# Patient Record
Sex: FEMALE | Race: WHITE | NOT HISPANIC OR LATINO | Employment: OTHER | URBAN - NONMETROPOLITAN AREA
[De-identification: names, ages, dates, MRNs, and addresses within clinical notes are randomized per-mention and may not be internally consistent; named-entity substitution may affect disease eponyms.]

---

## 2017-01-27 ENCOUNTER — TRANSCRIBE ORDERS (OUTPATIENT)
Dept: INTERNAL MEDICINE | Facility: CLINIC | Age: 57
End: 2017-01-27

## 2017-01-27 DIAGNOSIS — Z12.31 ENCOUNTER FOR SCREENING MAMMOGRAM FOR BREAST CANCER: Primary | ICD-10-CM

## 2017-02-01 ENCOUNTER — HOSPITAL ENCOUNTER (OUTPATIENT)
Dept: MAMMOGRAPHY | Facility: HOSPITAL | Age: 57
Discharge: HOME | End: 2017-02-01
Attending: FAMILY MEDICINE | Admitting: FAMILY MEDICINE

## 2017-02-01 DIAGNOSIS — Z12.31 ENCOUNTER FOR SCREENING MAMMOGRAM FOR BREAST CANCER: ICD-10-CM

## 2017-02-01 PROCEDURE — 77063 BREAST TOMOSYNTHESIS BI: CPT

## 2017-02-01 PROCEDURE — G0202 SCR MAMMO BI INCL CAD: HCPCS

## 2017-02-02 RX ORDER — HYDROCODONE BITARTRATE AND ACETAMINOPHEN 5; 325 MG/1; MG/1
1 TABLET ORAL 3 TIMES DAILY PRN
Qty: 90 TABLET | Refills: 0 | Status: SHIPPED | OUTPATIENT
Start: 2017-02-03 | End: 2017-03-01 | Stop reason: SDUPTHER

## 2017-02-03 ENCOUNTER — TRANSCRIBE ORDERS (OUTPATIENT)
Dept: ADMINISTRATIVE | Facility: HOSPITAL | Age: 57
End: 2017-02-03

## 2017-02-03 DIAGNOSIS — N63.10 BREAST MASS, RIGHT: Primary | ICD-10-CM

## 2017-02-06 ENCOUNTER — HOSPITAL ENCOUNTER (OUTPATIENT)
Dept: ULTRASOUND IMAGING | Facility: HOSPITAL | Age: 57
Discharge: HOME | End: 2017-02-06
Attending: FAMILY MEDICINE | Admitting: FAMILY MEDICINE

## 2017-02-06 DIAGNOSIS — N63.10 BREAST MASS, RIGHT: ICD-10-CM

## 2017-02-06 PROCEDURE — 76642 ULTRASOUND BREAST LIMITED: CPT

## 2017-03-02 RX ORDER — HYDROCODONE BITARTRATE AND ACETAMINOPHEN 5; 325 MG/1; MG/1
1 TABLET ORAL 3 TIMES DAILY PRN
Qty: 90 TABLET | Refills: 0 | Status: SHIPPED | OUTPATIENT
Start: 2017-03-05 | End: 2017-04-06 | Stop reason: SDUPTHER

## 2017-04-03 ENCOUNTER — LAB (OUTPATIENT)
Dept: LAB | Facility: HOSPITAL | Age: 57
End: 2017-04-03
Attending: FAMILY MEDICINE

## 2017-04-03 ENCOUNTER — TRANSCRIBE ORDERS (OUTPATIENT)
Dept: GENERAL RADIOLOGY | Facility: HOSPITAL | Age: 57
End: 2017-04-03

## 2017-04-03 DIAGNOSIS — E03.9 HYPOTHYROIDISM, UNSPECIFIED TYPE: Primary | ICD-10-CM

## 2017-04-03 DIAGNOSIS — E03.9 HYPOTHYROIDISM, UNSPECIFIED TYPE: ICD-10-CM

## 2017-04-03 LAB
ALBUMIN SERPL-MCNC: 4.1 G/DL (ref 3.5–5)
ALBUMIN/GLOB SERPL: 1.1 G/DL (ref 1.1–2.5)
ALP SERPL-CCNC: 71 U/L (ref 24–120)
ALT SERPL W P-5'-P-CCNC: 24 U/L (ref 0–54)
ANION GAP SERPL CALCULATED.3IONS-SCNC: 10 MMOL/L (ref 4–13)
AST SERPL-CCNC: 29 U/L (ref 7–45)
AUTO MIXED CELLS #: 0.3 10*3/UL (ref 0.1–2.6)
AUTO MIXED CELLS %: 5.9 % (ref 0.1–24)
BILIRUB SERPL-MCNC: 0.6 MG/DL (ref 0.1–1)
BUN BLD-MCNC: 20 MG/DL (ref 5–21)
BUN/CREAT SERPL: 23.3
CALCIUM SPEC-SCNC: 9.6 MG/DL (ref 8.4–10.4)
CHLORIDE SERPL-SCNC: 102 MMOL/L (ref 98–110)
CHOLEST SERPL-MCNC: 231 MG/DL (ref 130–200)
CO2 SERPL-SCNC: 28 MMOL/L (ref 24–31)
CREAT BLD-MCNC: 0.86 MG/DL (ref 0.5–1.4)
ERYTHROCYTE [DISTWIDTH] IN BLOOD BY AUTOMATED COUNT: 13.1 % (ref 12–15)
GFR SERPL CREATININE-BSD FRML MDRD: 69 ML/MIN/1.73
GLOBULIN UR ELPH-MCNC: 3.6 GM/DL
GLUCOSE BLD-MCNC: 86 MG/DL (ref 70–100)
HCT VFR BLD AUTO: 39.1 % (ref 37–47)
HDLC SERPL-MCNC: 77 MG/DL
HGB BLD-MCNC: 13.3 G/DL (ref 12–16)
LDLC SERPL CALC-MCNC: 113 MG/DL (ref 0–99)
LDLC/HDLC SERPL: 1.47 {RATIO}
LYMPHOCYTES # BLD AUTO: 0.8 10*3/MM3 (ref 0.8–7)
LYMPHOCYTES NFR BLD AUTO: 18.3 % (ref 15–45)
MCH RBC QN AUTO: 32.7 PG (ref 28–32)
MCHC RBC AUTO-ENTMCNC: 34 G/DL (ref 33–36)
MCV RBC AUTO: 96.1 FL (ref 82–98)
NEUTROPHILS # BLD AUTO: 3.5 10*3/MM3 (ref 1.5–8.3)
NEUTROPHILS NFR BLD AUTO: 75.8 % (ref 39–78)
PLATELET # BLD AUTO: 299 10*3/MM3 (ref 130–400)
PMV BLD AUTO: 8.7 FL (ref 6–12)
POTASSIUM BLD-SCNC: 4.8 MMOL/L (ref 3.5–5.3)
PROT SERPL-MCNC: 7.7 G/DL (ref 6.3–8.7)
RBC # BLD AUTO: 4.07 10*6/MM3 (ref 4.2–5.4)
SODIUM BLD-SCNC: 140 MMOL/L (ref 135–145)
TRIGL SERPL-MCNC: 204 MG/DL (ref 0–149)
TSH SERPL DL<=0.05 MIU/L-ACNC: 2.23 MIU/ML (ref 0.47–4.68)
VLDLC SERPL-MCNC: 40.8 MG/DL
WBC NRBC COR # BLD: 4.6 10*3/MM3 (ref 4.8–10.8)

## 2017-04-03 PROCEDURE — 85025 COMPLETE CBC W/AUTO DIFF WBC: CPT

## 2017-04-03 PROCEDURE — 36415 COLL VENOUS BLD VENIPUNCTURE: CPT

## 2017-04-03 PROCEDURE — 80061 LIPID PANEL: CPT

## 2017-04-03 PROCEDURE — 80053 COMPREHEN METABOLIC PANEL: CPT

## 2017-04-03 PROCEDURE — 84443 ASSAY THYROID STIM HORMONE: CPT | Performed by: FAMILY MEDICINE

## 2017-04-06 ENCOUNTER — HOSPITAL ENCOUNTER (OUTPATIENT)
Dept: PAIN MANAGEMENT | Age: 57
Discharge: HOME | End: 2017-04-06
Payer: COMMERCIAL

## 2017-04-06 VITALS
BODY MASS INDEX: 28.88 KG/M2 | RESPIRATION RATE: 20 BRPM | WEIGHT: 163 LBS | HEART RATE: 82 BPM | DIASTOLIC BLOOD PRESSURE: 72 MMHG | SYSTOLIC BLOOD PRESSURE: 159 MMHG | OXYGEN SATURATION: 95 % | TEMPERATURE: 97.8 F | HEIGHT: 63 IN

## 2017-04-06 DIAGNOSIS — G35 MULTIPLE SCLEROSIS (HCC): ICD-10-CM

## 2017-04-06 PROCEDURE — 99213 OFFICE O/P EST LOW 20 MIN: CPT

## 2017-04-06 RX ORDER — HYDROCODONE BITARTRATE AND ACETAMINOPHEN 5; 325 MG/1; MG/1
1 TABLET ORAL 3 TIMES DAILY PRN
Qty: 90 TABLET | Refills: 0 | Status: SHIPPED | OUTPATIENT
Start: 2017-04-06 | End: 2017-05-05 | Stop reason: SDUPTHER

## 2017-04-06 ASSESSMENT — PAIN DESCRIPTION - FREQUENCY: FREQUENCY: CONTINUOUS

## 2017-04-06 ASSESSMENT — PAIN DESCRIPTION - PAIN TYPE: TYPE: CHRONIC PAIN

## 2017-04-06 ASSESSMENT — PAIN SCALES - GENERAL: PAINLEVEL_OUTOF10: 3

## 2017-04-06 ASSESSMENT — PAIN DESCRIPTION - ORIENTATION: ORIENTATION: LOWER;RIGHT;LEFT

## 2017-04-06 ASSESSMENT — PAIN DESCRIPTION - ONSET: ONSET: ON-GOING

## 2017-04-06 ASSESSMENT — PAIN DESCRIPTION - LOCATION: LOCATION: BACK;LEG

## 2017-04-06 ASSESSMENT — PAIN DESCRIPTION - PROGRESSION: CLINICAL_PROGRESSION: NOT CHANGED

## 2017-04-06 ASSESSMENT — ACTIVITIES OF DAILY LIVING (ADL): EFFECT OF PAIN ON DAILY ACTIVITIES: DAILY ACTIVITY

## 2017-04-06 ASSESSMENT — PAIN DESCRIPTION - DESCRIPTORS: DESCRIPTORS: ACHING;CONSTANT

## 2017-05-05 RX ORDER — HYDROCODONE BITARTRATE AND ACETAMINOPHEN 5; 325 MG/1; MG/1
1 TABLET ORAL 3 TIMES DAILY PRN
Qty: 90 TABLET | Refills: 0 | Status: SHIPPED | OUTPATIENT
Start: 2017-05-06 | End: 2017-06-05 | Stop reason: SDUPTHER

## 2017-06-05 RX ORDER — HYDROCODONE BITARTRATE AND ACETAMINOPHEN 5; 325 MG/1; MG/1
1 TABLET ORAL 3 TIMES DAILY PRN
Qty: 90 TABLET | Refills: 0 | Status: SHIPPED | OUTPATIENT
Start: 2017-06-05 | End: 2017-07-12 | Stop reason: SDUPTHER

## 2017-07-07 ENCOUNTER — TELEPHONE (OUTPATIENT)
Dept: PAIN MANAGEMENT | Age: 57
End: 2017-07-07

## 2017-07-12 ENCOUNTER — HOSPITAL ENCOUNTER (OUTPATIENT)
Dept: PAIN MANAGEMENT | Age: 57
Discharge: HOME | End: 2017-07-12
Payer: COMMERCIAL

## 2017-07-12 VITALS
RESPIRATION RATE: 18 BRPM | HEIGHT: 64 IN | WEIGHT: 154 LBS | HEART RATE: 72 BPM | TEMPERATURE: 98.9 F | OXYGEN SATURATION: 99 % | SYSTOLIC BLOOD PRESSURE: 149 MMHG | DIASTOLIC BLOOD PRESSURE: 83 MMHG | BODY MASS INDEX: 26.29 KG/M2

## 2017-07-12 DIAGNOSIS — G35 MULTIPLE SCLEROSIS (HCC): ICD-10-CM

## 2017-07-12 PROCEDURE — 99213 OFFICE O/P EST LOW 20 MIN: CPT

## 2017-07-12 RX ORDER — HYDROCODONE BITARTRATE AND ACETAMINOPHEN 5; 325 MG/1; MG/1
1 TABLET ORAL 3 TIMES DAILY PRN
Qty: 90 TABLET | Refills: 0 | Status: SHIPPED | OUTPATIENT
Start: 2017-07-12 | End: 2017-08-10 | Stop reason: SDUPTHER

## 2017-07-12 ASSESSMENT — PAIN DESCRIPTION - ONSET: ONSET: ON-GOING

## 2017-07-12 ASSESSMENT — PAIN DESCRIPTION - LOCATION: LOCATION: BACK

## 2017-07-12 ASSESSMENT — PAIN DESCRIPTION - FREQUENCY: FREQUENCY: CONTINUOUS

## 2017-07-12 ASSESSMENT — ACTIVITIES OF DAILY LIVING (ADL): EFFECT OF PAIN ON DAILY ACTIVITIES: LIMITS ACTIVITY

## 2017-07-12 ASSESSMENT — PAIN DESCRIPTION - PAIN TYPE: TYPE: CHRONIC PAIN

## 2017-07-12 ASSESSMENT — PAIN DESCRIPTION - ORIENTATION: ORIENTATION: LOWER

## 2017-07-12 ASSESSMENT — PAIN SCALES - GENERAL: PAINLEVEL_OUTOF10: 6

## 2017-07-12 ASSESSMENT — PAIN DESCRIPTION - DIRECTION: RADIATING_TOWARDS: INTO BILATERAL LEGS

## 2017-07-12 ASSESSMENT — PAIN DESCRIPTION - DESCRIPTORS: DESCRIPTORS: ACHING;CONSTANT

## 2017-07-12 ASSESSMENT — PAIN DESCRIPTION - PROGRESSION: CLINICAL_PROGRESSION: NOT CHANGED

## 2017-08-11 RX ORDER — HYDROCODONE BITARTRATE AND ACETAMINOPHEN 5; 325 MG/1; MG/1
1 TABLET ORAL 3 TIMES DAILY PRN
Qty: 90 TABLET | Refills: 0 | Status: SHIPPED | OUTPATIENT
Start: 2017-08-11 | End: 2017-09-11 | Stop reason: SDUPTHER

## 2017-09-11 RX ORDER — HYDROCODONE BITARTRATE AND ACETAMINOPHEN 5; 325 MG/1; MG/1
1 TABLET ORAL 3 TIMES DAILY PRN
Qty: 90 TABLET | Refills: 0 | Status: SHIPPED | OUTPATIENT
Start: 2017-09-12 | End: 2017-10-12 | Stop reason: SDUPTHER

## 2017-10-12 ENCOUNTER — HOSPITAL ENCOUNTER (OUTPATIENT)
Dept: PAIN MANAGEMENT | Age: 57
Discharge: HOME | End: 2017-10-12
Payer: COMMERCIAL

## 2017-10-12 VITALS
HEART RATE: 72 BPM | RESPIRATION RATE: 20 BRPM | SYSTOLIC BLOOD PRESSURE: 154 MMHG | WEIGHT: 156 LBS | TEMPERATURE: 97.1 F | DIASTOLIC BLOOD PRESSURE: 84 MMHG | BODY MASS INDEX: 26.63 KG/M2 | HEIGHT: 64 IN

## 2017-10-12 DIAGNOSIS — G35 MULTIPLE SCLEROSIS (HCC): ICD-10-CM

## 2017-10-12 PROCEDURE — 99214 OFFICE O/P EST MOD 30 MIN: CPT

## 2017-10-12 PROCEDURE — 80307 DRUG TEST PRSMV CHEM ANLYZR: CPT

## 2017-10-12 ASSESSMENT — PAIN DESCRIPTION - PROGRESSION: CLINICAL_PROGRESSION: NOT CHANGED

## 2017-10-12 ASSESSMENT — PAIN DESCRIPTION - PAIN TYPE: TYPE: CHRONIC PAIN

## 2017-10-12 ASSESSMENT — PAIN DESCRIPTION - ORIENTATION: ORIENTATION: LOWER

## 2017-10-12 ASSESSMENT — PAIN DESCRIPTION - DIRECTION: RADIATING_TOWARDS: LEGS

## 2017-10-12 ASSESSMENT — PAIN DESCRIPTION - FREQUENCY: FREQUENCY: CONTINUOUS

## 2017-10-12 ASSESSMENT — PAIN DESCRIPTION - ONSET: ONSET: ON-GOING

## 2017-10-12 ASSESSMENT — PAIN DESCRIPTION - DESCRIPTORS: DESCRIPTORS: ACHING;CONSTANT

## 2017-10-12 ASSESSMENT — ACTIVITIES OF DAILY LIVING (ADL): EFFECT OF PAIN ON DAILY ACTIVITIES: LIMITS ADL'S

## 2017-10-12 ASSESSMENT — PAIN DESCRIPTION - LOCATION: LOCATION: BACK;LEG

## 2017-10-12 ASSESSMENT — PAIN SCALES - GENERAL: PAINLEVEL_OUTOF10: 3

## 2017-10-23 LAB
AMPHETAMINES, URINE: NEGATIVE NG/ML
BARBITURATES, URINE: NEGATIVE NG/ML
BENZODIAZEPINES, URINE: NEGATIVE NG/ML
CANNABINOIDS, URINE: NEGATIVE NG/ML
COCAINE METABOLITE, URINE: NEGATIVE NG/ML
CODEINE, URINE: NEGATIVE
CREATININE, URINE: 122.4 MG/DL (ref 20–300)
ETHANOL U, QUAN: NEGATIVE %
FENTANYL URINE: NEGATIVE PG/ML
HYDROCODONE, UR CONF: 829 NG/ML
HYDROCODONE, URINE: POSITIVE
HYDROMORPHONE, UR CONF: 745 NG/ML
HYDROMORPHONE, URINE: POSITIVE
MEPERIDINE, UR: NEGATIVE NG/ML
METHADONE SCREEN, URINE: NEGATIVE NG/ML
MORPHINE URINE: NEGATIVE
OPIATES, URINE: ABNORMAL NG/ML
OPIATES, URINE: POSITIVE NG/ML
OXYCODONE/OXYMORPHONE, UR: NEGATIVE NG/ML
PH, URINE: 5.4 (ref 4.5–8.9)
PHENCYCLIDINE, URINE: NEGATIVE NG/ML
PROPOXYPHENE, URINE: NEGATIVE NG/ML

## 2017-11-10 RX ORDER — HYDROCODONE BITARTRATE AND ACETAMINOPHEN 5; 325 MG/1; MG/1
1 TABLET ORAL 3 TIMES DAILY PRN
Qty: 90 TABLET | Refills: 0 | Status: SHIPPED | OUTPATIENT
Start: 2017-11-13 | End: 2017-12-12 | Stop reason: SDUPTHER

## 2017-12-13 RX ORDER — HYDROCODONE BITARTRATE AND ACETAMINOPHEN 5; 325 MG/1; MG/1
1 TABLET ORAL 3 TIMES DAILY PRN
Qty: 90 TABLET | Refills: 0 | Status: SHIPPED | OUTPATIENT
Start: 2017-12-13 | End: 2018-01-16 | Stop reason: SDUPTHER

## 2018-01-16 RX ORDER — HYDROCODONE BITARTRATE AND ACETAMINOPHEN 5; 325 MG/1; MG/1
1 TABLET ORAL 3 TIMES DAILY PRN
Qty: 90 TABLET | Refills: 0 | Status: SHIPPED | OUTPATIENT
Start: 2018-01-16 | End: 2018-02-16 | Stop reason: SDUPTHER

## 2018-01-29 ENCOUNTER — TRANSCRIBE ORDERS (OUTPATIENT)
Dept: ADMINISTRATIVE | Facility: HOSPITAL | Age: 58
End: 2018-01-29

## 2018-01-29 ENCOUNTER — DOCUMENTATION (OUTPATIENT)
Dept: RETAIL CLINIC | Facility: CLINIC | Age: 58
End: 2018-01-29

## 2018-01-29 ENCOUNTER — LAB (OUTPATIENT)
Dept: LAB | Facility: HOSPITAL | Age: 58
End: 2018-01-29

## 2018-01-29 ENCOUNTER — TELEPHONE (OUTPATIENT)
Dept: RETAIL CLINIC | Facility: CLINIC | Age: 58
End: 2018-01-29

## 2018-01-29 DIAGNOSIS — R53.83 OTHER FATIGUE: Primary | ICD-10-CM

## 2018-01-29 DIAGNOSIS — E55.9 VITAMIN D DEFICIENCY: ICD-10-CM

## 2018-01-29 DIAGNOSIS — R53.83 OTHER FATIGUE: ICD-10-CM

## 2018-01-29 LAB
ALBUMIN SERPL-MCNC: 4.7 G/DL (ref 3.5–5)
ALBUMIN/GLOB SERPL: 1.3 G/DL (ref 1.1–2.5)
ALP SERPL-CCNC: 82 U/L (ref 24–120)
ALT SERPL W P-5'-P-CCNC: 32 U/L (ref 0–54)
ANION GAP SERPL CALCULATED.3IONS-SCNC: 7 MMOL/L (ref 4–13)
AST SERPL-CCNC: 23 U/L (ref 7–45)
AUTO MIXED CELLS #: 0.8 10*3/MM3 (ref 0.1–2.6)
AUTO MIXED CELLS %: 14.1 % (ref 0.1–24)
BILIRUB SERPL-MCNC: 0.6 MG/DL (ref 0.1–1)
BILIRUB UR QL STRIP: NEGATIVE
BUN BLD-MCNC: 18 MG/DL (ref 5–21)
BUN/CREAT SERPL: 23.1
CALCIUM SPEC-SCNC: 9.9 MG/DL (ref 8.4–10.4)
CHLORIDE SERPL-SCNC: 98 MMOL/L (ref 98–110)
CLARITY UR: CLEAR
CO2 SERPL-SCNC: 33 MMOL/L (ref 24–31)
COLOR UR: YELLOW
CREAT BLD-MCNC: 0.78 MG/DL (ref 0.5–1.4)
ERYTHROCYTE [DISTWIDTH] IN BLOOD BY AUTOMATED COUNT: 12.2 % (ref 12–15)
ERYTHROCYTE [SEDIMENTATION RATE] IN BLOOD: 6 MM/HR (ref 0–20)
GFR SERPL CREATININE-BSD FRML MDRD: 77 ML/MIN/1.73
GLOBULIN UR ELPH-MCNC: 3.6 GM/DL
GLUCOSE BLD-MCNC: 90 MG/DL (ref 70–100)
GLUCOSE UR STRIP-MCNC: NEGATIVE MG/DL
HCT VFR BLD AUTO: 42 % (ref 37–47)
HGB BLD-MCNC: 14 G/DL (ref 12–16)
HGB UR QL STRIP.AUTO: NEGATIVE
KETONES UR QL STRIP: NEGATIVE
LEUKOCYTE ESTERASE UR QL STRIP.AUTO: NEGATIVE
LYMPHOCYTES # BLD AUTO: 1.2 10*3/MM3 (ref 0.8–7)
LYMPHOCYTES NFR BLD AUTO: 21.7 % (ref 15–45)
MCH RBC QN AUTO: 32 PG (ref 28–32)
MCHC RBC AUTO-ENTMCNC: 33.3 G/DL (ref 33–36)
MCV RBC AUTO: 95.9 FL (ref 82–98)
NEUTROPHILS # BLD AUTO: 3.7 10*3/MM3 (ref 1.5–8.3)
NEUTROPHILS NFR BLD AUTO: 64.2 % (ref 39–78)
NITRITE UR QL STRIP: NEGATIVE
PH UR STRIP.AUTO: 6 [PH] (ref 5–8)
PLATELET # BLD AUTO: 309 10*3/MM3 (ref 130–400)
PMV BLD AUTO: 8.5 FL (ref 6–12)
POTASSIUM BLD-SCNC: 4.4 MMOL/L (ref 3.5–5.3)
PROT SERPL-MCNC: 8.3 G/DL (ref 6.3–8.7)
PROT UR QL STRIP: NEGATIVE
RBC # BLD AUTO: 4.38 10*6/MM3 (ref 4.2–5.4)
SODIUM BLD-SCNC: 138 MMOL/L (ref 135–145)
SP GR UR STRIP: 1.02 (ref 1–1.03)
UROBILINOGEN UR QL STRIP: NORMAL
WBC NRBC COR # BLD: 5.7 10*3/MM3 (ref 4.8–10.8)

## 2018-01-29 PROCEDURE — 82306 VITAMIN D 25 HYDROXY: CPT | Performed by: NURSE PRACTITIONER

## 2018-01-29 PROCEDURE — 85652 RBC SED RATE AUTOMATED: CPT

## 2018-01-29 PROCEDURE — 81003 URINALYSIS AUTO W/O SCOPE: CPT

## 2018-01-29 PROCEDURE — 36415 COLL VENOUS BLD VENIPUNCTURE: CPT

## 2018-01-29 PROCEDURE — 85025 COMPLETE CBC W/AUTO DIFF WBC: CPT | Performed by: NURSE PRACTITIONER

## 2018-01-29 PROCEDURE — 80053 COMPREHEN METABOLIC PANEL: CPT | Performed by: NURSE PRACTITIONER

## 2018-01-29 NOTE — PROGRESS NOTES
Patient presented with complaints of dizziness which started yesterday. She also had an episode this morning when she felt like she couldn't get her words out.  She reports having a similar episode last month which lasted four days.  She has MS and is followed by Dr. Jade in [place]. She sees Dr. Madrigal, but he is not available today. I have recommended she go to a higher level of care. We discussed going to ER and my concern about her driving. I have offered to call an ambulance. She states she drove from [place] and feels safe to drive. She would like to avoid going to the ER and states she will go directly to [facility] which is very close by. She has given me permission to call and check on her later.

## 2018-01-29 NOTE — PROGRESS NOTES
I called to check on patient to make sure she arrived at [facility] okay. She states she went by there, but they were really busy so she went to her Daughter's house which is nearby instead.  She has had dizziness off and on since yesterday. She has some ringing of her ears yesterday which has resolved. She had some problems getting words out this morning, but that also resolved. She has had some problems with gait.  I have advised her to have her daughter drive her to ER now.  We have discussed that her symptoms could be MS related or something more serious which needs immediate attention. She is reluctant to go to ER stating she'll consider it.  I strongly encouraged her to have her daughter take her to ER now. She verbalized understanding.

## 2018-01-29 NOTE — TELEPHONE ENCOUNTER
I called and spoke with Mrs. Medley. She went to [facility] today and after evaluation was told she is likely having a MS flare. She now has an appointment to see her MS specialist Dr. Jade in [place] this Wednesday 1/24/2018.

## 2018-01-30 LAB — 25(OH)D3 SERPL-MCNC: 37.8 NG/ML (ref 30–100)

## 2018-02-06 ENCOUNTER — HOSPITAL ENCOUNTER (EMERGENCY)
Facility: HOSPITAL | Age: 58
Discharge: HOME | End: 2018-02-06
Attending: EMERGENCY MEDICINE | Admitting: EMERGENCY MEDICINE

## 2018-02-06 ENCOUNTER — APPOINTMENT (OUTPATIENT)
Dept: GENERAL RADIOLOGY | Facility: HOSPITAL | Age: 58
End: 2018-02-06

## 2018-02-06 VITALS
TEMPERATURE: 97.8 F | HEART RATE: 87 BPM | BODY MASS INDEX: 25.95 KG/M2 | SYSTOLIC BLOOD PRESSURE: 178 MMHG | WEIGHT: 152 LBS | DIASTOLIC BLOOD PRESSURE: 94 MMHG | OXYGEN SATURATION: 99 % | HEIGHT: 64 IN | RESPIRATION RATE: 19 BRPM

## 2018-02-06 DIAGNOSIS — R07.89 CHEST WALL PAIN: Primary | ICD-10-CM

## 2018-02-06 LAB
ALBUMIN SERPL-MCNC: 4.3 G/DL (ref 3.5–5)
ALBUMIN/GLOB SERPL: 1.5 G/DL (ref 1.1–2.5)
ALP SERPL-CCNC: 66 U/L (ref 24–120)
ALT SERPL W P-5'-P-CCNC: 26 U/L (ref 0–54)
ANION GAP SERPL CALCULATED.3IONS-SCNC: 10 MMOL/L (ref 4–13)
AST SERPL-CCNC: 30 U/L (ref 7–45)
BASOPHILS # BLD AUTO: 0.03 10*3/MM3 (ref 0–0.2)
BASOPHILS NFR BLD AUTO: 0.4 % (ref 0–2)
BILIRUB SERPL-MCNC: 0.5 MG/DL (ref 0.1–1)
BUN BLD-MCNC: 18 MG/DL (ref 5–21)
BUN/CREAT SERPL: 25.7 (ref 7–25)
CALCIUM SPEC-SCNC: 9.3 MG/DL (ref 8.4–10.4)
CHLORIDE SERPL-SCNC: 100 MMOL/L (ref 98–110)
CO2 SERPL-SCNC: 30 MMOL/L (ref 24–31)
CREAT BLD-MCNC: 0.7 MG/DL (ref 0.5–1.4)
DEPRECATED RDW RBC AUTO: 42.4 FL (ref 40–54)
EOSINOPHIL # BLD AUTO: 0.03 10*3/MM3 (ref 0–0.7)
EOSINOPHIL NFR BLD AUTO: 0.4 % (ref 0–4)
ERYTHROCYTE [DISTWIDTH] IN BLOOD BY AUTOMATED COUNT: 12.2 % (ref 12–15)
GFR SERPL CREATININE-BSD FRML MDRD: 87 ML/MIN/1.73
GLOBULIN UR ELPH-MCNC: 2.8 GM/DL
GLUCOSE BLD-MCNC: 140 MG/DL (ref 70–100)
HCT VFR BLD AUTO: 37.6 % (ref 37–47)
HGB BLD-MCNC: 12.7 G/DL (ref 12–16)
IMM GRANULOCYTES # BLD: 0.03 10*3/MM3 (ref 0–0.03)
IMM GRANULOCYTES NFR BLD: 0.4 % (ref 0–5)
LYMPHOCYTES # BLD AUTO: 0.64 10*3/MM3 (ref 0.72–4.86)
LYMPHOCYTES NFR BLD AUTO: 7.6 % (ref 15–45)
MCH RBC QN AUTO: 31.8 PG (ref 28–32)
MCHC RBC AUTO-ENTMCNC: 33.8 G/DL (ref 33–36)
MCV RBC AUTO: 94 FL (ref 82–98)
MONOCYTES # BLD AUTO: 0.33 10*3/MM3 (ref 0.19–1.3)
MONOCYTES NFR BLD AUTO: 3.9 % (ref 4–12)
NEUTROPHILS # BLD AUTO: 7.39 10*3/MM3 (ref 1.87–8.4)
NEUTROPHILS NFR BLD AUTO: 87.3 % (ref 39–78)
NRBC BLD MANUAL-RTO: 0 /100 WBC (ref 0–0)
PLATELET # BLD AUTO: 326 10*3/MM3 (ref 130–400)
PMV BLD AUTO: 9.3 FL (ref 6–12)
POTASSIUM BLD-SCNC: 4.5 MMOL/L (ref 3.5–5.3)
PROT SERPL-MCNC: 7.1 G/DL (ref 6.3–8.7)
RBC # BLD AUTO: 4 10*6/MM3 (ref 4.2–5.4)
SODIUM BLD-SCNC: 140 MMOL/L (ref 135–145)
TROPONIN I SERPL-MCNC: <0.012 NG/ML (ref 0–0.03)
WBC NRBC COR # BLD: 8.45 10*3/MM3 (ref 4.8–10.8)

## 2018-02-06 PROCEDURE — 96365 THER/PROPH/DIAG IV INF INIT: CPT

## 2018-02-06 PROCEDURE — 71045 X-RAY EXAM CHEST 1 VIEW: CPT

## 2018-02-06 PROCEDURE — 84484 ASSAY OF TROPONIN QUANT: CPT | Performed by: EMERGENCY MEDICINE

## 2018-02-06 PROCEDURE — 80053 COMPREHEN METABOLIC PANEL: CPT | Performed by: EMERGENCY MEDICINE

## 2018-02-06 PROCEDURE — 99283 EMERGENCY DEPT VISIT LOW MDM: CPT

## 2018-02-06 PROCEDURE — 85025 COMPLETE CBC W/AUTO DIFF WBC: CPT | Performed by: EMERGENCY MEDICINE

## 2018-02-06 PROCEDURE — 96375 TX/PRO/DX INJ NEW DRUG ADDON: CPT

## 2018-02-06 PROCEDURE — 93010 ELECTROCARDIOGRAM REPORT: CPT | Performed by: INTERNAL MEDICINE

## 2018-02-06 PROCEDURE — 25010000002 HYDROMORPHONE PER 4 MG: Performed by: EMERGENCY MEDICINE

## 2018-02-06 PROCEDURE — 25010000002 ONDANSETRON PER 1 MG: Performed by: EMERGENCY MEDICINE

## 2018-02-06 PROCEDURE — 25010000003 METHYLPREDNISOLONE PER 125 MG: Performed by: EMERGENCY MEDICINE

## 2018-02-06 PROCEDURE — 93005 ELECTROCARDIOGRAM TRACING: CPT | Performed by: EMERGENCY MEDICINE

## 2018-02-06 RX ORDER — SODIUM CHLORIDE 0.9 % (FLUSH) 0.9 %
10 SYRINGE (ML) INJECTION AS NEEDED
Status: DISCONTINUED | OUTPATIENT
Start: 2018-02-06 | End: 2018-02-06 | Stop reason: HOSPADM

## 2018-02-06 RX ORDER — ONDANSETRON 2 MG/ML
4 INJECTION INTRAMUSCULAR; INTRAVENOUS ONCE
Status: COMPLETED | OUTPATIENT
Start: 2018-02-06 | End: 2018-02-06

## 2018-02-06 RX ADMIN — METHYLPREDNISOLONE SODIUM SUCCINATE 1000 MG: 1 INJECTION, POWDER, LYOPHILIZED, FOR SOLUTION INTRAMUSCULAR; INTRAVENOUS at 14:59

## 2018-02-06 RX ADMIN — ONDANSETRON 4 MG: 2 INJECTION, SOLUTION INTRAMUSCULAR; INTRAVENOUS at 16:15

## 2018-02-06 RX ADMIN — HYDROMORPHONE HYDROCHLORIDE 1 MG: 1 INJECTION, SOLUTION INTRAMUSCULAR; INTRAVENOUS; SUBCUTANEOUS at 16:15

## 2018-02-08 NOTE — ED NOTES
"ED Call Back Questions    1. How are you doing since leaving the Emergency Department?    Not doing  better  2. Do you have any questions about your discharge instructions? No     3. Have you filled your new prescriptions yet? N/A  a. Do you have any questions about those medications? N/A    4. Were you able to make a follow-up appointment with the physician? No     5. Do you have a primary care physician? Yes   a. If No, would you like for me to set you up with one? N/A  i. If Yes, “I will have our ED  give you a call right back at this number to work with you on the best time for an appointment.”    6. We are always looking to get better at what we do. Do you have any suggestions for what we can do to be even better? N/A  a. If Yes, \"Thank you for sharing your concerns. I apologize. I will follow up with our manager and patient . Would you like someone to call you back?\" N/A    7. Is there anything else I can do for you? No   Visit was fine     Beltran King  02/08/18 9221    "

## 2018-02-12 ENCOUNTER — HOSPITAL ENCOUNTER (OUTPATIENT)
Facility: HOSPITAL | Age: 58
Setting detail: OBSERVATION
Discharge: HOME | End: 2018-02-14
Attending: EMERGENCY MEDICINE | Admitting: INTERNAL MEDICINE

## 2018-02-12 ENCOUNTER — APPOINTMENT (OUTPATIENT)
Dept: GENERAL RADIOLOGY | Facility: HOSPITAL | Age: 58
End: 2018-02-12

## 2018-02-12 ENCOUNTER — APPOINTMENT (OUTPATIENT)
Dept: CT IMAGING | Facility: HOSPITAL | Age: 58
End: 2018-02-12

## 2018-02-12 DIAGNOSIS — R07.9 CHEST PAIN, UNSPECIFIED TYPE: Primary | ICD-10-CM

## 2018-02-12 LAB
ALBUMIN SERPL-MCNC: 4.1 G/DL (ref 3.5–5)
ALBUMIN/GLOB SERPL: 1.5 G/DL (ref 1.1–2.5)
ALP SERPL-CCNC: 65 U/L (ref 24–120)
ALT SERPL W P-5'-P-CCNC: 40 U/L (ref 0–54)
AMYLASE SERPL-CCNC: 65 U/L (ref 30–110)
ANION GAP SERPL CALCULATED.3IONS-SCNC: 12 MMOL/L (ref 4–13)
AST SERPL-CCNC: 41 U/L (ref 7–45)
BASOPHILS # BLD AUTO: 0.01 10*3/MM3 (ref 0–0.2)
BASOPHILS NFR BLD AUTO: 0.2 % (ref 0–2)
BILIRUB SERPL-MCNC: 0.3 MG/DL (ref 0.1–1)
BUN BLD-MCNC: 16 MG/DL (ref 5–21)
BUN/CREAT SERPL: 19.5 (ref 7–25)
CALCIUM SPEC-SCNC: 9.4 MG/DL (ref 8.4–10.4)
CHLORIDE SERPL-SCNC: 98 MMOL/L (ref 98–110)
CO2 SERPL-SCNC: 30 MMOL/L (ref 24–31)
CREAT BLD-MCNC: 0.82 MG/DL (ref 0.5–1.4)
D DIMER PPP FEU-MCNC: 0.71 MG/L (FEU) (ref 0–0.5)
DEPRECATED RDW RBC AUTO: 43.7 FL (ref 40–54)
EOSINOPHIL # BLD AUTO: 0.35 10*3/MM3 (ref 0–0.7)
EOSINOPHIL NFR BLD AUTO: 5.5 % (ref 0–4)
ERYTHROCYTE [DISTWIDTH] IN BLOOD BY AUTOMATED COUNT: 12.5 % (ref 12–15)
GFR SERPL CREATININE-BSD FRML MDRD: 72 ML/MIN/1.73
GLOBULIN UR ELPH-MCNC: 2.7 GM/DL
GLUCOSE BLD-MCNC: 92 MG/DL (ref 70–100)
GLUCOSE BLDC GLUCOMTR-MCNC: 82 MG/DL (ref 70–130)
HCT VFR BLD AUTO: 38.5 % (ref 37–47)
HGB BLD-MCNC: 12.8 G/DL (ref 12–16)
HOLD SPECIMEN: NORMAL
HOLD SPECIMEN: NORMAL
IMM GRANULOCYTES # BLD: 0.03 10*3/MM3 (ref 0–0.03)
IMM GRANULOCYTES NFR BLD: 0.5 % (ref 0–5)
LIPASE SERPL-CCNC: 106 U/L (ref 23–203)
LYMPHOCYTES # BLD AUTO: 1.42 10*3/MM3 (ref 0.72–4.86)
LYMPHOCYTES NFR BLD AUTO: 22.3 % (ref 15–45)
MCH RBC QN AUTO: 31.3 PG (ref 28–32)
MCHC RBC AUTO-ENTMCNC: 33.2 G/DL (ref 33–36)
MCV RBC AUTO: 94.1 FL (ref 82–98)
MONOCYTES # BLD AUTO: 0.69 10*3/MM3 (ref 0.19–1.3)
MONOCYTES NFR BLD AUTO: 10.8 % (ref 4–12)
NEUTROPHILS # BLD AUTO: 3.88 10*3/MM3 (ref 1.87–8.4)
NEUTROPHILS NFR BLD AUTO: 60.7 % (ref 39–78)
NRBC BLD MANUAL-RTO: 0 /100 WBC (ref 0–0)
PLATELET # BLD AUTO: 325 10*3/MM3 (ref 130–400)
PMV BLD AUTO: 9.2 FL (ref 6–12)
POTASSIUM BLD-SCNC: 4.4 MMOL/L (ref 3.5–5.3)
PROT SERPL-MCNC: 6.8 G/DL (ref 6.3–8.7)
RBC # BLD AUTO: 4.09 10*6/MM3 (ref 4.2–5.4)
SODIUM BLD-SCNC: 140 MMOL/L (ref 135–145)
TROPONIN I SERPL-MCNC: <0.012 NG/ML (ref 0–0.03)
TROPONIN I SERPL-MCNC: <0.012 NG/ML (ref 0–0.03)
WBC NRBC COR # BLD: 6.38 10*3/MM3 (ref 4.8–10.8)
WHOLE BLOOD HOLD SPECIMEN: NORMAL
WHOLE BLOOD HOLD SPECIMEN: NORMAL

## 2018-02-12 PROCEDURE — G0378 HOSPITAL OBSERVATION PER HR: HCPCS

## 2018-02-12 PROCEDURE — 96376 TX/PRO/DX INJ SAME DRUG ADON: CPT

## 2018-02-12 PROCEDURE — 80053 COMPREHEN METABOLIC PANEL: CPT | Performed by: EMERGENCY MEDICINE

## 2018-02-12 PROCEDURE — 82150 ASSAY OF AMYLASE: CPT | Performed by: EMERGENCY MEDICINE

## 2018-02-12 PROCEDURE — 25010000002 HYDROMORPHONE PER 4 MG: Performed by: FAMILY MEDICINE

## 2018-02-12 PROCEDURE — 83690 ASSAY OF LIPASE: CPT | Performed by: EMERGENCY MEDICINE

## 2018-02-12 PROCEDURE — 85379 FIBRIN DEGRADATION QUANT: CPT | Performed by: EMERGENCY MEDICINE

## 2018-02-12 PROCEDURE — 0 IOPAMIDOL PER 1 ML: Performed by: EMERGENCY MEDICINE

## 2018-02-12 PROCEDURE — 25010000002 ENOXAPARIN PER 10 MG: Performed by: FAMILY MEDICINE

## 2018-02-12 PROCEDURE — 71275 CT ANGIOGRAPHY CHEST: CPT

## 2018-02-12 PROCEDURE — 93005 ELECTROCARDIOGRAM TRACING: CPT

## 2018-02-12 PROCEDURE — 99284 EMERGENCY DEPT VISIT MOD MDM: CPT

## 2018-02-12 PROCEDURE — 96374 THER/PROPH/DIAG INJ IV PUSH: CPT

## 2018-02-12 PROCEDURE — 25010000002 PROMETHAZINE PER 50 MG: Performed by: FAMILY MEDICINE

## 2018-02-12 PROCEDURE — 93010 ELECTROCARDIOGRAM REPORT: CPT | Performed by: INTERNAL MEDICINE

## 2018-02-12 PROCEDURE — 70450 CT HEAD/BRAIN W/O DYE: CPT

## 2018-02-12 PROCEDURE — 84484 ASSAY OF TROPONIN QUANT: CPT | Performed by: EMERGENCY MEDICINE

## 2018-02-12 PROCEDURE — 25010000002 HYDROMORPHONE PER 4 MG: Performed by: EMERGENCY MEDICINE

## 2018-02-12 PROCEDURE — 93005 ELECTROCARDIOGRAM TRACING: CPT | Performed by: EMERGENCY MEDICINE

## 2018-02-12 PROCEDURE — 85025 COMPLETE CBC W/AUTO DIFF WBC: CPT | Performed by: EMERGENCY MEDICINE

## 2018-02-12 PROCEDURE — 82962 GLUCOSE BLOOD TEST: CPT

## 2018-02-12 PROCEDURE — 25010000002 PROMETHAZINE PER 50 MG: Performed by: EMERGENCY MEDICINE

## 2018-02-12 PROCEDURE — 96375 TX/PRO/DX INJ NEW DRUG ADDON: CPT

## 2018-02-12 PROCEDURE — 96372 THER/PROPH/DIAG INJ SC/IM: CPT

## 2018-02-12 PROCEDURE — 71045 X-RAY EXAM CHEST 1 VIEW: CPT

## 2018-02-12 RX ORDER — SODIUM CHLORIDE 9 MG/ML
100 INJECTION, SOLUTION INTRAVENOUS CONTINUOUS
Status: DISCONTINUED | OUTPATIENT
Start: 2018-02-12 | End: 2018-02-14

## 2018-02-12 RX ORDER — DEXTROSE MONOHYDRATE 25 G/50ML
25 INJECTION, SOLUTION INTRAVENOUS
Status: DISCONTINUED | OUTPATIENT
Start: 2018-02-12 | End: 2018-02-14 | Stop reason: HOSPADM

## 2018-02-12 RX ORDER — NALOXONE HCL 0.4 MG/ML
0.4 VIAL (ML) INJECTION
Status: DISCONTINUED | OUTPATIENT
Start: 2018-02-12 | End: 2018-02-14 | Stop reason: HOSPADM

## 2018-02-12 RX ORDER — FINGOLIMOD HYDROCHLORIDE 0.5 MG/1
0.5 CAPSULE ORAL DAILY
COMMUNITY

## 2018-02-12 RX ORDER — ASPIRIN 325 MG
325 TABLET, DELAYED RELEASE (ENTERIC COATED) ORAL DAILY
Status: DISCONTINUED | OUTPATIENT
Start: 2018-02-13 | End: 2018-02-14 | Stop reason: HOSPADM

## 2018-02-12 RX ORDER — ACETAMINOPHEN 325 MG/1
325 TABLET ORAL EVERY 8 HOURS PRN
Status: DISCONTINUED | OUTPATIENT
Start: 2018-02-12 | End: 2018-02-14 | Stop reason: HOSPADM

## 2018-02-12 RX ORDER — GABAPENTIN 800 MG/1
900 TABLET ORAL 3 TIMES DAILY
COMMUNITY

## 2018-02-12 RX ORDER — SODIUM CHLORIDE 0.9 % (FLUSH) 0.9 %
10 SYRINGE (ML) INJECTION AS NEEDED
Status: DISCONTINUED | OUTPATIENT
Start: 2018-02-12 | End: 2018-02-14 | Stop reason: HOSPADM

## 2018-02-12 RX ORDER — ONDANSETRON 2 MG/ML
4 INJECTION INTRAMUSCULAR; INTRAVENOUS ONCE
Status: DISCONTINUED | OUTPATIENT
Start: 2018-02-12 | End: 2018-02-12

## 2018-02-12 RX ORDER — HYDROMORPHONE HCL 110MG/55ML
0.5 PATIENT CONTROLLED ANALGESIA SYRINGE INTRAVENOUS
Status: DISCONTINUED | OUTPATIENT
Start: 2018-02-12 | End: 2018-02-14

## 2018-02-12 RX ORDER — ATORVASTATIN CALCIUM 40 MG/1
40 TABLET, FILM COATED ORAL NIGHTLY
Status: DISCONTINUED | OUTPATIENT
Start: 2018-02-12 | End: 2018-02-14 | Stop reason: HOSPADM

## 2018-02-12 RX ORDER — TRAZODONE HYDROCHLORIDE 150 MG/1
300 TABLET ORAL NIGHTLY
COMMUNITY

## 2018-02-12 RX ORDER — ASPIRIN 81 MG/1
324 TABLET, CHEWABLE ORAL ONCE
Status: COMPLETED | OUTPATIENT
Start: 2018-02-12 | End: 2018-02-12

## 2018-02-12 RX ORDER — FAMOTIDINE 20 MG/1
40 TABLET, FILM COATED ORAL DAILY
Status: DISCONTINUED | OUTPATIENT
Start: 2018-02-13 | End: 2018-02-13

## 2018-02-12 RX ORDER — PROMETHAZINE HYDROCHLORIDE 25 MG/ML
12.5 INJECTION, SOLUTION INTRAMUSCULAR; INTRAVENOUS EVERY 6 HOURS PRN
Status: DISCONTINUED | OUTPATIENT
Start: 2018-02-12 | End: 2018-02-14

## 2018-02-12 RX ORDER — SODIUM CHLORIDE 0.9 % (FLUSH) 0.9 %
1-10 SYRINGE (ML) INJECTION AS NEEDED
Status: DISCONTINUED | OUTPATIENT
Start: 2018-02-12 | End: 2018-02-14 | Stop reason: HOSPADM

## 2018-02-12 RX ORDER — ACETAMINOPHEN 325 MG/1
300 TABLET ORAL 3 TIMES DAILY
COMMUNITY

## 2018-02-12 RX ORDER — PROMETHAZINE HYDROCHLORIDE 25 MG/ML
12.5 INJECTION, SOLUTION INTRAMUSCULAR; INTRAVENOUS ONCE
Status: COMPLETED | OUTPATIENT
Start: 2018-02-12 | End: 2018-02-12

## 2018-02-12 RX ORDER — PROMETHAZINE HYDROCHLORIDE 12.5 MG/1
12.5 SUPPOSITORY RECTAL EVERY 6 HOURS PRN
Status: DISCONTINUED | OUTPATIENT
Start: 2018-02-12 | End: 2018-02-14 | Stop reason: HOSPADM

## 2018-02-12 RX ORDER — ESTRADIOL 2 MG/1
2 TABLET ORAL DAILY
Status: DISCONTINUED | OUTPATIENT
Start: 2018-02-13 | End: 2018-02-14 | Stop reason: HOSPADM

## 2018-02-12 RX ORDER — TRAZODONE HYDROCHLORIDE 150 MG/1
300 TABLET ORAL NIGHTLY
Status: DISCONTINUED | OUTPATIENT
Start: 2018-02-12 | End: 2018-02-14 | Stop reason: HOSPADM

## 2018-02-12 RX ORDER — HYDROCODONE BITARTRATE AND ACETAMINOPHEN 5; 325 MG/1; MG/1
1 TABLET ORAL 3 TIMES DAILY
COMMUNITY

## 2018-02-12 RX ORDER — ESTRADIOL 2 MG/1
2 TABLET ORAL DAILY
COMMUNITY

## 2018-02-12 RX ORDER — NICOTINE POLACRILEX 4 MG
15 LOZENGE BUCCAL
Status: DISCONTINUED | OUTPATIENT
Start: 2018-02-12 | End: 2018-02-14 | Stop reason: HOSPADM

## 2018-02-12 RX ORDER — PROMETHAZINE HYDROCHLORIDE 25 MG/1
12.5 TABLET ORAL EVERY 6 HOURS PRN
Status: DISCONTINUED | OUTPATIENT
Start: 2018-02-12 | End: 2018-02-14 | Stop reason: HOSPADM

## 2018-02-12 RX ORDER — GABAPENTIN 400 MG/1
800 CAPSULE ORAL EVERY 8 HOURS SCHEDULED
Status: DISCONTINUED | OUTPATIENT
Start: 2018-02-12 | End: 2018-02-14 | Stop reason: HOSPADM

## 2018-02-12 RX ADMIN — IOPAMIDOL 100 ML: 755 INJECTION, SOLUTION INTRAVENOUS at 17:41

## 2018-02-12 RX ADMIN — HYDROMORPHONE HYDROCHLORIDE 0.5 MG: 2 INJECTION, SOLUTION INTRAMUSCULAR; INTRAVENOUS; SUBCUTANEOUS at 22:38

## 2018-02-12 RX ADMIN — DESMOPRESSIN ACETATE 40 MG: 0.2 TABLET ORAL at 22:38

## 2018-02-12 RX ADMIN — ASPIRIN 81 MG 324 MG: 81 TABLET ORAL at 18:56

## 2018-02-12 RX ADMIN — GABAPENTIN 800 MG: 400 CAPSULE ORAL at 22:38

## 2018-02-12 RX ADMIN — HYDROMORPHONE HYDROCHLORIDE 1 MG: 1 INJECTION, SOLUTION INTRAMUSCULAR; INTRAVENOUS; SUBCUTANEOUS at 18:49

## 2018-02-12 RX ADMIN — HYDROMORPHONE HYDROCHLORIDE 1 MG: 1 INJECTION, SOLUTION INTRAMUSCULAR; INTRAVENOUS; SUBCUTANEOUS at 17:10

## 2018-02-12 RX ADMIN — TRAZODONE HYDROCHLORIDE 300 MG: 150 TABLET ORAL at 22:38

## 2018-02-12 RX ADMIN — PROMETHAZINE HYDROCHLORIDE 12.5 MG: 25 INJECTION INTRAMUSCULAR; INTRAVENOUS at 18:49

## 2018-02-12 RX ADMIN — PROMETHAZINE HYDROCHLORIDE 12.5 MG: 25 INJECTION INTRAMUSCULAR; INTRAVENOUS at 17:18

## 2018-02-12 RX ADMIN — ENOXAPARIN SODIUM 40 MG: 40 INJECTION SUBCUTANEOUS at 22:39

## 2018-02-12 RX ADMIN — PROMETHAZINE HYDROCHLORIDE 12.5 MG: 25 INJECTION INTRAMUSCULAR; INTRAVENOUS at 22:38

## 2018-02-12 NOTE — ED PROVIDER NOTES
Subjective   Patient is a 57 y.o. female presenting with chest pain.   Chest Pain   Pain location:  Substernal area  Pain quality: aching and burning    Pain quality: not sharp and not tearing    Pain radiates to:  Does not radiate  Pain severity:  Moderate  Onset quality:  Gradual  Timing:  Constant  Progression:  Worsening  Chronicity:  New  Context: not breathing, not drug use, not eating, not lifting, not movement, not raising an arm and not at rest    Relieved by:  Nothing  Worsened by:  Nothing  Ineffective treatments:  None tried  Associated symptoms: no abdominal pain, no AICD problem, no altered mental status, no anorexia, no anxiety, no back pain, no diaphoresis, no dizziness, no dysphagia, no fatigue, no fever, no headache, no heartburn, no nausea, no numbness, no orthopnea, no palpitations, no PND, no shortness of breath, no vomiting and no weakness        Review of Systems   Constitutional: Negative.  Negative for activity change, appetite change, chills, diaphoresis, fatigue and fever.   HENT: Negative for congestion, drooling, ear pain, facial swelling, hearing loss, sinus pressure, sore throat and trouble swallowing.    Eyes: Negative.  Negative for discharge.   Respiratory: Negative for shortness of breath.    Cardiovascular: Positive for chest pain. Negative for palpitations, orthopnea and PND.   Gastrointestinal: Negative for abdominal distention, abdominal pain, anorexia, blood in stool, diarrhea, heartburn, nausea and vomiting.   Endocrine: Negative.  Negative for cold intolerance, heat intolerance, polydipsia, polyphagia and polyuria.   Genitourinary: Negative.  Negative for dysuria, flank pain and urgency.   Musculoskeletal: Negative.  Negative for arthralgias, back pain, myalgias and neck stiffness.   Skin: Negative.  Negative for color change, pallor and rash.   Allergic/Immunologic: Negative.    Neurological: Negative.  Negative for dizziness, seizures, speech difficulty, weakness, numbness  and headaches.   Hematological: Negative.  Negative for adenopathy.   All other systems reviewed and are negative.      Past Medical History:   Diagnosis Date   • MS (multiple sclerosis)        Allergies   Allergen Reactions   • Zofran [Ondansetron Hcl] Itching   • Ciprofloxacin Rash   • Penicillins Rash   • Sulfa Antibiotics Rash   • Thorazine [Chlorpromazine] Anxiety       Past Surgical History:   Procedure Laterality Date   • HYSTERECTOMY         Family History   Problem Relation Age of Onset   • Breast cancer Maternal Aunt        Social History     Social History   • Marital status:      Spouse name: N/A   • Number of children: N/A   • Years of education: N/A     Social History Main Topics   • Smoking status: Never Smoker   • Smokeless tobacco: None   • Alcohol use No   • Drug use: No   • Sexual activity: Defer     Other Topics Concern   • None     Social History Narrative           Objective   Physical Exam   Constitutional: She is oriented to person, place, and time. She appears well-developed and well-nourished.   HENT:   Head: Normocephalic.   Right Ear: External ear normal.   Eyes: Conjunctivae are normal. Pupils are equal, round, and reactive to light.   Neck: Normal range of motion. Neck supple.   Cardiovascular: Normal rate, regular rhythm, normal heart sounds and intact distal pulses.  PMI is not displaced.  Exam reveals no decreased pulses.    No murmur heard.  Pulmonary/Chest: Effort normal and breath sounds normal. No accessory muscle usage. No tachypnea. No respiratory distress. She has no decreased breath sounds. She has no wheezes. She has no rales. She exhibits no tenderness.   Abdominal: Soft. Bowel sounds are normal. There is no tenderness.   Musculoskeletal: Normal range of motion. She exhibits no edema or tenderness.   Lower extremity exam bilaterally is unremarkable.  There is no right or left calf tenderness .  There is no palpable venous cord.  No obvious difference in the size of  the legs.  No pitting edema.  The dorsalis pedis and posterior tibial femoral and popliteal pulses are palpable and +2 bilaterally.  Homans sign is negative       Vascular Status -  Her exam exhibits right foot vasculature normal. Her exam exhibits no right foot edema. Her exam exhibits left foot vasculature normal. Her exam exhibits no left foot edema.  Neurological: She is alert and oriented to person, place, and time. She has normal reflexes. No cranial nerve deficit. Coordination normal.   Skin: Skin is warm. No rash noted. No erythema.   Nursing note and vitals reviewed.      Procedures         ED Course  ED Course   Comment By Time   Ct scan report discussed Chandler Eng MD 02/12 1849   Patient is having pain in her chest.  Negative rule out and for pain meds Chandler Eng MD 02/12 1849                  [facility]    Final diagnoses:   Chest pain, unspecified type            Chandler Eng MD  02/12/18 0370

## 2018-02-13 ENCOUNTER — APPOINTMENT (OUTPATIENT)
Dept: CARDIOLOGY | Facility: HOSPITAL | Age: 58
End: 2018-02-13
Attending: FAMILY MEDICINE

## 2018-02-13 ENCOUNTER — APPOINTMENT (OUTPATIENT)
Dept: CARDIOLOGY | Facility: HOSPITAL | Age: 58
End: 2018-02-13

## 2018-02-13 ENCOUNTER — APPOINTMENT (OUTPATIENT)
Dept: GENERAL RADIOLOGY | Facility: HOSPITAL | Age: 58
End: 2018-02-13

## 2018-02-13 LAB
ALBUMIN SERPL-MCNC: 3.6 G/DL (ref 3.5–5)
ALBUMIN/GLOB SERPL: 1.3 G/DL (ref 1.1–2.5)
ALP SERPL-CCNC: 60 U/L (ref 24–120)
ALT SERPL W P-5'-P-CCNC: 42 U/L (ref 0–54)
ANION GAP SERPL CALCULATED.3IONS-SCNC: 9 MMOL/L (ref 4–13)
AST SERPL-CCNC: 50 U/L (ref 7–45)
BASOPHILS # BLD AUTO: 0.02 10*3/MM3 (ref 0–0.2)
BASOPHILS NFR BLD AUTO: 0.4 % (ref 0–2)
BH CV ECHO MEAS - AO MAX PG (FULL): 4.4 MMHG
BH CV ECHO MEAS - AO MAX PG: 8 MMHG
BH CV ECHO MEAS - AO MEAN PG (FULL): 4 MMHG
BH CV ECHO MEAS - AO MEAN PG: 6 MMHG
BH CV ECHO MEAS - AO ROOT AREA (BSA CORRECTED): 1.6
BH CV ECHO MEAS - AO ROOT AREA: 6.2 CM^2
BH CV ECHO MEAS - AO ROOT DIAM: 2.8 CM
BH CV ECHO MEAS - AO V2 MAX: 141 CM/SEC
BH CV ECHO MEAS - AO V2 MEAN: 114 CM/SEC
BH CV ECHO MEAS - AO V2 VTI: 37.3 CM
BH CV ECHO MEAS - AVA(I,A): 1.9 CM^2
BH CV ECHO MEAS - AVA(I,D): 1.9 CM^2
BH CV ECHO MEAS - AVA(V,A): 2.1 CM^2
BH CV ECHO MEAS - AVA(V,D): 2.1 CM^2
BH CV ECHO MEAS - BSA(HAYCOCK): 1.8 M^2
BH CV ECHO MEAS - BSA: 1.7 M^2
BH CV ECHO MEAS - BZI_BMI: 26.1 KILOGRAMS/M^2
BH CV ECHO MEAS - BZI_METRIC_HEIGHT: 162.6 CM
BH CV ECHO MEAS - BZI_METRIC_WEIGHT: 68.9 KG
BH CV ECHO MEAS - CONTRAST EF 4CH: 58.4 ML/M^2
BH CV ECHO MEAS - EDV(CUBED): 54 ML
BH CV ECHO MEAS - EDV(MOD-SP4): 58.9 ML
BH CV ECHO MEAS - EDV(TEICH): 61.2 ML
BH CV ECHO MEAS - EF(CUBED): 65.9 %
BH CV ECHO MEAS - EF(MOD-SP4): 58.4 %
BH CV ECHO MEAS - EF(TEICH): 58.2 %
BH CV ECHO MEAS - ESV(CUBED): 18.4 ML
BH CV ECHO MEAS - ESV(MOD-SP4): 24.5 ML
BH CV ECHO MEAS - ESV(TEICH): 25.6 ML
BH CV ECHO MEAS - FS: 30.2 %
BH CV ECHO MEAS - IVS/LVPW: 0.8
BH CV ECHO MEAS - IVSD: 0.98 CM
BH CV ECHO MEAS - LA DIMENSION: 3.1 CM
BH CV ECHO MEAS - LA/AO: 1.1
BH CV ECHO MEAS - LV DIASTOLIC VOL/BSA (35-75): 33.8 ML/M^2
BH CV ECHO MEAS - LV MASS(C)D: 134.4 GRAMS
BH CV ECHO MEAS - LV MASS(C)DI: 77.2 GRAMS/M^2
BH CV ECHO MEAS - LV MAX PG: 3.5 MMHG
BH CV ECHO MEAS - LV MEAN PG: 2 MMHG
BH CV ECHO MEAS - LV SYSTOLIC VOL/BSA (12-30): 14.1 ML/M^2
BH CV ECHO MEAS - LV V1 MAX: 93.8 CM/SEC
BH CV ECHO MEAS - LV V1 MEAN: 69 CM/SEC
BH CV ECHO MEAS - LV V1 VTI: 23 CM
BH CV ECHO MEAS - LVIDD: 3.8 CM
BH CV ECHO MEAS - LVIDS: 2.6 CM
BH CV ECHO MEAS - LVLD AP4: 6.4 CM
BH CV ECHO MEAS - LVLS AP4: 5.8 CM
BH CV ECHO MEAS - LVOT AREA (M): 3.1 CM^2
BH CV ECHO MEAS - LVOT AREA: 3.1 CM^2
BH CV ECHO MEAS - LVOT DIAM: 2 CM
BH CV ECHO MEAS - LVPWD: 1.2 CM
BH CV ECHO MEAS - MR MAX PG: 69.9 MMHG
BH CV ECHO MEAS - MR MAX VEL: 418 CM/SEC
BH CV ECHO MEAS - MV A MAX VEL: 80.5 CM/SEC
BH CV ECHO MEAS - MV DEC TIME: 0.3 SEC
BH CV ECHO MEAS - MV E MAX VEL: 66.5 CM/SEC
BH CV ECHO MEAS - MV E/A: 0.83
BH CV ECHO MEAS - RAP SYSTOLE: 10 MMHG
BH CV ECHO MEAS - RVSP: 39.2 MMHG
BH CV ECHO MEAS - SI(AO): 131.9 ML/M^2
BH CV ECHO MEAS - SI(CUBED): 20.5 ML/M^2
BH CV ECHO MEAS - SI(LVOT): 41.5 ML/M^2
BH CV ECHO MEAS - SI(MOD-SP4): 19.8 ML/M^2
BH CV ECHO MEAS - SI(TEICH): 20.5 ML/M^2
BH CV ECHO MEAS - SV(AO): 229.7 ML
BH CV ECHO MEAS - SV(CUBED): 35.6 ML
BH CV ECHO MEAS - SV(LVOT): 72.3 ML
BH CV ECHO MEAS - SV(MOD-SP4): 34.4 ML
BH CV ECHO MEAS - SV(TEICH): 35.6 ML
BH CV ECHO MEAS - TR MAX VEL: 270 CM/SEC
BH CV STRESS BP STAGE 1: NORMAL
BH CV STRESS BP STAGE 2: NORMAL
BH CV STRESS BP STAGE 3: NORMAL
BH CV STRESS DOB - ATROPINE STAGE 3: 0.3
BH CV STRESS DOSE DOBUTAMINE STAGE 1: 10
BH CV STRESS DOSE DOBUTAMINE STAGE 2: 20
BH CV STRESS DOSE DOBUTAMINE STAGE 3: 30
BH CV STRESS DURATION MIN STAGE 1: 3
BH CV STRESS DURATION MIN STAGE 2: 3
BH CV STRESS DURATION MIN STAGE 3: 2
BH CV STRESS DURATION SEC STAGE 1: 0
BH CV STRESS DURATION SEC STAGE 2: 0
BH CV STRESS DURATION SEC STAGE 3: 39
BH CV STRESS HR STAGE 1: 84
BH CV STRESS HR STAGE 2: 105
BH CV STRESS HR STAGE 3: 145
BH CV STRESS PROTOCOL 1: NORMAL
BH CV STRESS RECOVERY BP: NORMAL MMHG
BH CV STRESS RECOVERY HR: 103 BPM
BH CV STRESS STAGE 1: 1
BH CV STRESS STAGE 2: 2
BH CV STRESS STAGE 3: 3
BILIRUB SERPL-MCNC: 0.3 MG/DL (ref 0.1–1)
BUN BLD-MCNC: 14 MG/DL (ref 5–21)
BUN/CREAT SERPL: 17.1 (ref 7–25)
CALCIUM SPEC-SCNC: 9.1 MG/DL (ref 8.4–10.4)
CHLORIDE SERPL-SCNC: 97 MMOL/L (ref 98–110)
CO2 SERPL-SCNC: 32 MMOL/L (ref 24–31)
CREAT BLD-MCNC: 0.82 MG/DL (ref 0.5–1.4)
DEPRECATED RDW RBC AUTO: 43.2 FL (ref 40–54)
E/E' RATIO: 11.1
EOSINOPHIL # BLD AUTO: 0.27 10*3/MM3 (ref 0–0.7)
EOSINOPHIL NFR BLD AUTO: 5.7 % (ref 0–4)
ERYTHROCYTE [DISTWIDTH] IN BLOOD BY AUTOMATED COUNT: 12.7 % (ref 12–15)
GFR SERPL CREATININE-BSD FRML MDRD: 72 ML/MIN/1.73
GLOBULIN UR ELPH-MCNC: 2.7 GM/DL
GLUCOSE BLD-MCNC: 83 MG/DL (ref 70–100)
GLUCOSE BLDC GLUCOMTR-MCNC: 83 MG/DL (ref 70–130)
GLUCOSE BLDC GLUCOMTR-MCNC: 84 MG/DL (ref 70–130)
GLUCOSE BLDC GLUCOMTR-MCNC: 85 MG/DL (ref 70–130)
GLUCOSE BLDC GLUCOMTR-MCNC: 90 MG/DL (ref 70–130)
HCT VFR BLD AUTO: 35.1 % (ref 37–47)
HGB BLD-MCNC: 11.9 G/DL (ref 12–16)
IMM GRANULOCYTES # BLD: 0.03 10*3/MM3 (ref 0–0.03)
IMM GRANULOCYTES NFR BLD: 0.6 % (ref 0–5)
LEFT ATRIUM VOLUME INDEX: 19.3 ML/M2
LEFT ATRIUM VOLUME: 33.6 CM3
LYMPHOCYTES # BLD AUTO: 0.81 10*3/MM3 (ref 0.72–4.86)
LYMPHOCYTES NFR BLD AUTO: 17.2 % (ref 15–45)
MAGNESIUM SERPL-MCNC: 2 MG/DL (ref 1.4–2.2)
MAXIMAL PREDICTED HEART RATE: 165 BPM
MAXIMAL PREDICTED HEART RATE: 165 BPM
MCH RBC QN AUTO: 31.5 PG (ref 28–32)
MCHC RBC AUTO-ENTMCNC: 33.9 G/DL (ref 33–36)
MCV RBC AUTO: 92.9 FL (ref 82–98)
MONOCYTES # BLD AUTO: 0.72 10*3/MM3 (ref 0.19–1.3)
MONOCYTES NFR BLD AUTO: 15.3 % (ref 4–12)
NEUTROPHILS # BLD AUTO: 2.87 10*3/MM3 (ref 1.87–8.4)
NEUTROPHILS NFR BLD AUTO: 60.8 % (ref 39–78)
NRBC BLD MANUAL-RTO: 0 /100 WBC (ref 0–0)
PERCENT MAX PREDICTED HR: 87.88 %
PHOSPHATE SERPL-MCNC: 4.9 MG/DL (ref 2.5–4.5)
PLATELET # BLD AUTO: 280 10*3/MM3 (ref 130–400)
PMV BLD AUTO: 9.5 FL (ref 6–12)
POTASSIUM BLD-SCNC: 4.3 MMOL/L (ref 3.5–5.3)
PROT SERPL-MCNC: 6.3 G/DL (ref 6.3–8.7)
RBC # BLD AUTO: 3.78 10*6/MM3 (ref 4.2–5.4)
SODIUM BLD-SCNC: 138 MMOL/L (ref 135–145)
STRESS BASELINE BP: NORMAL MMHG
STRESS BASELINE HR: 68 BPM
STRESS PERCENT HR: 103 %
STRESS POST EXERCISE DUR MIN: 8 MIN
STRESS POST EXERCISE DUR SEC: 39 SEC
STRESS POST PEAK BP: NORMAL MMHG
STRESS POST PEAK HR: 145 BPM
STRESS TARGET HR: 140 BPM
STRESS TARGET HR: 140 BPM
TROPONIN I SERPL-MCNC: 0.04 NG/ML (ref 0–0.03)
TROPONIN I SERPL-MCNC: 0.1 NG/ML (ref 0–0.03)
TROPONIN I SERPL-MCNC: <0.012 NG/ML (ref 0–0.03)
WBC NRBC COR # BLD: 4.72 10*3/MM3 (ref 4.8–10.8)

## 2018-02-13 PROCEDURE — 85025 COMPLETE CBC W/AUTO DIFF WBC: CPT | Performed by: FAMILY MEDICINE

## 2018-02-13 PROCEDURE — 82962 GLUCOSE BLOOD TEST: CPT

## 2018-02-13 PROCEDURE — 83735 ASSAY OF MAGNESIUM: CPT | Performed by: FAMILY MEDICINE

## 2018-02-13 PROCEDURE — 93350 STRESS TTE ONLY: CPT | Performed by: INTERNAL MEDICINE

## 2018-02-13 PROCEDURE — 84484 ASSAY OF TROPONIN QUANT: CPT | Performed by: FAMILY MEDICINE

## 2018-02-13 PROCEDURE — 94799 UNLISTED PULMONARY SVC/PX: CPT

## 2018-02-13 PROCEDURE — 93352 ADMIN ECG CONTRAST AGENT: CPT | Performed by: INTERNAL MEDICINE

## 2018-02-13 PROCEDURE — 80053 COMPREHEN METABOLIC PANEL: CPT | Performed by: FAMILY MEDICINE

## 2018-02-13 PROCEDURE — 25010000002 PERFLUTREN 6.52 MG/ML SUSPENSION: Performed by: INTERNAL MEDICINE

## 2018-02-13 PROCEDURE — 25010000002 PROMETHAZINE PER 50 MG: Performed by: FAMILY MEDICINE

## 2018-02-13 PROCEDURE — 93306 TTE W/DOPPLER COMPLETE: CPT

## 2018-02-13 PROCEDURE — 94760 N-INVAS EAR/PLS OXIMETRY 1: CPT

## 2018-02-13 PROCEDURE — 25010000002 HYDROMORPHONE PER 4 MG: Performed by: FAMILY MEDICINE

## 2018-02-13 PROCEDURE — 84100 ASSAY OF PHOSPHORUS: CPT | Performed by: FAMILY MEDICINE

## 2018-02-13 PROCEDURE — 84484 ASSAY OF TROPONIN QUANT: CPT | Performed by: NURSE PRACTITIONER

## 2018-02-13 PROCEDURE — 93306 TTE W/DOPPLER COMPLETE: CPT | Performed by: INTERNAL MEDICINE

## 2018-02-13 PROCEDURE — G0378 HOSPITAL OBSERVATION PER HR: HCPCS

## 2018-02-13 PROCEDURE — 94640 AIRWAY INHALATION TREATMENT: CPT

## 2018-02-13 PROCEDURE — 25010000003 DOBUTAMINE PER 250 MG: Performed by: INTERNAL MEDICINE

## 2018-02-13 PROCEDURE — 25010000002 ENOXAPARIN PER 10 MG: Performed by: FAMILY MEDICINE

## 2018-02-13 PROCEDURE — 93350 STRESS TTE ONLY: CPT

## 2018-02-13 PROCEDURE — 96361 HYDRATE IV INFUSION ADD-ON: CPT

## 2018-02-13 PROCEDURE — 93017 CV STRESS TEST TRACING ONLY: CPT

## 2018-02-13 PROCEDURE — 96376 TX/PRO/DX INJ SAME DRUG ADON: CPT

## 2018-02-13 PROCEDURE — 74246 X-RAY XM UPR GI TRC 2CNTRST: CPT

## 2018-02-13 PROCEDURE — 96372 THER/PROPH/DIAG INJ SC/IM: CPT

## 2018-02-13 PROCEDURE — 93018 CV STRESS TEST I&R ONLY: CPT | Performed by: INTERNAL MEDICINE

## 2018-02-13 RX ORDER — ATROPINE SULFATE 1 MG/ML
0.3 INJECTION, SOLUTION INTRAMUSCULAR; INTRAVENOUS; SUBCUTANEOUS ONCE
Status: COMPLETED | OUTPATIENT
Start: 2018-02-13 | End: 2018-02-13

## 2018-02-13 RX ORDER — DOBUTAMINE HYDROCHLORIDE 100 MG/100ML
10-50 INJECTION INTRAVENOUS CONTINUOUS
Status: DISCONTINUED | OUTPATIENT
Start: 2018-02-13 | End: 2018-02-14 | Stop reason: HOSPADM

## 2018-02-13 RX ORDER — IPRATROPIUM BROMIDE AND ALBUTEROL SULFATE 2.5; .5 MG/3ML; MG/3ML
3 SOLUTION RESPIRATORY (INHALATION)
Status: DISCONTINUED | OUTPATIENT
Start: 2018-02-13 | End: 2018-02-14 | Stop reason: HOSPADM

## 2018-02-13 RX ORDER — IPRATROPIUM BROMIDE AND ALBUTEROL SULFATE 2.5; .5 MG/3ML; MG/3ML
3 SOLUTION RESPIRATORY (INHALATION) ONCE
Status: DISCONTINUED | OUTPATIENT
Start: 2018-02-13 | End: 2018-02-14 | Stop reason: HOSPADM

## 2018-02-13 RX ORDER — PANTOPRAZOLE SODIUM 40 MG/1
40 TABLET, DELAYED RELEASE ORAL
Status: DISCONTINUED | OUTPATIENT
Start: 2018-02-13 | End: 2018-02-14 | Stop reason: HOSPADM

## 2018-02-13 RX ADMIN — PERFLUTREN 8.48 MG: 6.52 INJECTION, SUSPENSION INTRAVENOUS at 09:34

## 2018-02-13 RX ADMIN — TRAZODONE HYDROCHLORIDE 300 MG: 150 TABLET ORAL at 20:08

## 2018-02-13 RX ADMIN — ASPIRIN 325 MG: 325 TABLET, COATED ORAL at 11:36

## 2018-02-13 RX ADMIN — GABAPENTIN 800 MG: 400 CAPSULE ORAL at 05:06

## 2018-02-13 RX ADMIN — Medication 10 MCG/KG/MIN: at 09:22

## 2018-02-13 RX ADMIN — FAMOTIDINE 40 MG: 20 TABLET, FILM COATED ORAL at 11:36

## 2018-02-13 RX ADMIN — ENOXAPARIN SODIUM 40 MG: 40 INJECTION SUBCUTANEOUS at 20:08

## 2018-02-13 RX ADMIN — HYDROMORPHONE HYDROCHLORIDE 0.5 MG: 2 INJECTION, SOLUTION INTRAMUSCULAR; INTRAVENOUS; SUBCUTANEOUS at 17:23

## 2018-02-13 RX ADMIN — HYDROMORPHONE HYDROCHLORIDE 0.5 MG: 2 INJECTION, SOLUTION INTRAMUSCULAR; INTRAVENOUS; SUBCUTANEOUS at 03:08

## 2018-02-13 RX ADMIN — PROMETHAZINE HYDROCHLORIDE 12.5 MG: 25 INJECTION INTRAMUSCULAR; INTRAVENOUS at 05:06

## 2018-02-13 RX ADMIN — SODIUM CHLORIDE 100 ML/HR: 9 INJECTION, SOLUTION INTRAVENOUS at 20:17

## 2018-02-13 RX ADMIN — HYDROMORPHONE HYDROCHLORIDE 0.5 MG: 2 INJECTION, SOLUTION INTRAMUSCULAR; INTRAVENOUS; SUBCUTANEOUS at 08:19

## 2018-02-13 RX ADMIN — DESMOPRESSIN ACETATE 40 MG: 0.2 TABLET ORAL at 20:08

## 2018-02-13 RX ADMIN — HYDROMORPHONE HYDROCHLORIDE 0.5 MG: 2 INJECTION, SOLUTION INTRAMUSCULAR; INTRAVENOUS; SUBCUTANEOUS at 11:36

## 2018-02-13 RX ADMIN — IPRATROPIUM BROMIDE AND ALBUTEROL SULFATE 3 ML: 2.5; .5 SOLUTION RESPIRATORY (INHALATION) at 11:26

## 2018-02-13 RX ADMIN — PROMETHAZINE HYDROCHLORIDE 12.5 MG: 25 INJECTION INTRAMUSCULAR; INTRAVENOUS at 17:23

## 2018-02-13 RX ADMIN — HYDROMORPHONE HYDROCHLORIDE 0.5 MG: 2 INJECTION, SOLUTION INTRAMUSCULAR; INTRAVENOUS; SUBCUTANEOUS at 15:28

## 2018-02-13 RX ADMIN — BARIUM SULFATE 120 ML: 980 POWDER, FOR SUSPENSION ORAL at 14:30

## 2018-02-13 RX ADMIN — HYDROMORPHONE HYDROCHLORIDE 0.5 MG: 2 INJECTION, SOLUTION INTRAMUSCULAR; INTRAVENOUS; SUBCUTANEOUS at 05:18

## 2018-02-13 RX ADMIN — IPRATROPIUM BROMIDE AND ALBUTEROL SULFATE 3 ML: 2.5; .5 SOLUTION RESPIRATORY (INHALATION) at 20:38

## 2018-02-13 RX ADMIN — BARIUM SULFATE 120 ML: 960 POWDER, FOR SUSPENSION ORAL at 14:30

## 2018-02-13 RX ADMIN — ATROPINE SULFATE 0.3 MG: 1 INJECTION, SOLUTION INTRAMUSCULAR; INTRAVENOUS; SUBCUTANEOUS at 09:34

## 2018-02-13 RX ADMIN — HYDROMORPHONE HYDROCHLORIDE 0.5 MG: 2 INJECTION, SOLUTION INTRAMUSCULAR; INTRAVENOUS; SUBCUTANEOUS at 20:08

## 2018-02-13 RX ADMIN — PROMETHAZINE HYDROCHLORIDE 12.5 MG: 25 INJECTION INTRAMUSCULAR; INTRAVENOUS at 11:35

## 2018-02-13 RX ADMIN — ESTRADIOL 2 MG: 2 TABLET ORAL at 11:36

## 2018-02-13 RX ADMIN — GABAPENTIN 800 MG: 400 CAPSULE ORAL at 20:08

## 2018-02-13 NOTE — PLAN OF CARE
Problem: Fall Risk (Adult)  Goal: Absence of Falls  Outcome: Unable to achieve outcome(s) by discharge Date Met: 02/13/18  Patient found in floor going from bathroom back to bed. JOSE DAVID SALDIVAR aware.    02/12/18 2100   Fall Risk (Adult)   Absence of Falls unable to achieve outcome

## 2018-02-13 NOTE — PLAN OF CARE
Problem: Patient Care Overview (Adult)  Goal: Plan of Care Review  Outcome: Ongoing (interventions implemented as appropriate)   02/13/18 1513   Coping/Psychosocial Response Interventions   Plan Of Care Reviewed With patient   Patient Care Overview   Progress no change   Outcome Evaluation   Outcome Summary/Follow up Plan remain swith c/o pain and nausea, medicated per order with adaquate relief. awaiting results of barium swallow, sts neg. tele reads NSR. will cont to monitor       Problem: Pain, Chronic (Adult)  Goal: Identify Related Risk Factors and Signs and Symptoms  Outcome: Ongoing (interventions implemented as appropriate)    Goal: Acceptable Pain Control/Comfort Level  Outcome: Ongoing (interventions implemented as appropriate)

## 2018-02-13 NOTE — H&P
[facility] Medicine Services  HISTORY AND PHYSICAL    Date of Admission: 2/12/2018  Primary Care Physician: Jose Ramon Agarwal MD    Subjective     Chief Complaint: Chest discomfort    History of Present Illness  57-year-old female with past medical history of multiple sclerosis being followed by neurologist as outpatient comes into the ER with left-sided chest pain.  Patient states it started 3 days ago.  Spent progressively getting worse.  Pain is 6 out of 10 intensity, pressure in quality and radiating toward left arm.  Her pain is exacerbated by exertion.  Nothing makes the pain better.  Patient was recently admitted to the hospital for similar complaints and cardiac workup was negative.  Patient's initial d-dimer was elevated but CTA was negative for PE.        Review of Systems     Otherwise complete ROS reviewed and negative except as mentioned in the HPI.    Past Medical History:   Past Medical History:   Diagnosis Date   • MS (multiple sclerosis)      Past Surgical History:  Past Surgical History:   Procedure Laterality Date   • APPENDECTOMY     • CHOLECYSTECTOMY     • HAND RECONSTRUCTION Right    • HYSTERECTOMY     • REPLACEMENT TOTAL KNEE Left      Social History:  reports that she has quit smoking. She has never used smokeless tobacco. She reports that she does not drink alcohol or use illicit drugs.    Family History: family history includes Breast cancer in her maternal aunt.      Allergies:  Allergies   Allergen Reactions   • Zofran [Ondansetron Hcl] Itching   • Ciprofloxacin Rash   • Penicillins Rash   • Sulfa Antibiotics Rash   • Thorazine [Chlorpromazine] Anxiety     Medications:  Prior to Admission medications    Medication Sig Start Date End Date Taking? Authorizing Provider   acetaminophen (TYLENOL) 325 MG tablet Take 300 mg by mouth Every 8 (Eight) Hours As Needed for Mild Pain .   Yes Historical Provider, MD   estradiol (ESTRACE) 2 MG tablet Take 2 mg by  "mouth Daily.   Yes Historical Provider, MD   fingolimod (GILENYA) 0.5 MG capsule Take 0.5 mg by mouth Daily.   Yes Historical Provider, MD   gabapentin (NEURONTIN) 800 MG tablet Take 900 mg by mouth 3 (Three) Times a Day.   Yes Historical Provider, MD   HYDROcodone-acetaminophen (NORCO) 5-325 MG per tablet Take 1 tablet by mouth Every 8 (Eight) Hours As Needed.   Yes Historical Provider, MD   traZODone (DESYREL) 150 MG tablet Take 300 mg by mouth Every Night.   Yes Historical Provider, MD     Objective     Vital Signs: /53 (BP Location: Left arm, Patient Position: Sitting)  Pulse 85  Temp 97.4 °F (36.3 °C) (Temporal Artery )   Resp 17  Ht 162.6 cm (64.02\")  Wt 70.8 kg (156 lb 2 oz)  SpO2 97%  BMI 26.79 kg/m2  Physical Exam   Constitutional: She is oriented to person, place, and time. She appears well-developed and well-nourished.   Mild distress due to chest discomfort   HENT:   Head: Normocephalic and atraumatic.   Eyes: EOM are normal. Pupils are equal, round, and reactive to light.   Neck: Normal range of motion. Neck supple.   Cardiovascular: Normal rate, regular rhythm and normal heart sounds.    Pulmonary/Chest: Effort normal and breath sounds normal.   Abdominal: Soft. Bowel sounds are normal.   Musculoskeletal: Normal range of motion.   Neurological: She is alert and oriented to person, place, and time.   Skin: Skin is warm.   Psychiatric: She has a normal mood and affect. Her behavior is normal. Judgment and thought content normal.             Results Reviewed:  Lab Results (last 24 hours)     Procedure Component Value Units Date/Time    CBC & Differential [[id number]] Collected:  02/12/18 1623    Specimen:  Blood Updated:  02/12/18 1633    Narrative:       The following orders were created for panel order CBC & Differential.  Procedure                               Abnormality         Status                     ---------                               -----------         ------                   "   CBC Auto Differential[[id number]]        Abnormal            Final result                 Please view results for these tests on the individual orders.    CBC Auto Differential [[id number]]  (Abnormal) Collected:  02/12/18 1623    Specimen:  Blood Updated:  02/12/18 1633     WBC 6.38 10*3/mm3      RBC 4.09 (L) 10*6/mm3      Hemoglobin 12.8 g/dL      Hematocrit 38.5 %      MCV 94.1 fL      MCH 31.3 pg      MCHC 33.2 g/dL      RDW 12.5 %      RDW-SD 43.7 fl      MPV 9.2 fL      Platelets 325 10*3/mm3      Neutrophil % 60.7 %      Lymphocyte % 22.3 %      Monocyte % 10.8 %      Eosinophil % 5.5 (H) %      Basophil % 0.2 %      Immature Grans % 0.5 %      Neutrophils, Absolute 3.88 10*3/mm3      Lymphocytes, Absolute 1.42 10*3/mm3      Monocytes, Absolute 0.69 10*3/mm3      Eosinophils, Absolute 0.35 10*3/mm3      Basophils, Absolute 0.01 10*3/mm3      Immature Grans, Absolute 0.03 10*3/mm3      nRBC 0.0 /100 WBC     Comprehensive Metabolic Panel [[id number]] Collected:  02/12/18 1623    Specimen:  Blood Updated:  02/12/18 1642     Glucose 92 mg/dL      BUN 16 mg/dL      Creatinine 0.82 mg/dL      Sodium 140 mmol/L      Potassium 4.4 mmol/L      Chloride 98 mmol/L      CO2 30.0 mmol/L      Calcium 9.4 mg/dL      Total Protein 6.8 g/dL      Albumin 4.10 g/dL      ALT (SGPT) 40 U/L      AST (SGOT) 41 U/L      Alkaline Phosphatase 65 U/L      Total Bilirubin 0.3 mg/dL      eGFR Non African Amer 72 mL/min/1.73      Globulin 2.7 gm/dL      A/G Ratio 1.5 g/dL      BUN/Creatinine Ratio 19.5     Anion Gap 12.0 mmol/L     Amylase [[id number]]  (Normal) Collected:  02/12/18 1623    Specimen:  Blood Updated:  02/12/18 1643     Amylase 65 U/L     Lipase [[id number]]  (Normal) Collected:  02/12/18 1623    Specimen:  Blood Updated:  02/12/18 1643     Lipase 106 U/L     D-dimer, Quantitative [[id number]]  (Abnormal) Collected:  02/12/18 1623    Specimen:  Blood Updated:  02/12/18 1650     D-Dimer, Quantitative 0.71 (H) mg/L (FEU)      Narrative:       Reference Range is 0-0.50 mg/L FEU. However, results <0.50 mg/L FEU tends to rule out DVT or PE. Results >0.50 mg/L FEU are not useful in predicting absence or presence of DVT or PE.    Troponin [[id number]]  (Normal) Collected:  02/12/18 1623    Specimen:  Blood Updated:  02/12/18 1654     Troponin I <0.012 ng/mL     [place] Draw [[id number]] Collected:  02/12/18 1623    Specimen:  Blood Updated:  02/12/18 1731    Narrative:       The following orders were created for panel order [place] Draw.  Procedure                               Abnormality         Status                     ---------                               -----------         ------                     Light Blue Top[[id number]]                                   Final result               Green Top (Gel)[[id number]]                                  Final result               Lavender Top[[id number]]                                     Final result               Red Top[[id number]]                                          Final result                 Please view results for these tests on the individual orders.    Light Blue Top [[id number]] Collected:  02/12/18 1623    Specimen:  Blood Updated:  02/12/18 1731     Extra Tube hold for add-on      Auto resulted       Green Top (Gel) [[id number]] Collected:  02/12/18 1623    Specimen:  Blood Updated:  02/12/18 1731     Extra Tube Hold for add-ons.      Auto resulted.       Lavender Top [[id number]] Collected:  02/12/18 1623    Specimen:  Blood Updated:  02/12/18 1731     Extra Tube hold for add-on      Auto resulted       Red Top [[id number]] Collected:  02/12/18 1623    Specimen:  Blood Updated:  02/12/18 1731     Extra Tube Hold for add-ons.      Auto resulted.       Troponin [[id number]]  (Normal) Collected:  02/12/18 1904    Specimen:  Blood Updated:  02/12/18 1933     Troponin I <0.012 ng/mL         Imaging Results (last 24 hours)     Procedure Component Value Units Date/Time    XR Chest 1 View  [[id number]] Collected:  02/12/18 1638     Updated:  02/12/18 1642    Narrative:       HISTORY: Chest pain     CXR: Frontal view the chest performed.     COMPARISON: 02/06/2018     FINDINGS: The lungs are free of consolidation, collapse, or edema. Few  calcified granuloma are suspected. The cardiac and mediastinal contours  are normal. There is no pleural effusion or pneumothorax. There is no  acute bony pathology.       Impression:       1. No acute cardiopulmonary process..  This report was finalized on 02/12/2018 16:39 by Dr. Gladys Gonzalez MD.    CT Angiogram Chest With Contrast [[id number]] Collected:  02/12/18 1815     Updated:  02/12/18 1821    Narrative:       CT ANGIOGRAM CHEST W CONTRAST- 2/12/2018 5:30 PM CST      HISTORY: Chest pain, acute, PE suspected, intermed prob, positive  D-dimer      COMPARISON: 11/12/2014     DOSE LENGTH PRODUCT: 446 mGy cm. Automated exposure control was also  utilized to decrease patient radiation dose.     TECHNIQUE: Helical tomographic images of the chest were obtained after  the administration of intravenous contrast following angiogram protocol.  Additionally, 3D and multiplanar reformatted images were provided.        FINDINGS:    Pulmonary arteries: There is adequate enhancement of the pulmonary  arteries to evaluate for central and segmental pulmonary emboli. There  are no filling defects within the main, lobar, segmental or visualized  subsegmental pulmonary arteries.    .      Aorta and great vessels: The aorta is well opacified and demonstrates no  dissection or aneurysm. The great vessels are normal in appearance.     Visualized neck base: The imaged portion of the base of the neck appears  unremarkable.      Lungs: The lungs are clear. There is no mass, worrisome nodule, or  consolidation.     Small bilateral pleural effusions are present.. The trachea and  bronchial tree are patent.      Heart: The heart is normal in size. There is no pericardial effusion.       Mediastinum and lymph nodes: No enlarged mediastinal, hilar, or axillary  lymph nodes are present.      Skeletal and soft tissues: The osseous structures of the thorax and  surrounding soft tissues demonstrate no acute process.     Upper abdomen: Nonobstructing renal calculi are present bilaterally..        Impression:       1. No evidence of embolic disease.  2. Small bilateral posterior pleural effusions  3. Bilateral nonobstructing renal calculi.        This report was finalized on 02/12/2018 18:18 by Dr. Derek Major MD.        I have personally reviewed and interpreted the radiology studies and ECG obtained at time of admission.     Assessment / Plan     Assessment:   Hospital Problem List     Chest pain        1.  Chest discomfort rule out MI  2.  Elevated d-dimer  3.  Hyperglycemia      Plan:      -Admit to telemetry  -Continue cardiac monitoring  -Continuous pulse ox  -Follow-up subsequent troponin level  -Follow-up echocardiogram  -Pain management  -Consider cardiology consult in the a.m. if indicated  -D-dimer elevated-CTA negative  -Strict glycemic control  -GI prophylaxis  -DVT prophylaxis      Code Status: Full code     I discussed the patients findings and my recommendations with the patient's RN    Estimated length of stay 1-2 days    Marianne Polanco MD   02/12/18   8:36 PM

## 2018-02-13 NOTE — PROGRESS NOTES
[facility] Medicine Services  INPATIENT PROGRESS NOTE    Length of Stay: 0  Date of Admission: 2/12/2018  Primary Care Physician: Jose Ramon Agarwal MD    Subjective   Chief Complaint: Follow up chest pain  HPI   Pt continues to complain of chest pain. States she has a heaviness all the time followed by sharp stinging pain that shoots across her chest. She has had significant nausea vomiting this am. Dobutamine stress echo was low risk for ischemia, however, she has continued to have chest pain and nausea/vomiting. We opted to perform upper GI. Pt was very lethargic after receiving phenergan but states her nausea is improving. Repeat troponin was very mildly elevated.    Review of Systems   All pertinent negatives and positives are as above. All other systems have been reviewed and are negative unless otherwise stated.     Objective    Temp:  [97.3 °F (36.3 °C)-98.8 °F (37.1 °C)] 98.8 °F (37.1 °C)  Heart Rate:  [65-85] 77  Resp:  [16-18] 18  BP: (101-153)/(47-83) 141/66  Physical Exam   Constitutional: She is oriented to person, place, and time. She appears well-developed and well-nourished.   HENT:   Head: Normocephalic and atraumatic.   Eyes: Conjunctivae and EOM are normal. Pupils are equal, round, and reactive to light.   Neck: Neck supple. No JVD present. No thyromegaly present.   Cardiovascular: Normal rate, regular rhythm, normal heart sounds and intact distal pulses.  Exam reveals no gallop and no friction rub.    No murmur heard.  Pulmonary/Chest: Effort normal and breath sounds normal. No respiratory distress. She has no wheezes. She has no rales. She exhibits no tenderness.   Abdominal: Soft. Bowel sounds are normal. She exhibits no distension. There is no tenderness. There is no rebound and no guarding.   Musculoskeletal: Normal range of motion. She exhibits no edema, tenderness or deformity.   Lymphadenopathy:     She has no cervical adenopathy.   Neurological: She is  alert and oriented to person, place, and time. She displays normal reflexes. No cranial nerve deficit. She exhibits normal muscle tone.   Skin: Skin is warm and dry. No rash noted.   Psychiatric: She has a normal mood and affect. Her behavior is normal. Judgment and thought content normal.     Results Review:  I have reviewed the labs, radiology results, and diagnostic studies.    Laboratory Data:     Results from last 7 days  Lab Units 02/13/18  0659 02/12/18  1623   WBC 10*3/mm3 4.72* 6.38   HEMOGLOBIN g/dL 11.9* 12.8   HEMATOCRIT % 35.1* 38.5   PLATELETS 10*3/mm3 280 325       Results from last 7 days  Lab Units 02/13/18  0659 02/12/18  1623   SODIUM mmol/L 138 140   POTASSIUM mmol/L 4.3 4.4   CHLORIDE mmol/L 97* 98   CO2 mmol/L 32.0* 30.0   BUN mg/dL 14 16   CREATININE mg/dL 0.82 0.82   CALCIUM mg/dL 9.1 9.4   BILIRUBIN mg/dL 0.3 0.3   ALK PHOS U/L 60 65   ALT (SGPT) U/L 42 40   AST (SGOT) U/L 50* 41   GLUCOSE mg/dL 83 92     Radiology Data:   Imaging Results (last 24 hours)     Procedure Component Value Units Date/Time    XR Chest 1 View [[id number]] Collected:  02/12/18 1638     Updated:  02/12/18 1642    Narrative:       HISTORY: Chest pain     CXR: Frontal view the chest performed.     COMPARISON: 02/06/2018     FINDINGS: The lungs are free of consolidation, collapse, or edema. Few  calcified granuloma are suspected. The cardiac and mediastinal contours  are normal. There is no pleural effusion or pneumothorax. There is no  acute bony pathology.       Impression:       1. No acute cardiopulmonary process..  This report was finalized on 02/12/2018 16:39 by Dr. Gladys Gonzalez MD.    CT Angiogram Chest With Contrast [[id number]] Collected:  02/12/18 1815     Updated:  02/12/18 1821    Narrative:       CT ANGIOGRAM CHEST W CONTRAST- 2/12/2018 5:30 PM CST      HISTORY: Chest pain, acute, PE suspected, intermed prob, positive  D-dimer      COMPARISON: 11/12/2014     DOSE LENGTH PRODUCT: 446 mGy cm. Automated  exposure control was also  utilized to decrease patient radiation dose.     TECHNIQUE: Helical tomographic images of the chest were obtained after  the administration of intravenous contrast following angiogram protocol.  Additionally, 3D and multiplanar reformatted images were provided.        FINDINGS:    Pulmonary arteries: There is adequate enhancement of the pulmonary  arteries to evaluate for central and segmental pulmonary emboli. There  are no filling defects within the main, lobar, segmental or visualized  subsegmental pulmonary arteries.    .      Aorta and great vessels: The aorta is well opacified and demonstrates no  dissection or aneurysm. The great vessels are normal in appearance.     Visualized neck base: The imaged portion of the base of the neck appears  unremarkable.      Lungs: The lungs are clear. There is no mass, worrisome nodule, or  consolidation.     Small bilateral pleural effusions are present.. The trachea and  bronchial tree are patent.      Heart: The heart is normal in size. There is no pericardial effusion.      Mediastinum and lymph nodes: No enlarged mediastinal, hilar, or axillary  lymph nodes are present.      Skeletal and soft tissues: The osseous structures of the thorax and  surrounding soft tissues demonstrate no acute process.     Upper abdomen: Nonobstructing renal calculi are present bilaterally..        Impression:       1. No evidence of embolic disease.  2. Small bilateral posterior pleural effusions  3. Bilateral nonobstructing renal calculi.        This report was finalized on 02/12/2018 18:18 by Dr. Derek Major MD.    CT Head Without Contrast [[id number]] Collected:  02/12/18 2204     Updated:  02/12/18 2208    Narrative:       CT BRAIN without contrast dated 2/12/2018 9:45 PM CST     HISTORY: Fell and hit for head     COMPARISON: None      DOSE LENGTH PRODUCT: 533 mGy cm     In order to have a CT radiation dose as low as reasonably achievable,  Automated Exposure  Control was utilized for adjustment of the mA and/or  KV according to patient size.     TECHNIQUE: Serial axial tomographic images of the brain were obtained  without the use of intravenous contrast.      FINDINGS:   The midline structures are nondisplaced. The ventricles and basilar  cisterns are normal in size and configuration. There is no evidence of  intracranial hemorrhage or mass-effect. The gray-white matter  differentiation is maintained. Faint calcifications noted in the basal  ganglia region. The sulci have a normal configuration, and there are no  abnormal extra-axial fluid collections. The structures of the posterior  fossa are unremarkable.      The included orbits and their contents are unremarkable. The visualized  paranasal sinuses, mastoid air cells and middle ear cavities are clear.  The visualized osseous structures and overlying soft tissues of the  skull and face are unremarkable.        Impression:       1. No acute intracranial process.        This report was finalized on 02/12/2018 22:04 by Dr. Derek Major MD.    FL Upper GI With Air Contrast [[id number]] Collected:  02/13/18 1439     Updated:  02/13/18 1444    Narrative:       HISTORY: Noncardiac chest pain with nausea     Upper GI exam: 28 spot views of the upper gastric intestinal system are  performed during an upper GI exam. Fluoroscopy time 1.9 minutes     Double contrast assessment of the upper gastric intestinal system is  performed.     There is normal esophageal peristalsis. No esophageal mucosal mass or  ulcer is identified. There is no hiatal hernia. There is mild  intermittent gastroesophageal reflux.     No discrete gastric mucosal mass or ulcer is identified. There is normal  gastric emptying. Images of the duodenum are unremarkable.       Impression:       1. Mild intermittent gastroesophageal reflux. No mucosal mass or  ulceration. No hiatal hernia.  This report was finalized on 02/13/2018 14:41 by Dr. Gladys Gonzalez MD.         I have reviewed the patient current medications.     Assessment/Plan   Assessment:  1. Chest pain-non cardiac  2. Elevated D-dimer-no evidence of pulmonary embolus on CT  3. Multiple Sclerosis  4. Nausea/vomiting  5. Hyperglycemia  6. GERD    Plan:  1. Will begin protonix twice daily for now.   2. Discontinue pepcid  3. Repeat troponin in 6 hours.   4. Continue IV fluids given her vomiting  5. Repeat labs in am.     Discharge Planning: I expect the patient to be discharged to home in 1 days.    SURESH Jacinto   02/13/18   3:12 PM   I personally evaluated and examined the patient in conjunction with SURESH Wharton and agree with the assessment, treatment plan, and disposition of the patient as recorded by her. My history, exam, and further recommendations are:   Patient was drowsy after she received Phenergan when I visited her with Akiko.  Her stress test is negative. CTA showed no evidence of PE.  Vitals:    02/13/18 1546   BP: 131/61   Pulse: 80   Resp: 18   Temp: 98.4 °F (36.9 °C)   SpO2: 94%     CPT  Agree with above      Fidel Abreu MD  02/13/18  6:49 PM

## 2018-02-13 NOTE — PLAN OF CARE
Problem: Patient Care Overview (Adult)  Goal: Plan of Care Review  Outcome: Ongoing (interventions implemented as appropriate)   02/13/18 0550   Coping/Psychosocial Response Interventions   Plan Of Care Reviewed With patient   Patient Care Overview   Progress no change   Outcome Evaluation   Outcome Summary/Follow up Plan Patient admitted from ER with chest pain. Has hx of MS. States pain is a sharp/stabbing sensation and is on right side of chest, radiating to left shoulder. PRN dilaudid given and phenergan to help with nausea. NSR on tele. Echo planned for today. Continue to monitor.      Goal: Adult Individualization and Mutuality  Outcome: Ongoing (interventions implemented as appropriate)    Goal: Discharge Needs Assessment  Outcome: Ongoing (interventions implemented as appropriate)      Problem: Pain, Chronic (Adult)  Goal: Identify Related Risk Factors and Signs and Symptoms  Outcome: Ongoing (interventions implemented as appropriate)    Goal: Acceptable Pain Control/Comfort Level  Outcome: Ongoing (interventions implemented as appropriate)      Problem: Fall Risk (Adult)  Goal: Identify Related Risk Factors and Signs and Symptoms  Outcome: Ongoing (interventions implemented as appropriate)    Goal: Absence of Falls  Outcome: Ongoing (interventions implemented as appropriate)

## 2018-02-14 VITALS
RESPIRATION RATE: 18 BRPM | DIASTOLIC BLOOD PRESSURE: 46 MMHG | HEIGHT: 64 IN | TEMPERATURE: 98.2 F | WEIGHT: 155.9 LBS | SYSTOLIC BLOOD PRESSURE: 127 MMHG | OXYGEN SATURATION: 94 % | BODY MASS INDEX: 26.61 KG/M2 | HEART RATE: 96 BPM

## 2018-02-14 LAB
ALBUMIN SERPL-MCNC: 3.6 G/DL (ref 3.5–5)
ALBUMIN/GLOB SERPL: 1.4 G/DL (ref 1.1–2.5)
ALP SERPL-CCNC: 57 U/L (ref 24–120)
ALT SERPL W P-5'-P-CCNC: 42 U/L (ref 0–54)
AMYLASE SERPL-CCNC: 56 U/L (ref 30–110)
ANION GAP SERPL CALCULATED.3IONS-SCNC: 7 MMOL/L (ref 4–13)
AST SERPL-CCNC: 42 U/L (ref 7–45)
BILIRUB SERPL-MCNC: 0.5 MG/DL (ref 0.1–1)
BUN BLD-MCNC: 12 MG/DL (ref 5–21)
BUN/CREAT SERPL: 15 (ref 7–25)
CALCIUM SPEC-SCNC: 8.7 MG/DL (ref 8.4–10.4)
CHLORIDE SERPL-SCNC: 98 MMOL/L (ref 98–110)
CO2 SERPL-SCNC: 33 MMOL/L (ref 24–31)
CREAT BLD-MCNC: 0.8 MG/DL (ref 0.5–1.4)
DEPRECATED RDW RBC AUTO: 43.3 FL (ref 40–54)
ERYTHROCYTE [DISTWIDTH] IN BLOOD BY AUTOMATED COUNT: 12.7 % (ref 12–15)
FLUAV AG NPH QL: NEGATIVE
FLUBV AG NPH QL IA: NEGATIVE
GFR SERPL CREATININE-BSD FRML MDRD: 74 ML/MIN/1.73
GLOBULIN UR ELPH-MCNC: 2.5 GM/DL
GLUCOSE BLD-MCNC: 78 MG/DL (ref 70–100)
GLUCOSE BLDC GLUCOMTR-MCNC: 101 MG/DL (ref 70–130)
GLUCOSE BLDC GLUCOMTR-MCNC: 91 MG/DL (ref 70–130)
HCT VFR BLD AUTO: 33.7 % (ref 37–47)
HGB BLD-MCNC: 11.4 G/DL (ref 12–16)
LIPASE SERPL-CCNC: 60 U/L (ref 23–203)
MCH RBC QN AUTO: 31.4 PG (ref 28–32)
MCHC RBC AUTO-ENTMCNC: 33.8 G/DL (ref 33–36)
MCV RBC AUTO: 92.8 FL (ref 82–98)
PLATELET # BLD AUTO: 244 10*3/MM3 (ref 130–400)
PMV BLD AUTO: 9.6 FL (ref 6–12)
POTASSIUM BLD-SCNC: 4.1 MMOL/L (ref 3.5–5.3)
PROT SERPL-MCNC: 6.1 G/DL (ref 6.3–8.7)
RBC # BLD AUTO: 3.63 10*6/MM3 (ref 4.2–5.4)
S PYO AG THROAT QL: NEGATIVE
SODIUM BLD-SCNC: 138 MMOL/L (ref 135–145)
WBC NRBC COR # BLD: 5.25 10*3/MM3 (ref 4.8–10.8)

## 2018-02-14 PROCEDURE — 87880 STREP A ASSAY W/OPTIC: CPT | Performed by: NURSE PRACTITIONER

## 2018-02-14 PROCEDURE — 96376 TX/PRO/DX INJ SAME DRUG ADON: CPT

## 2018-02-14 PROCEDURE — 85027 COMPLETE CBC AUTOMATED: CPT | Performed by: NURSE PRACTITIONER

## 2018-02-14 PROCEDURE — G0378 HOSPITAL OBSERVATION PER HR: HCPCS

## 2018-02-14 PROCEDURE — 94799 UNLISTED PULMONARY SVC/PX: CPT

## 2018-02-14 PROCEDURE — 80053 COMPREHEN METABOLIC PANEL: CPT | Performed by: NURSE PRACTITIONER

## 2018-02-14 PROCEDURE — 87081 CULTURE SCREEN ONLY: CPT | Performed by: NURSE PRACTITIONER

## 2018-02-14 PROCEDURE — 83690 ASSAY OF LIPASE: CPT | Performed by: NURSE PRACTITIONER

## 2018-02-14 PROCEDURE — 25010000002 PROMETHAZINE PER 50 MG: Performed by: FAMILY MEDICINE

## 2018-02-14 PROCEDURE — 82962 GLUCOSE BLOOD TEST: CPT

## 2018-02-14 PROCEDURE — 63710000001 PROMETHAZINE PER 25 MG: Performed by: FAMILY MEDICINE

## 2018-02-14 PROCEDURE — 87804 INFLUENZA ASSAY W/OPTIC: CPT | Performed by: NURSE PRACTITIONER

## 2018-02-14 PROCEDURE — 96361 HYDRATE IV INFUSION ADD-ON: CPT

## 2018-02-14 PROCEDURE — 25010000002 HYDROMORPHONE PER 4 MG: Performed by: FAMILY MEDICINE

## 2018-02-14 PROCEDURE — 94760 N-INVAS EAR/PLS OXIMETRY 1: CPT

## 2018-02-14 PROCEDURE — 82150 ASSAY OF AMYLASE: CPT | Performed by: NURSE PRACTITIONER

## 2018-02-14 RX ORDER — PANTOPRAZOLE SODIUM 40 MG/1
40 TABLET, DELAYED RELEASE ORAL DAILY
Qty: 30 TABLET | Refills: 0 | Status: SHIPPED | OUTPATIENT
Start: 2018-02-14 | End: 2019-10-31 | Stop reason: ALTCHOICE

## 2018-02-14 RX ORDER — PROMETHAZINE HYDROCHLORIDE 12.5 MG/1
12.5 TABLET ORAL EVERY 6 HOURS PRN
Qty: 10 TABLET | Refills: 0 | Status: SHIPPED | OUTPATIENT
Start: 2018-02-14 | End: 2022-04-26

## 2018-02-14 RX ADMIN — PANTOPRAZOLE SODIUM 40 MG: 40 TABLET, DELAYED RELEASE ORAL at 09:50

## 2018-02-14 RX ADMIN — PROMETHAZINE HYDROCHLORIDE 12.5 MG: 25 TABLET ORAL at 15:09

## 2018-02-14 RX ADMIN — ASPIRIN 325 MG: 325 TABLET, COATED ORAL at 09:50

## 2018-02-14 RX ADMIN — GABAPENTIN 800 MG: 400 CAPSULE ORAL at 05:47

## 2018-02-14 RX ADMIN — PROMETHAZINE HYDROCHLORIDE 12.5 MG: 25 INJECTION INTRAMUSCULAR; INTRAVENOUS at 08:26

## 2018-02-14 RX ADMIN — HYDROMORPHONE HYDROCHLORIDE 0.5 MG: 2 INJECTION, SOLUTION INTRAMUSCULAR; INTRAVENOUS; SUBCUTANEOUS at 03:40

## 2018-02-14 RX ADMIN — HYDROMORPHONE HYDROCHLORIDE 0.5 MG: 2 INJECTION, SOLUTION INTRAMUSCULAR; INTRAVENOUS; SUBCUTANEOUS at 00:54

## 2018-02-14 RX ADMIN — IPRATROPIUM BROMIDE AND ALBUTEROL SULFATE 3 ML: 2.5; .5 SOLUTION RESPIRATORY (INHALATION) at 06:42

## 2018-02-14 RX ADMIN — HYDROMORPHONE HYDROCHLORIDE 0.5 MG: 2 INJECTION, SOLUTION INTRAMUSCULAR; INTRAVENOUS; SUBCUTANEOUS at 08:21

## 2018-02-14 RX ADMIN — PROMETHAZINE HYDROCHLORIDE 12.5 MG: 25 INJECTION INTRAMUSCULAR; INTRAVENOUS at 00:54

## 2018-02-14 RX ADMIN — HYDROMORPHONE HYDROCHLORIDE 0.5 MG: 2 INJECTION, SOLUTION INTRAMUSCULAR; INTRAVENOUS; SUBCUTANEOUS at 11:05

## 2018-02-14 RX ADMIN — IPRATROPIUM BROMIDE AND ALBUTEROL SULFATE 3 ML: 2.5; .5 SOLUTION RESPIRATORY (INHALATION) at 10:52

## 2018-02-14 RX ADMIN — ESTRADIOL 2 MG: 2 TABLET ORAL at 09:50

## 2018-02-14 RX ADMIN — SODIUM CHLORIDE 100 ML/HR: 9 INJECTION, SOLUTION INTRAVENOUS at 05:48

## 2018-02-14 NOTE — DISCHARGE SUMMARY
[facility] Medicine Services  DISCHARGE SUMMARY       Date of Admission: 2/12/2018  Date of Discharge:  2/14/2018  Primary Care Physician: Jose Ramon Agarwal MD    Presenting Problem/History of Present Illness:  Chest pain    Final Discharge Diagnoses:  1. Chest pain-non cardiac-likely related to candida esophagitis  2. Elevated D-dimer-no evidence of pulmonary embolus on CT  3. Multiple Sclerosis  4. Nausea/vomiting  5. Hyperglycemia  6. GERD  7. Oral candidiasis with suspected candida esophagitis    Consults: None    Procedures Performed:   1. Dobutamine Stress Echocardiogram-    Pertinent Test Results:   Imaging Results (last 7 days)     Procedure Component Value Units Date/Time    XR Chest 1 View [[id number]] Collected:  02/12/18 1638     Updated:  02/12/18 1642    Narrative:       HISTORY: Chest pain     CXR: Frontal view the chest performed.     COMPARISON: 02/06/2018     FINDINGS: The lungs are free of consolidation, collapse, or edema. Few  calcified granuloma are suspected. The cardiac and mediastinal contours  are normal. There is no pleural effusion or pneumothorax. There is no  acute bony pathology.       Impression:       1. No acute cardiopulmonary process..  This report was finalized on 02/12/2018 16:39 by Dr. Gladys Gonzalez MD.    CT Angiogram Chest With Contrast [[id number]] Collected:  02/12/18 1815     Updated:  02/12/18 1821    Narrative:       CT ANGIOGRAM CHEST W CONTRAST- 2/12/2018 5:30 PM CST      HISTORY: Chest pain, acute, PE suspected, intermed prob, positive  D-dimer      COMPARISON: 11/12/2014     DOSE LENGTH PRODUCT: 446 mGy cm. Automated exposure control was also  utilized to decrease patient radiation dose.     TECHNIQUE: Helical tomographic images of the chest were obtained after  the administration of intravenous contrast following angiogram protocol.  Additionally, 3D and multiplanar reformatted images were provided.        FINDINGS:     Pulmonary arteries: There is adequate enhancement of the pulmonary  arteries to evaluate for central and segmental pulmonary emboli. There  are no filling defects within the main, lobar, segmental or visualized  subsegmental pulmonary arteries.    .      Aorta and great vessels: The aorta is well opacified and demonstrates no  dissection or aneurysm. The great vessels are normal in appearance.     Visualized neck base: The imaged portion of the base of the neck appears  unremarkable.      Lungs: The lungs are clear. There is no mass, worrisome nodule, or  consolidation.     Small bilateral pleural effusions are present.. The trachea and  bronchial tree are patent.      Heart: The heart is normal in size. There is no pericardial effusion.      Mediastinum and lymph nodes: No enlarged mediastinal, hilar, or axillary  lymph nodes are present.      Skeletal and soft tissues: The osseous structures of the thorax and  surrounding soft tissues demonstrate no acute process.     Upper abdomen: Nonobstructing renal calculi are present bilaterally..        Impression:       1. No evidence of embolic disease.  2. Small bilateral posterior pleural effusions  3. Bilateral nonobstructing renal calculi.        This report was finalized on 02/12/2018 18:18 by Dr. Derek Major MD.    CT Head Without Contrast [[id number]] Collected:  02/12/18 2204     Updated:  02/12/18 2208    Narrative:       CT BRAIN without contrast dated 2/12/2018 9:45 PM CST     HISTORY: Fell and hit for head     COMPARISON: None      DOSE LENGTH PRODUCT: 533 mGy cm     In order to have a CT radiation dose as low as reasonably achievable,  Automated Exposure Control was utilized for adjustment of the mA and/or  KV according to patient size.     TECHNIQUE: Serial axial tomographic images of the brain were obtained  without the use of intravenous contrast.      FINDINGS:   The midline structures are nondisplaced. The ventricles and basilar  cisterns are normal  in size and configuration. There is no evidence of  intracranial hemorrhage or mass-effect. The gray-white matter  differentiation is maintained. Faint calcifications noted in the basal  ganglia region. The sulci have a normal configuration, and there are no  abnormal extra-axial fluid collections. The structures of the posterior  fossa are unremarkable.      The included orbits and their contents are unremarkable. The visualized  paranasal sinuses, mastoid air cells and middle ear cavities are clear.  The visualized osseous structures and overlying soft tissues of the  skull and face are unremarkable.        Impression:       1. No acute intracranial process.        This report was finalized on 02/12/2018 22:04 by Dr. Derek Major MD.    FL Upper GI With Air Contrast [[id number]] Collected:  02/13/18 1439     Updated:  02/13/18 1444    Narrative:       HISTORY: Noncardiac chest pain with nausea     Upper GI exam: 28 spot views of the upper gastric intestinal system are  performed during an upper GI exam. Fluoroscopy time 1.9 minutes     Double contrast assessment of the upper gastric intestinal system is  performed.     There is normal esophageal peristalsis. No esophageal mucosal mass or  ulcer is identified. There is no hiatal hernia. There is mild  intermittent gastroesophageal reflux.     No discrete gastric mucosal mass or ulcer is identified. There is normal  gastric emptying. Images of the duodenum are unremarkable.       Impression:       1. Mild intermittent gastroesophageal reflux. No mucosal mass or  ulceration. No hiatal hernia.  This report was finalized on 02/13/2018 14:41 by Dr. Gladys Gonzalez MD.        Lab Results (last 7 days)     Procedure Component Value Units Date/Time    CBC & Differential [[id number]] Collected:  02/12/18 1623    Specimen:  Blood Updated:  02/12/18 1633    Narrative:       The following orders were created for panel order CBC & Differential.  Procedure                                Abnormality         Status                     ---------                               -----------         ------                     CBC Auto Differential[[id number]]        Abnormal            Final result                 Please view results for these tests on the individual orders.    CBC Auto Differential [[id number]]  (Abnormal) Collected:  02/12/18 1623    Specimen:  Blood Updated:  02/12/18 1633     WBC 6.38 10*3/mm3      RBC 4.09 (L) 10*6/mm3      Hemoglobin 12.8 g/dL      Hematocrit 38.5 %      MCV 94.1 fL      MCH 31.3 pg      MCHC 33.2 g/dL      RDW 12.5 %      RDW-SD 43.7 fl      MPV 9.2 fL      Platelets 325 10*3/mm3      Neutrophil % 60.7 %      Lymphocyte % 22.3 %      Monocyte % 10.8 %      Eosinophil % 5.5 (H) %      Basophil % 0.2 %      Immature Grans % 0.5 %      Neutrophils, Absolute 3.88 10*3/mm3      Lymphocytes, Absolute 1.42 10*3/mm3      Monocytes, Absolute 0.69 10*3/mm3      Eosinophils, Absolute 0.35 10*3/mm3      Basophils, Absolute 0.01 10*3/mm3      Immature Grans, Absolute 0.03 10*3/mm3      nRBC 0.0 /100 WBC     Comprehensive Metabolic Panel [[id number]] Collected:  02/12/18 1623    Specimen:  Blood Updated:  02/12/18 1642     Glucose 92 mg/dL      BUN 16 mg/dL      Creatinine 0.82 mg/dL      Sodium 140 mmol/L      Potassium 4.4 mmol/L      Chloride 98 mmol/L      CO2 30.0 mmol/L      Calcium 9.4 mg/dL      Total Protein 6.8 g/dL      Albumin 4.10 g/dL      ALT (SGPT) 40 U/L      AST (SGOT) 41 U/L      Alkaline Phosphatase 65 U/L      Total Bilirubin 0.3 mg/dL      eGFR Non African Amer 72 mL/min/1.73      Globulin 2.7 gm/dL      A/G Ratio 1.5 g/dL      BUN/Creatinine Ratio 19.5     Anion Gap 12.0 mmol/L     Amylase [[id number]]  (Normal) Collected:  02/12/18 1623    Specimen:  Blood Updated:  02/12/18 1643     Amylase 65 U/L     Lipase [[id number]]  (Normal) Collected:  02/12/18 1623    Specimen:  Blood Updated:  02/12/18 1643     Lipase 106 U/L     D-dimer, Quantitative  [[id number]]  (Abnormal) Collected:  02/12/18 1623    Specimen:  Blood Updated:  02/12/18 1650     D-Dimer, Quantitative 0.71 (H) mg/L (FEU)     Narrative:       Reference Range is 0-0.50 mg/L FEU. However, results <0.50 mg/L FEU tends to rule out DVT or PE. Results >0.50 mg/L FEU are not useful in predicting absence or presence of DVT or PE.    Troponin [[id number]]  (Normal) Collected:  02/12/18 1623    Specimen:  Blood Updated:  02/12/18 1654     Troponin I <0.012 ng/mL     [place] Draw [[id number]] Collected:  02/12/18 1623    Specimen:  Blood Updated:  02/12/18 1731    Narrative:       The following orders were created for panel order [place] Draw.  Procedure                               Abnormality         Status                     ---------                               -----------         ------                     Light Blue Top[[id number]]                                   Final result               Green Top (Gel)[[id number]]                                  Final result               Lavender Top[[id number]]                                     Final result               Red Top[[id number]]                                          Final result                 Please view results for these tests on the individual orders.    Light Blue Top [[id number]] Collected:  02/12/18 1623    Specimen:  Blood Updated:  02/12/18 1731     Extra Tube hold for add-on      Auto resulted       Green Top (Gel) [[id number]] Collected:  02/12/18 1623    Specimen:  Blood Updated:  02/12/18 1731     Extra Tube Hold for add-ons.      Auto resulted.       Lavender Top [[id number]] Collected:  02/12/18 1623    Specimen:  Blood Updated:  02/12/18 1731     Extra Tube hold for add-on      Auto resulted       Red Top [[id number]] Collected:  02/12/18 1623    Specimen:  Blood Updated:  02/12/18 1731     Extra Tube Hold for add-ons.      Auto resulted.       Troponin [[id number]]  (Normal) Collected:  02/12/18 1904    Specimen:  Blood Updated:   02/12/18 1933     Troponin I <0.012 ng/mL     POC Glucose Once [[id number]]  (Normal) Collected:  02/12/18 2234    Specimen:  Blood Updated:  02/12/18 2246     Glucose 82 mg/dL       : [id number] DeBoe AutumnMeter ID: [id number]       CBC Auto Differential [[id number]]  (Abnormal) Collected:  02/13/18 0659    Specimen:  Blood Updated:  02/13/18 0722     WBC 4.72 (L) 10*3/mm3      RBC 3.78 (L) 10*6/mm3      Hemoglobin 11.9 (L) g/dL      Hematocrit 35.1 (L) %      MCV 92.9 fL      MCH 31.5 pg      MCHC 33.9 g/dL      RDW 12.7 %      RDW-SD 43.2 fl      MPV 9.5 fL      Platelets 280 10*3/mm3      Neutrophil % 60.8 %      Lymphocyte % 17.2 %      Monocyte % 15.3 (H) %      Eosinophil % 5.7 (H) %      Basophil % 0.4 %      Immature Grans % 0.6 %      Neutrophils, Absolute 2.87 10*3/mm3      Lymphocytes, Absolute 0.81 10*3/mm3      Monocytes, Absolute 0.72 10*3/mm3      Eosinophils, Absolute 0.27 10*3/mm3      Basophils, Absolute 0.02 10*3/mm3      Immature Grans, Absolute 0.03 10*3/mm3      nRBC 0.0 /100 WBC     Comprehensive Metabolic Panel [[id number]]  (Abnormal) Collected:  02/13/18 0659    Specimen:  Blood Updated:  02/13/18 0746     Glucose 83 mg/dL      BUN 14 mg/dL      Creatinine 0.82 mg/dL      Sodium 138 mmol/L      Potassium 4.3 mmol/L      Chloride 97 (L) mmol/L      CO2 32.0 (H) mmol/L      Calcium 9.1 mg/dL      Total Protein 6.3 g/dL      Albumin 3.60 g/dL      ALT (SGPT) 42 U/L      AST (SGOT) 50 (H) U/L      Alkaline Phosphatase 60 U/L      Total Bilirubin 0.3 mg/dL      eGFR Non African Amer 72 mL/min/1.73      Globulin 2.7 gm/dL      A/G Ratio 1.3 g/dL      BUN/Creatinine Ratio 17.1     Anion Gap 9.0 mmol/L     Magnesium [[id number]]  (Normal) Collected:  02/13/18 0659    Specimen:  Blood Updated:  02/13/18 0746     Magnesium 2.0 mg/dL     Phosphorus [[id number]]  (Abnormal) Collected:  02/13/18 0659    Specimen:  Blood Updated:  02/13/18 0746     Phosphorus 4.9 (H) mg/dL     Troponin [[id number]]   (Normal) Collected:  02/13/18 0659    Specimen:  Blood Updated:  02/13/18 0753     Troponin I <0.012 ng/mL     POC Glucose Once [[id number]]  (Normal) Collected:  02/13/18 0817    Specimen:  Blood Updated:  02/13/18 0856     Glucose 85 mg/dL       : [id number] Sam EuraisaMeter ID: [id number]       POC Glucose Once [[id number]]  (Normal) Collected:  02/13/18 1132    Specimen:  Blood Updated:  02/13/18 1159     Glucose 90 mg/dL       : [id number] Sam EuraisaMeter ID: [id number]       Troponin [[id number]]  (Abnormal) Collected:  02/13/18 1317    Specimen:  Blood Updated:  02/13/18 1408     Troponin I 0.098 (H) ng/mL     POC Glucose Once [[id number]]  (Normal) Collected:  02/13/18 1549    Troponin [[id number]]  (Abnormal) Collected:  02/13/18 1947    Specimen:  Blood Updated:  02/13/18 2034     Troponin I 0.038 (H) ng/mL     CBC (No Diff) [[id number]]  (Abnormal) Collected:  02/14/18 0537    Specimen:  Blood Updated:  02/14/18 0603     WBC 5.25 10*3/mm3      RBC 3.63 (L) 10*6/mm3      Hemoglobin 11.4 (L) g/dL      Hematocrit 33.7 (L) %      MCV 92.8 fL      MCH 31.4 pg      MCHC 33.8 g/dL      RDW 12.7 %      RDW-SD 43.3 fl      MPV 9.6 fL      Platelets 244 10*3/mm3     Comprehensive Metabolic Panel [[id number]]  (Abnormal) Collected:  02/14/18 0537    Specimen:  Blood Updated:  02/14/18 0611     Glucose 78 mg/dL      BUN 12 mg/dL      Creatinine 0.80 mg/dL      Sodium 138 mmol/L      Potassium 4.1 mmol/L      Chloride 98 mmol/L      CO2 33.0 (H) mmol/L      Calcium 8.7 mg/dL      Total Protein 6.1 (L) g/dL      Albumin 3.60 g/dL      ALT (SGPT) 42 U/L      AST (SGOT) 42 U/L      Alkaline Phosphatase 57 U/L      Total Bilirubin 0.5 mg/dL      eGFR Non African Amer 74 mL/min/1.73      Globulin 2.5 gm/dL      A/G Ratio 1.4 g/dL      BUN/Creatinine Ratio 15.0     Anion Gap 7.0 mmol/L     Lipase [[id number]]  (Normal) Collected:  02/14/18 0537    Specimen:  Blood Updated:  02/14/18 0611     Lipase 60 U/L     Amylase  [[id number]]  (Normal) Collected:  02/14/18 0537    Specimen:  Blood Updated:  02/14/18 0611     Amylase 56 U/L     POC Glucose Once [[id number]]  (Normal) Collected:  02/14/18 0714    Specimen:  Blood Updated:  02/14/18 0725     Glucose 91 mg/dL       : [id number] Blanquita KaleeMeter ID: [id number]       Beta Strep Culture, Throat - Swab, Throat [[id number]] Collected:  02/14/18 1117    Specimen:  Swab from Throat Updated:  02/14/18 1131    Rapid Strep A Screen - Swab, Throat [[id number]]  (Normal) Collected:  02/14/18 1117    Specimen:  Swab from Throat Updated:  02/14/18 1131     Strep A Ag Negative    Influenza Antigen, Rapid - Swab, Nasopharynx [[id number]]  (Normal) Collected:  02/14/18 1118    Specimen:  Swab from Nasopharynx Updated:  02/14/18 1137     Influenza A Ag, EIA Negative     Influenza B Ag, EIA Negative    Narrative:         Recommend confirmation of negative results by viral culture or molecular assay.    POC Glucose Once [[id number]]  (Normal) Collected:  02/14/18 1155    Specimen:  Blood Updated:  02/14/18 1219     Glucose 101 mg/dL       : [id number] Sam EuraisaMeter ID: [id number]           Hospital Course:  The patient is a 57 y.o. female who Follows with Dr. richmond for her primary care.  She is a past medical history significant for multiple sclerosis and chronic pain syndrome.    On 2/12 with complaints of chest pain.  Over the chest will have sharp chest pains in the middle of the chest radiating to the left.  Otherwise she had a pressure sensation in the chest.  It was exacerbated by activity but not necessarily relieved with exertion.  She had presented to the emergency department on's 2/6 for the exact same complaints.  At that time she was offered admission and/or to continue her emergency room stay and she opted to go home.  She did have an elevated d-dimer upon admission and CTA was negative pulmonary emboli.  She was admitted to the hospital service further evaluation  "management.    Patient had recurrent troponins which were negative.  He went to be having stress echocardiogram which was interpreted as low risk of ischemia.  Last evening,  patient continued to have uncontrollable nausea and vomiting and retching.  This was associated with chest pain as well.  We continue to keep her overnight and give her antibiotics.  It checked an upper GI which reveals gastroesophageal reflux disease which was mild.  CT of the head was unremarkable as well.  Today, she is complaining of sore throat inability to swallow correctly.  And she had does have oral candidiasis.  Suspected that she has oral candidiasis that his traveled and become esophageal candidiasis contributing to her chest pain.  Influenza and strep swabs were both negative.  She has now tolerated a diet.  She is deemed stable for discharge close follow-up with Dr. richmond.  I would like for her to take at least 30 days of her proton pump inhibitor.  I have treated her candidiasis with nystatin post discharge.  If she continues to have chest pain to suggest an upper endoscopy as she is headed negative amylase and lipase and his prior cholecystectomy.    Physical Exam on Discharge:  /46 (BP Location: Left arm, Patient Position: Lying)  Pulse 96  Temp 98.2 °F (36.8 °C) (Temporal Artery )   Resp 18  Ht 162.6 cm (64.02\")  Wt 70.7 kg (155 lb 14.4 oz)  SpO2 94%  BMI 26.75 kg/m2  Physical Exam   Constitutional: She is oriented to person, place, and time. She appears well-developed and well-nourished. She appears distressed.   HENT:   Head: Normocephalic and atraumatic.   Oral candidiasis noted   Eyes: Conjunctivae and EOM are normal. Pupils are equal, round, and reactive to light.   Neck: Neck supple. No JVD present. No thyromegaly present.   Cardiovascular: Normal rate, regular rhythm, normal heart sounds and intact distal pulses.  Exam reveals no gallop and no friction rub.    No murmur heard.  Pulmonary/Chest: Effort normal " and breath sounds normal. No respiratory distress. She has no wheezes. She has no rales. She exhibits no tenderness.   Abdominal: Soft. Bowel sounds are normal. She exhibits no distension. There is no tenderness. There is no rebound and no guarding.   Musculoskeletal: Normal range of motion. She exhibits no edema, tenderness or deformity.   Lymphadenopathy:     She has no cervical adenopathy.   Neurological: She is alert and oriented to person, place, and time. She displays normal reflexes. No cranial nerve deficit. She exhibits normal muscle tone.   Skin: Skin is warm and dry. No rash noted.   Psychiatric: She has a normal mood and affect. Her behavior is normal. Judgment and thought content normal.     Condition on Discharge: stable    Discharge Disposition:  Home or Self Care    Discharge Medications:   Mary Medley   Home Medication Instructions [place]:[id number]    Printed on:02/14/18 8888   Medication Information                      acetaminophen (TYLENOL) 325 MG tablet  Take 300 mg by mouth Every 8 (Eight) Hours As Needed for Mild Pain .             estradiol (ESTRACE) 2 MG tablet  Take 2 mg by mouth Daily.             fingolimod (GILENYA) 0.5 MG capsule  Take 0.5 mg by mouth Daily.             gabapentin (NEURONTIN) 800 MG tablet  Take 900 mg by mouth 3 (Three) Times a Day.             HYDROcodone-acetaminophen (NORCO) 5-325 MG per tablet  Take 1 tablet by mouth Every 8 (Eight) Hours As Needed.             nystatin (MYCOSTATIN) 596673 UNIT/ML suspension  Take 5 mL by mouth 4 (Four) Times a Day.             pantoprazole (PROTONIX) 40 MG EC tablet  Take 1 tablet by mouth Daily.             promethazine (PHENERGAN) 12.5 MG tablet  Take 1 tablet by mouth Every 6 (Six) Hours As Needed for Nausea or Vomiting.             traZODone (DESYREL) 150 MG tablet  Take 300 mg by mouth Every Night.               Discharge Diet:   Diet Instructions     Diet: Regular; Thin       Discharge Diet:  Regular   Fluid  Consistency:  Thin               Activity at Discharge:   Activity Instructions     Activity as Tolerated                     Follow-up Appointments:   1. 1 week with Dr. Starks    Test Results Pending at Discharge:   Beta Hemolytic strep.     SURESH Jacinto  02/14/18  2:26 PM    Time: 25 minutes.   I personally evaluated and examined the patient in conjunction with Akiko PRINCE and agree with the assessment, treatment plan, and disposition of the patient as recorded by her. My history, exam, and further recommendations are:     Patient seen.  She has multiplicity of complaints which I believe had been addressed.  She had stress test, UGI study, head CT and labs which I personally reviewed.  I think she is medically stable and appropriate for discharge.  She is strongly advised to follow up with her PPC   Vitals:    02/14/18 1152   BP: 127/46   Pulse: 96   Resp: 18   Temp: 98.2 °F (36.8 °C)   SpO2: 94%     Discussed with Akiko Abreu MD  02/14/18  3:00 PM

## 2018-02-14 NOTE — PLAN OF CARE
Problem: Patient Care Overview (Adult)  Goal: Plan of Care Review  Outcome: Ongoing (interventions implemented as appropriate)   02/14/18 0444   Coping/Psychosocial Response Interventions   Plan Of Care Reviewed With patient   Patient Care Overview   Progress no change   Outcome Evaluation   Outcome Summary/Follow up Plan VSS. C/o constant pain this shift, medicated with PRN Dilaudid. C/o constant nausea with dry heaving, medicated with PRN Phenergan. IVfF infusing. Safety maintained. Will cont to monitor.       Problem: Pain, Chronic (Adult)  Goal: Identify Related Risk Factors and Signs and Symptoms  Outcome: Ongoing (interventions implemented as appropriate)    Goal: Acceptable Pain Control/Comfort Level  Outcome: Ongoing (interventions implemented as appropriate)      Problem: Fall Risk (Adult)  Goal: Identify Related Risk Factors and Signs and Symptoms  Outcome: Ongoing (interventions implemented as appropriate)    Goal: Absence of Falls  Outcome: Ongoing (interventions implemented as appropriate)      Problem: Acute Coronary Syndrome (ACS) (Adult)  Goal: Signs and Symptoms of Listed Potential Problems Will be Absent or Manageable (Acute Coronary Syndrome)  Outcome: Ongoing (interventions implemented as appropriate)

## 2018-02-16 LAB — BACTERIA SPEC AEROBE CULT: NORMAL

## 2018-02-16 RX ORDER — HYDROCODONE BITARTRATE AND ACETAMINOPHEN 5; 325 MG/1; MG/1
1 TABLET ORAL 3 TIMES DAILY PRN
Qty: 90 TABLET | Refills: 0 | Status: SHIPPED | OUTPATIENT
Start: 2018-02-19 | End: 2018-03-20 | Stop reason: SDUPTHER

## 2018-03-06 ENCOUNTER — TRANSCRIBE ORDERS (OUTPATIENT)
Dept: ADMINISTRATIVE | Facility: HOSPITAL | Age: 58
End: 2018-03-06

## 2018-03-06 DIAGNOSIS — Z12.31 ENCOUNTER FOR SCREENING MAMMOGRAM FOR MALIGNANT NEOPLASM OF BREAST: Primary | ICD-10-CM

## 2018-03-21 RX ORDER — HYDROCODONE BITARTRATE AND ACETAMINOPHEN 5; 325 MG/1; MG/1
1 TABLET ORAL 3 TIMES DAILY PRN
Qty: 45 TABLET | Refills: 0 | Status: SHIPPED | OUTPATIENT
Start: 2018-03-21 | End: 2018-04-05 | Stop reason: SDUPTHER

## 2018-04-05 ENCOUNTER — HOSPITAL ENCOUNTER (OUTPATIENT)
Dept: PAIN MANAGEMENT | Age: 58
Discharge: HOME | End: 2018-04-05
Payer: COMMERCIAL

## 2018-04-05 VITALS
OXYGEN SATURATION: 98 % | TEMPERATURE: 97.4 F | DIASTOLIC BLOOD PRESSURE: 84 MMHG | SYSTOLIC BLOOD PRESSURE: 164 MMHG | HEIGHT: 64 IN | WEIGHT: 160 LBS | RESPIRATION RATE: 18 BRPM | HEART RATE: 87 BPM | BODY MASS INDEX: 27.31 KG/M2

## 2018-04-05 DIAGNOSIS — G35 MULTIPLE SCLEROSIS (HCC): ICD-10-CM

## 2018-04-05 PROCEDURE — 99212 OFFICE O/P EST SF 10 MIN: CPT

## 2018-04-05 RX ORDER — HYDROCODONE BITARTRATE AND ACETAMINOPHEN 5; 325 MG/1; MG/1
1 TABLET ORAL 3 TIMES DAILY PRN
Qty: 90 TABLET | Refills: 0 | Status: SHIPPED | OUTPATIENT
Start: 2018-04-05 | End: 2018-05-03 | Stop reason: SDUPTHER

## 2018-04-05 ASSESSMENT — PAIN SCALES - GENERAL: PAINLEVEL_OUTOF10: 3

## 2018-04-05 ASSESSMENT — PAIN DESCRIPTION - LOCATION: LOCATION: BACK;LEG

## 2018-04-05 ASSESSMENT — PAIN DESCRIPTION - FREQUENCY: FREQUENCY: CONTINUOUS

## 2018-04-05 ASSESSMENT — PAIN DESCRIPTION - ONSET: ONSET: ON-GOING

## 2018-04-05 ASSESSMENT — PAIN DESCRIPTION - ORIENTATION: ORIENTATION: LOWER;RIGHT;LEFT

## 2018-04-05 ASSESSMENT — PAIN DESCRIPTION - PROGRESSION: CLINICAL_PROGRESSION: NOT CHANGED

## 2018-04-05 ASSESSMENT — PAIN DESCRIPTION - DIRECTION: RADIATING_TOWARDS: INTO BILATERAL LEGS

## 2018-04-05 ASSESSMENT — PAIN DESCRIPTION - DESCRIPTORS: DESCRIPTORS: ACHING;CONSTANT

## 2018-04-05 ASSESSMENT — PAIN DESCRIPTION - PAIN TYPE: TYPE: CHRONIC PAIN

## 2018-04-05 ASSESSMENT — ACTIVITIES OF DAILY LIVING (ADL): EFFECT OF PAIN ON DAILY ACTIVITIES: LIMITS ACTIVITY

## 2018-04-09 ENCOUNTER — HOSPITAL ENCOUNTER (OUTPATIENT)
Dept: MAMMOGRAPHY | Facility: HOSPITAL | Age: 58
Discharge: HOME | End: 2018-04-09
Attending: FAMILY MEDICINE | Admitting: FAMILY MEDICINE

## 2018-04-09 DIAGNOSIS — Z12.31 ENCOUNTER FOR SCREENING MAMMOGRAM FOR MALIGNANT NEOPLASM OF BREAST: ICD-10-CM

## 2018-04-09 PROCEDURE — 77063 BREAST TOMOSYNTHESIS BI: CPT

## 2018-04-09 PROCEDURE — 77067 SCR MAMMO BI INCL CAD: CPT

## 2018-04-12 ENCOUNTER — TRANSCRIBE ORDERS (OUTPATIENT)
Dept: ADMINISTRATIVE | Facility: HOSPITAL | Age: 58
End: 2018-04-12

## 2018-04-12 ENCOUNTER — LAB (OUTPATIENT)
Dept: LAB | Facility: HOSPITAL | Age: 58
End: 2018-04-12
Attending: FAMILY MEDICINE

## 2018-04-12 DIAGNOSIS — E78.00 PURE HYPERCHOLESTEROLEMIA: ICD-10-CM

## 2018-04-12 DIAGNOSIS — E03.9 HYPOTHYROIDISM, UNSPECIFIED TYPE: ICD-10-CM

## 2018-04-12 DIAGNOSIS — E78.00 PURE HYPERCHOLESTEROLEMIA: Primary | ICD-10-CM

## 2018-04-12 LAB
ALBUMIN SERPL-MCNC: 4.4 G/DL (ref 3.5–5)
ALBUMIN/GLOB SERPL: 1.3 G/DL (ref 1.1–2.5)
ALP SERPL-CCNC: 83 U/L (ref 24–120)
ALT SERPL W P-5'-P-CCNC: 40 U/L (ref 0–54)
ANION GAP SERPL CALCULATED.3IONS-SCNC: 7 MMOL/L (ref 4–13)
AST SERPL-CCNC: 38 U/L (ref 7–45)
AUTO MIXED CELLS #: 1 10*3/MM3 (ref 0.1–2.6)
AUTO MIXED CELLS %: 17.8 % (ref 0.1–24)
BILIRUB SERPL-MCNC: 0.4 MG/DL (ref 0.1–1)
BUN BLD-MCNC: 19 MG/DL (ref 5–21)
BUN/CREAT SERPL: 24.1
CALCIUM SPEC-SCNC: 9.8 MG/DL (ref 8.4–10.4)
CHLORIDE SERPL-SCNC: 104 MMOL/L (ref 98–110)
CHOLEST SERPL-MCNC: 260 MG/DL (ref 130–200)
CO2 SERPL-SCNC: 29 MMOL/L (ref 24–31)
CREAT BLD-MCNC: 0.79 MG/DL (ref 0.5–1.4)
ERYTHROCYTE [DISTWIDTH] IN BLOOD BY AUTOMATED COUNT: 12.7 % (ref 12–15)
GFR SERPL CREATININE-BSD FRML MDRD: 75 ML/MIN/1.73
GLOBULIN UR ELPH-MCNC: 3.3 GM/DL
GLUCOSE BLD-MCNC: 92 MG/DL (ref 70–100)
HCT VFR BLD AUTO: 42.5 % (ref 37–47)
HDLC SERPL-MCNC: 85 MG/DL
HGB BLD-MCNC: 14.4 G/DL (ref 12–16)
LDLC SERPL CALC-MCNC: 134 MG/DL (ref 0–99)
LDLC/HDLC SERPL: 1.58 {RATIO}
LYMPHOCYTES # BLD AUTO: 1.6 10*3/MM3 (ref 0.8–7)
LYMPHOCYTES NFR BLD AUTO: 28 % (ref 15–45)
MCH RBC QN AUTO: 32.6 PG (ref 28–32)
MCHC RBC AUTO-ENTMCNC: 33.9 G/DL (ref 33–36)
MCV RBC AUTO: 96.2 FL (ref 82–98)
NEUTROPHILS # BLD AUTO: 3.1 10*3/MM3 (ref 1.5–8.3)
NEUTROPHILS NFR BLD AUTO: 54.2 % (ref 39–78)
PLATELET # BLD AUTO: 255 10*3/MM3 (ref 130–400)
PMV BLD AUTO: 8.9 FL (ref 6–12)
POTASSIUM BLD-SCNC: 4.3 MMOL/L (ref 3.5–5.3)
PROT SERPL-MCNC: 7.7 G/DL (ref 6.3–8.7)
RBC # BLD AUTO: 4.42 10*6/MM3 (ref 4.2–5.4)
SODIUM BLD-SCNC: 140 MMOL/L (ref 135–145)
TRIGL SERPL-MCNC: 205 MG/DL (ref 0–149)
TSH SERPL DL<=0.05 MIU/L-ACNC: 0.5 MIU/ML (ref 0.47–4.68)
VLDLC SERPL-MCNC: 41 MG/DL
WBC NRBC COR # BLD: 5.7 10*3/MM3 (ref 4.8–10.8)

## 2018-04-12 PROCEDURE — 36415 COLL VENOUS BLD VENIPUNCTURE: CPT

## 2018-04-12 PROCEDURE — 80061 LIPID PANEL: CPT

## 2018-04-12 PROCEDURE — 80053 COMPREHEN METABOLIC PANEL: CPT

## 2018-04-12 PROCEDURE — 85025 COMPLETE CBC W/AUTO DIFF WBC: CPT

## 2018-04-12 PROCEDURE — 84443 ASSAY THYROID STIM HORMONE: CPT | Performed by: FAMILY MEDICINE

## 2018-05-03 RX ORDER — HYDROCODONE BITARTRATE AND ACETAMINOPHEN 5; 325 MG/1; MG/1
1 TABLET ORAL 3 TIMES DAILY PRN
Qty: 90 TABLET | Refills: 0 | Status: SHIPPED | OUTPATIENT
Start: 2018-05-05 | End: 2018-06-04 | Stop reason: SDUPTHER

## 2018-06-04 DIAGNOSIS — M51.36 DDD (DEGENERATIVE DISC DISEASE), LUMBAR: Primary | ICD-10-CM

## 2018-06-05 RX ORDER — HYDROCODONE BITARTRATE AND ACETAMINOPHEN 5; 325 MG/1; MG/1
1 TABLET ORAL 3 TIMES DAILY PRN
Qty: 90 TABLET | Refills: 0 | Status: SHIPPED | OUTPATIENT
Start: 2018-06-05 | End: 2018-06-28 | Stop reason: SDUPTHER

## 2018-06-28 DIAGNOSIS — M51.36 DDD (DEGENERATIVE DISC DISEASE), LUMBAR: ICD-10-CM

## 2018-06-28 RX ORDER — HYDROCODONE BITARTRATE AND ACETAMINOPHEN 5; 325 MG/1; MG/1
1 TABLET ORAL 3 TIMES DAILY PRN
Qty: 90 TABLET | Refills: 0 | Status: SHIPPED | OUTPATIENT
Start: 2018-07-05 | End: 2019-04-19

## 2018-10-10 ENCOUNTER — OFFICE VISIT (OUTPATIENT)
Dept: URGENT CARE | Age: 58
End: 2018-10-10
Payer: COMMERCIAL

## 2018-10-10 ENCOUNTER — HOSPITAL ENCOUNTER (OUTPATIENT)
Dept: GENERAL RADIOLOGY | Age: 58
Discharge: HOME | End: 2018-10-10
Payer: COMMERCIAL

## 2018-10-10 VITALS
HEART RATE: 78 BPM | RESPIRATION RATE: 22 BRPM | HEIGHT: 64 IN | OXYGEN SATURATION: 98 % | WEIGHT: 154.8 LBS | BODY MASS INDEX: 26.43 KG/M2 | TEMPERATURE: 97.6 F | SYSTOLIC BLOOD PRESSURE: 162 MMHG | DIASTOLIC BLOOD PRESSURE: 80 MMHG

## 2018-10-10 DIAGNOSIS — S69.91XA INJURY OF RIGHT HAND, INITIAL ENCOUNTER: Primary | ICD-10-CM

## 2018-10-10 DIAGNOSIS — S69.91XA INJURY OF RIGHT HAND, INITIAL ENCOUNTER: ICD-10-CM

## 2018-10-10 PROCEDURE — 3017F COLORECTAL CA SCREEN DOC REV: CPT | Performed by: NURSE PRACTITIONER

## 2018-10-10 PROCEDURE — G8419 CALC BMI OUT NRM PARAM NOF/U: HCPCS | Performed by: NURSE PRACTITIONER

## 2018-10-10 PROCEDURE — 73130 X-RAY EXAM OF HAND: CPT

## 2018-10-10 PROCEDURE — 99213 OFFICE O/P EST LOW 20 MIN: CPT | Performed by: NURSE PRACTITIONER

## 2018-10-10 PROCEDURE — G8484 FLU IMMUNIZE NO ADMIN: HCPCS | Performed by: NURSE PRACTITIONER

## 2018-10-10 PROCEDURE — 1036F TOBACCO NON-USER: CPT | Performed by: NURSE PRACTITIONER

## 2018-10-10 PROCEDURE — G8427 DOCREV CUR MEDS BY ELIG CLIN: HCPCS | Performed by: NURSE PRACTITIONER

## 2018-10-10 ASSESSMENT — ENCOUNTER SYMPTOMS
BACK PAIN: 0
RESPIRATORY NEGATIVE: 1

## 2018-10-10 NOTE — PROGRESS NOTES
[address] [facility]  [address] [place] [facility] [place] [address]  Dept: [phone]  Loc: [phone]    Lyric Ivan is a 66 y.o. female who presents today for her medical conditions/complaintsas noted below. Lyric Ivan is c/o of Finger Injury (Smashed fingers in a cart. Right hand middle and ring fingers were injured. )        HPI:     HPI  Pt presents to clinic with c/o left hand injury. States fingers smashed on cart just now. States some bleeding present. States pain and throbbing present. Denies treatment for pain. Past Medical History:   Diagnosis Date    Arthritis     History of blood transfusion     Multiple sclerosis ([facility] [address]     Multiple sclerosis ([facility] [address] 2016    Thyroid disease       Past Surgical History:   Procedure Laterality Date    APPENDECTOMY       SECTION  x2    CHOLECYSTECTOMY, OPEN      HYSTERECTOMY      x2    JOINT REPLACEMENT      left knee       History reviewed. No pertinent family history. Social History   Substance Use Topics    Smoking status: Former Smoker    Smokeless tobacco: Never Used    Alcohol use No      Comment: ocassional      Current Outpatient Prescriptions   Medication Sig Dispense Refill    HYDROcodone-acetaminophen (NORCO) 5-325 MG per tablet Take 1 tablet by mouth 3 times daily as needed for Pain for up to 30 days. Pearle Milling Date: 18 90 tablet 0    Fingolimod HCl (GILENYA) 0.5 MG CAPS Take 0.5 mg by mouth daily      gabapentin (NEURONTIN) 600 MG tablet Take 900 mg by mouth 3 times daily       traZODone (DESYREL) 150 MG tablet Take 150 mg by mouth nightly Indications: 2 tabs      estradiol (ESTRACE) 2 MG tablet Take 2 mg by mouth daily      levothyroxine (SYNTHROID) 125 MCG tablet Take 125 mcg by mouth Daily      diazepam (VALIUM) 5 MG tablet Take 5 mg by mouth every 6 hours as needed for Anxiety       No current facility-administered medications for this visit.

## 2019-03-08 ENCOUNTER — TRANSCRIBE ORDERS (OUTPATIENT)
Dept: ADMINISTRATIVE | Facility: HOSPITAL | Age: 59
End: 2019-03-08

## 2019-03-08 DIAGNOSIS — Z13.820 SCREENING FOR OSTEOPOROSIS: Primary | ICD-10-CM

## 2019-03-20 ENCOUNTER — HOSPITAL ENCOUNTER (OUTPATIENT)
Dept: BONE DENSITY | Facility: HOSPITAL | Age: 59
Discharge: HOME | End: 2019-03-20
Admitting: FAMILY MEDICINE

## 2019-03-20 DIAGNOSIS — Z13.820 SCREENING FOR OSTEOPOROSIS: ICD-10-CM

## 2019-03-20 PROCEDURE — 77080 DXA BONE DENSITY AXIAL: CPT

## 2019-03-28 ENCOUNTER — TRANSCRIBE ORDERS (OUTPATIENT)
Dept: ADMINISTRATIVE | Facility: HOSPITAL | Age: 59
End: 2019-03-28

## 2019-04-09 ENCOUNTER — TRANSCRIBE ORDERS (OUTPATIENT)
Dept: ADMINISTRATIVE | Facility: HOSPITAL | Age: 59
End: 2019-04-09

## 2019-04-17 ENCOUNTER — TRANSCRIBE ORDERS (OUTPATIENT)
Dept: ADMINISTRATIVE | Facility: HOSPITAL | Age: 59
End: 2019-04-17

## 2019-04-17 DIAGNOSIS — Z12.39 SCREENING BREAST EXAMINATION: Primary | ICD-10-CM

## 2019-04-17 DIAGNOSIS — G35 MULTIPLE SCLEROSIS (HCC): Primary | ICD-10-CM

## 2019-04-19 ENCOUNTER — OFFICE VISIT (OUTPATIENT)
Dept: URGENT CARE | Age: 59
End: 2019-04-19
Payer: COMMERCIAL

## 2019-04-19 ENCOUNTER — HOSPITAL ENCOUNTER (OUTPATIENT)
Dept: GENERAL RADIOLOGY | Age: 59
Discharge: HOME | End: 2019-04-19
Payer: COMMERCIAL

## 2019-04-19 VITALS
BODY MASS INDEX: 26.09 KG/M2 | TEMPERATURE: 98.2 F | SYSTOLIC BLOOD PRESSURE: 153 MMHG | HEART RATE: 74 BPM | RESPIRATION RATE: 18 BRPM | WEIGHT: 152 LBS | DIASTOLIC BLOOD PRESSURE: 76 MMHG | OXYGEN SATURATION: 97 %

## 2019-04-19 DIAGNOSIS — M25.422 SWELLING OF LEFT ELBOW: ICD-10-CM

## 2019-04-19 DIAGNOSIS — M25.522 LEFT ELBOW PAIN: Primary | ICD-10-CM

## 2019-04-19 DIAGNOSIS — M25.522 LEFT ELBOW PAIN: ICD-10-CM

## 2019-04-19 PROCEDURE — 1036F TOBACCO NON-USER: CPT | Performed by: SPECIALIST

## 2019-04-19 PROCEDURE — 3017F COLORECTAL CA SCREEN DOC REV: CPT | Performed by: SPECIALIST

## 2019-04-19 PROCEDURE — G8419 CALC BMI OUT NRM PARAM NOF/U: HCPCS | Performed by: SPECIALIST

## 2019-04-19 PROCEDURE — 99213 OFFICE O/P EST LOW 20 MIN: CPT | Performed by: SPECIALIST

## 2019-04-19 PROCEDURE — 73070 X-RAY EXAM OF ELBOW: CPT

## 2019-04-19 PROCEDURE — G8427 DOCREV CUR MEDS BY ELIG CLIN: HCPCS | Performed by: SPECIALIST

## 2019-04-19 RX ORDER — CLINDAMYCIN HYDROCHLORIDE 300 MG/1
300 CAPSULE ORAL 3 TIMES DAILY
Qty: 30 CAPSULE | Refills: 0 | Status: SHIPPED | OUTPATIENT
Start: 2019-04-19 | End: 2019-04-29

## 2019-04-19 NOTE — PROGRESS NOTES
[address] [facility]  [address] [facility] [place] [address]  Dept: [phone]  Loc: [phone]    [place] is a 64 y.o. female who presents today for her medical conditions/complaintsas noted below. [place] is c/o of Elbow Pain (left )        HPI:     Arm Pain    The incident occurred 2 days ago. The incident occurred at the gym. The injury mechanism is unknown. The pain is present in the left elbow. The quality of the pain is described as aching and shooting. The pain radiates to the left arm. The pain is moderate. The pain has been worsening since the incident. Associated symptoms include numbness (left elbow radiating to fingers) and tingling. The symptoms are aggravated by movement, palpation and lifting. She has tried immobilization for the symptoms. The treatment provided no relief. Past Medical History:   Diagnosis Date    Arthritis     History of blood transfusion     Multiple sclerosis ([facility] [address]     Multiple sclerosis ([facility] [address] 2016    Thyroid disease      Past Surgical History:   Procedure Laterality Date    APPENDECTOMY       SECTION  x2    CHOLECYSTECTOMY, OPEN      HYSTERECTOMY      x2    JOINT REPLACEMENT      left knee       History reviewed. No pertinent family history.     Social History     Tobacco Use    Smoking status: Former Smoker    Smokeless tobacco: Never Used   Substance Use Topics    Alcohol use: No     Comment: ocassional      Current Outpatient Medications   Medication Sig Dispense Refill    clindamycin (CLEOCIN) 300 MG capsule Take 1 capsule by mouth 3 times daily for 10 days 30 capsule 0    Fingolimod HCl (GILENYA) 0.5 MG CAPS Take 0.5 mg by mouth daily      gabapentin (NEURONTIN) 600 MG tablet Take 900 mg by mouth 3 times daily       traZODone (DESYREL) 150 MG tablet Take 150 mg by mouth nightly Indications: 2 tabs      estradiol (ESTRACE) 2 MG tablet Take 2 mg by mouth daily      levothyroxine (SYNTHROID) 125 MCG tablet Take 125 mcg by mouth Daily      diazepam (VALIUM) 5 MG tablet Take 5 mg by mouth every 6 hours as needed for Anxiety       No current facility-administered medications for this visit. Allergies   Allergen Reactions    Amoxicillin Rash    Ondansetron Hcl Itching    Ciprocinonide [Fluocinolone] Rash    Ciprofloxacin Rash    Penicillins Rash    Sulfa Antibiotics Rash    Thorazine [Chlorpromazine] Anxiety       Health Maintenance   Topic Date Due    Hepatitis C screen  1960    HIV screen  03/30/1977    DTaP/Tdap/Td vaccine (1 - Tdap) 03/30/1981    Cervical cancer screen  03/30/1983    Lipid screen  03/30/2002    Diabetes screen  03/30/2002    Breast cancer screen  03/30/2012    Shingles Vaccine (1 of 2) 03/30/2012    Colon cancer screen colonoscopy  03/30/2012    Flu vaccine (Season Ended) 09/01/2019    Pneumococcal 0-64 years Vaccine  Aged Out       Subjective:     Review of Systems   Musculoskeletal: Positive for arthralgias (left elbow) and joint swelling (left elbow). Neurological: Positive for tingling and numbness (left elbow radiating to fingers). OBJECTIVE:     Physical Exam   Constitutional: She appears well-developed and well-nourished. She is cooperative. HENT:   Head: Normocephalic and atraumatic. Musculoskeletal:        Left elbow: She exhibits decreased range of motion (due to pain) and swelling. Tenderness found. Radial head tenderness noted. Neurological: She is alert. Psychiatric: She has a normal mood and affect. Her speech is normal and behavior is normal.   Nursing note and vitals reviewed. BP (!) 153/76   Pulse 74   Temp 98.2 °F (36.8 °C)   Resp 18   Wt 152 lb (68.9 kg)   SpO2 97%   BMI 26.09 kg/m²     ASSESSMENT:       Diagnosis Orders   1. Left elbow pain  XR ELBOW LEFT (2 VIEWS)    SLING ARM ELEVATOR   2.  Swelling of left elbow  clindamycin (CLEOCIN) 300 MG capsule     No fracture or acute osseous findings. Moderate   degenerative changes of the elbow with no joint effusion. Subchondral   lucency within the capitellum is probably related to degenerative   cystic changes, though osteomyelitis is in the differential. If pain   persists consider follow-up with MRI of the left elbow. Signed by Dr Gregory Kelsey. Katarzyna Judiafshan on 4/19/2019 12:50 PM       PLAN:      Orders Placed This Encounter   Procedures    XR ELBOW LEFT (2 VIEWS)     Standing Status:   Future     Number of Occurrences:   1     Standing Expiration Date:   4/19/2020     Order Specific Question:   Reason for exam:     Answer:   left elbow pain    SLING ARM ELEVATOR     Patient was advised to proceed to [facility] tomorrow. She declines. She states, \"I owe them $200. I can't go there. \"  Advised her to see her PCP next week to obtain MRI of left elbow. She voices understanding. Return if symptoms worsen or fail to improve. Orders Placed This Encounter   Procedures    XR ELBOW LEFT (2 VIEWS)     Standing Status:   Future     Number of Occurrences:   1     Standing Expiration Date:   4/19/2020     Order Specific Question:   Reason for exam:     Answer:   left elbow pain    SLING ARM ELEVATOR     Orders Placed This Encounter   Medications    clindamycin (CLEOCIN) 300 MG capsule     Sig: Take 1 capsule by mouth 3 times daily for 10 days     Dispense:  30 capsule     Refill:  0       Patient given educationalmaterials - see patient instructions. Discussed use, benefit, and side effects of prescribed medications. All patient questions answered. Pt voiced understanding. Patient agreed with treatment plan. Follow up as directed. Patient Instructions       Patient Education        Joint Pain: Care Instructions  Your Care Instructions    Many people have small aches and pains from overuse or injury to muscles and joints. Joint injuries often happen during sports or recreation, work tasks, or projects around the home.  An overuse injury can happen when you put too much stress on a joint or when you do an activity that stresses the joint over and over, such as using the computer or rowing a boat. You can take action at home to help your muscles and joints get better. You should feel better in 1 to 2 weeks, but it can take 3 months or more to heal completely. Follow-up care is a key part of your treatment and safety. Be sure to make and go to all appointments, and call your doctor if you are having problems. It's also a good idea to know your test results and keep a list of the medicines you take. How can you care for yourself at home? · Do not put weight on the injured joint for at least a day or two. · For the first day or two after an injury, do not take hot showers or baths, and do not use hot packs. The heat could make swelling worse. · Put ice or a cold pack on the sore joint for 10 to 20 minutes at a time. Try to do this every 1 to 2 hours for the next 3 days (when you are awake) or until the swelling goes down. Put a thin cloth between the ice and your skin. · Wrap the injury in an elastic bandage. Do not wrap it too tightly because this can cause more swelling. · Prop up the sore joint on a pillow when you ice it or anytime you sit or lie down during the next 3 days. Try to keep it above the level of your heart. This will help reduce swelling. · Take an over-the-counter pain medicine, such as acetaminophen (Tylenol), ibuprofen (Advil, Motrin), or naproxen (Aleve). Read and follow all instructions on the label. · After 1 or 2 days of rest, begin moving the joint gently. While the joint is still healing, you can begin to exercise using activities that do not strain or hurt the painful joint. When should you call for help? Call your doctor now or seek immediate medical care if:    · You have signs of infection, such as:  ? Increased pain, swelling, warmth, and redness. ? Red streaks leading from the joint.   ? A fever.    Watch closely for changes in your health, and be sure to contact your doctor if:    · Your movement or symptoms are not getting better after 1 to 2 weeks of home treatment. Where can you learn more? Go to [email or web address].[email or web address] org and sign in to your [employer] account. Enter P205 in the [employer] box to learn more about \"Joint Pain: Care Instructions. \"     If you do not have an account, please click on the \"Sign Up Now\" link. Current as of: September 20, 2018  Content Version: 11.9  © 6322-7232 [employer]. Care instructions adapted under license by [facility] ([facility]). If you have questions about a medical condition or this instruction, always ask your healthcare professional. [address] any warranty or liability for your use of this information. Wear sling with activity  No lifting over 10lbs with left upper extremity   Follow up with Dr. Huey Lara next week. You need MRI on left elbow  Elevate and ice your left arm. Complete all of your antibiotics.   If symptoms persist or worsen, proceed to nearest emergency room        Electronically signed by KANDICE Wilkins NP on 4/19/2019 at 1:09 PM

## 2019-04-23 ENCOUNTER — APPOINTMENT (OUTPATIENT)
Dept: MAMMOGRAPHY | Facility: HOSPITAL | Age: 59
End: 2019-04-23

## 2019-06-03 ENCOUNTER — TRANSCRIBE ORDERS (OUTPATIENT)
Dept: ADMINISTRATIVE | Facility: HOSPITAL | Age: 59
End: 2019-06-03

## 2019-06-03 DIAGNOSIS — M19.022: Primary | ICD-10-CM

## 2019-06-07 ENCOUNTER — HOSPITAL ENCOUNTER (OUTPATIENT)
Dept: MRI IMAGING | Facility: HOSPITAL | Age: 59
Discharge: HOME | End: 2019-06-07
Admitting: ORTHOPAEDIC SURGERY

## 2019-06-07 DIAGNOSIS — M19.022: ICD-10-CM

## 2019-06-07 NOTE — NURSING NOTE
Pt scheduled for MRI with anesthesia on 6/20/2019. Spoke with Dr Durand regarding H&P. Okay to use H&P dated from 4/2019 from lAma PRINCE and current H&P from pt's PCP within 30 days from exam.

## 2019-06-20 ENCOUNTER — HOSPITAL ENCOUNTER (OUTPATIENT)
Dept: MRI IMAGING | Facility: HOSPITAL | Age: 59
Discharge: HOME | End: 2019-06-20

## 2019-06-20 ENCOUNTER — ANESTHESIA (OUTPATIENT)
Dept: MRI IMAGING | Facility: HOSPITAL | Age: 59
End: 2019-06-20

## 2019-06-20 ENCOUNTER — ANESTHESIA EVENT (OUTPATIENT)
Dept: MRI IMAGING | Facility: HOSPITAL | Age: 59
End: 2019-06-20

## 2019-06-20 VITALS
OXYGEN SATURATION: 98 % | HEIGHT: 64 IN | TEMPERATURE: 98.6 F | BODY MASS INDEX: 27.31 KG/M2 | SYSTOLIC BLOOD PRESSURE: 143 MMHG | DIASTOLIC BLOOD PRESSURE: 72 MMHG | RESPIRATION RATE: 12 BRPM | WEIGHT: 160 LBS | HEART RATE: 67 BPM

## 2019-06-20 VITALS
HEART RATE: 67 BPM | RESPIRATION RATE: 12 BRPM | SYSTOLIC BLOOD PRESSURE: 131 MMHG | TEMPERATURE: 98.7 F | DIASTOLIC BLOOD PRESSURE: 68 MMHG | OXYGEN SATURATION: 98 %

## 2019-06-20 PROCEDURE — A9577 INJ MULTIHANCE: HCPCS | Performed by: NURSE PRACTITIONER

## 2019-06-20 PROCEDURE — 70553 MRI BRAIN STEM W/O & W/DYE: CPT

## 2019-06-20 PROCEDURE — 0 GADOBENATE DIMEGLUMINE 529 MG/ML SOLUTION: Performed by: NURSE PRACTITIONER

## 2019-06-20 PROCEDURE — 73221 MRI JOINT UPR EXTREM W/O DYE: CPT

## 2019-06-20 PROCEDURE — 25010000002 PROPOFOL 10 MG/ML EMULSION: Performed by: NURSE ANESTHETIST, CERTIFIED REGISTERED

## 2019-06-20 RX ORDER — FENTANYL CITRATE 50 UG/ML
25 INJECTION, SOLUTION INTRAMUSCULAR; INTRAVENOUS AS NEEDED
Status: CANCELLED | OUTPATIENT
Start: 2019-06-20

## 2019-06-20 RX ORDER — PROPOFOL 10 MG/ML
VIAL (ML) INTRAVENOUS AS NEEDED
Status: DISCONTINUED | OUTPATIENT
Start: 2019-06-20 | End: 2019-06-20 | Stop reason: SURG

## 2019-06-20 RX ORDER — OXYCODONE HYDROCHLORIDE AND ACETAMINOPHEN 5; 325 MG/1; MG/1
1 TABLET ORAL ONCE AS NEEDED
Status: DISCONTINUED | OUTPATIENT
Start: 2019-06-20 | End: 2019-06-21 | Stop reason: HOSPADM

## 2019-06-20 RX ORDER — SODIUM CHLORIDE 0.9 % (FLUSH) 0.9 %
3 SYRINGE (ML) INJECTION AS NEEDED
Status: CANCELLED | OUTPATIENT
Start: 2019-06-20

## 2019-06-20 RX ORDER — OXYCODONE HYDROCHLORIDE AND ACETAMINOPHEN 5; 325 MG/1; MG/1
1 TABLET ORAL ONCE AS NEEDED
Status: CANCELLED | OUTPATIENT
Start: 2019-06-20

## 2019-06-20 RX ORDER — LABETALOL HYDROCHLORIDE 5 MG/ML
5 INJECTION, SOLUTION INTRAVENOUS
Status: DISCONTINUED | OUTPATIENT
Start: 2019-06-20 | End: 2019-06-21 | Stop reason: HOSPADM

## 2019-06-20 RX ORDER — SODIUM CHLORIDE 0.9 % (FLUSH) 0.9 %
1-10 SYRINGE (ML) INJECTION AS NEEDED
Status: CANCELLED | OUTPATIENT
Start: 2019-06-20

## 2019-06-20 RX ORDER — LEVOTHYROXINE SODIUM 0.12 MG/1
125 TABLET ORAL DAILY
COMMUNITY

## 2019-06-20 RX ORDER — MIDAZOLAM HYDROCHLORIDE 1 MG/ML
2 INJECTION INTRAMUSCULAR; INTRAVENOUS
Status: CANCELLED | OUTPATIENT
Start: 2019-06-20

## 2019-06-20 RX ORDER — FENTANYL CITRATE 50 UG/ML
25 INJECTION, SOLUTION INTRAMUSCULAR; INTRAVENOUS AS NEEDED
Status: DISCONTINUED | OUTPATIENT
Start: 2019-06-20 | End: 2019-06-21 | Stop reason: HOSPADM

## 2019-06-20 RX ORDER — SODIUM CHLORIDE, SODIUM LACTATE, POTASSIUM CHLORIDE, CALCIUM CHLORIDE 600; 310; 30; 20 MG/100ML; MG/100ML; MG/100ML; MG/100ML
100 INJECTION, SOLUTION INTRAVENOUS CONTINUOUS
Status: CANCELLED | OUTPATIENT
Start: 2019-06-20

## 2019-06-20 RX ORDER — IPRATROPIUM BROMIDE AND ALBUTEROL SULFATE 2.5; .5 MG/3ML; MG/3ML
3 SOLUTION RESPIRATORY (INHALATION) ONCE AS NEEDED
Status: CANCELLED | OUTPATIENT
Start: 2019-06-20

## 2019-06-20 RX ORDER — MIDAZOLAM HYDROCHLORIDE 1 MG/ML
1 INJECTION INTRAMUSCULAR; INTRAVENOUS
Status: CANCELLED | OUTPATIENT
Start: 2019-06-20

## 2019-06-20 RX ORDER — IPRATROPIUM BROMIDE AND ALBUTEROL SULFATE 2.5; .5 MG/3ML; MG/3ML
3 SOLUTION RESPIRATORY (INHALATION) ONCE AS NEEDED
Status: DISCONTINUED | OUTPATIENT
Start: 2019-06-20 | End: 2019-06-21 | Stop reason: HOSPADM

## 2019-06-20 RX ORDER — SODIUM CHLORIDE, SODIUM LACTATE, POTASSIUM CHLORIDE, CALCIUM CHLORIDE 600; 310; 30; 20 MG/100ML; MG/100ML; MG/100ML; MG/100ML
1000 INJECTION, SOLUTION INTRAVENOUS CONTINUOUS
Status: CANCELLED | OUTPATIENT
Start: 2019-06-20

## 2019-06-20 RX ORDER — LABETALOL HYDROCHLORIDE 5 MG/ML
5 INJECTION, SOLUTION INTRAVENOUS
Status: CANCELLED | OUTPATIENT
Start: 2019-06-20

## 2019-06-20 RX ORDER — LIDOCAINE HYDROCHLORIDE 20 MG/ML
INJECTION, SOLUTION INFILTRATION; PERINEURAL AS NEEDED
Status: DISCONTINUED | OUTPATIENT
Start: 2019-06-20 | End: 2019-06-20 | Stop reason: SURG

## 2019-06-20 RX ORDER — NALOXONE HCL 0.4 MG/ML
0.4 VIAL (ML) INJECTION AS NEEDED
Status: DISCONTINUED | OUTPATIENT
Start: 2019-06-20 | End: 2019-06-21 | Stop reason: HOSPADM

## 2019-06-20 RX ORDER — SODIUM CHLORIDE 0.9 % (FLUSH) 0.9 %
3 SYRINGE (ML) INJECTION EVERY 12 HOURS SCHEDULED
Status: CANCELLED | OUTPATIENT
Start: 2019-06-20

## 2019-06-20 RX ORDER — SODIUM CHLORIDE, SODIUM LACTATE, POTASSIUM CHLORIDE, CALCIUM CHLORIDE 600; 310; 30; 20 MG/100ML; MG/100ML; MG/100ML; MG/100ML
INJECTION, SOLUTION INTRAVENOUS CONTINUOUS PRN
Status: DISCONTINUED | OUTPATIENT
Start: 2019-06-20 | End: 2019-06-20 | Stop reason: SURG

## 2019-06-20 RX ORDER — NALOXONE HCL 0.4 MG/ML
0.4 VIAL (ML) INJECTION AS NEEDED
Status: CANCELLED | OUTPATIENT
Start: 2019-06-20

## 2019-06-20 RX ADMIN — LIDOCAINE HYDROCHLORIDE 100 MG: 20 INJECTION, SOLUTION INFILTRATION; PERINEURAL at 10:01

## 2019-06-20 RX ADMIN — SODIUM CHLORIDE, POTASSIUM CHLORIDE, SODIUM LACTATE AND CALCIUM CHLORIDE: 600; 310; 30; 20 INJECTION, SOLUTION INTRAVENOUS at 09:56

## 2019-06-20 RX ADMIN — PROPOFOL 150 MG: 10 INJECTION, EMULSION INTRAVENOUS at 10:01

## 2019-06-20 RX ADMIN — GADOBENATE DIMEGLUMINE 14 ML: 529 INJECTION, SOLUTION INTRAVENOUS at 11:24

## 2019-06-20 NOTE — ANESTHESIA PROCEDURE NOTES
Airway    General Information and Staff    CRNA: Amaury Zuniga, CRNA    Indications and Patient Condition  Indications for airway management: CNS depression    Preoxygenated: yes  Mask difficulty assessment: 0 - not attempted    Final Airway Details  Final airway type: supraglottic airway      Successful airway: unique  Size 3    Number of attempts at approach: 1    Additional Comments  Atraumatic Insertion

## 2019-06-20 NOTE — ANESTHESIA POSTPROCEDURE EVALUATION
Patient: Mary Medley    Procedure Summary     Date:  06/20/19 Room / Location:  [facility]    Anesthesia Start:  0956 Anesthesia Stop:  1120    Procedures:       MRI ELBOW LEFT WO CONTRAST      MRI BRAIN W WO CONTRAST Diagnosis:       Osteoarthritis, localized, elbow, left      Multiple sclerosis (CMS/HCC)    Scheduled Providers:   Provider:  Amaury Zuniga CRNA    Anesthesia Type:  general ASA Status:  3          Anesthesia Type: general  Last vitals  BP   131/68 (06/20/19 1230)   Temp   98.7 °F (37.1 °C) (06/20/19 1143)   Pulse   67 (06/20/19 1230)   Resp   12 (06/20/19 1230)     SpO2   98 % (06/20/19 1230)     Post Anesthesia Care and Evaluation    Patient location during evaluation: PACU  Patient participation: complete - patient participated  Level of consciousness: awake and alert  Pain management: adequate  Airway patency: patent  Anesthetic complications: No anesthetic complications  PONV Status: none  Cardiovascular status: acceptable and stable  Respiratory status: acceptable and unassisted  Hydration status: acceptable

## 2019-06-20 NOTE — ANESTHESIA PREPROCEDURE EVALUATION
Anesthesia Evaluation     Patient summary reviewed   no history of anesthetic complications:  NPO Solid Status: > 8 hours  NPO Liquid Status: > 8 hours           Airway   Mallampati: I  TM distance: >3 FB  Neck ROM: full  No difficulty expected  Dental - normal exam   (+) upper dentures    Pulmonary - negative pulmonary ROS   Cardiovascular - negative cardio ROS  Exercise tolerance: good (4-7 METS)    ECG reviewed      ROS comment: 02/2018 Low risk stress    Echo:  ·Left ventricular systolic function is normal. Estimated ejection fraction is 56-60%.  ·Left ventricular diastolic function is normal.  ·Mild mitral and tricuspid valve regurgitation is present  ·There is no evidence of right to left shunt on bubble study.    Neuro/Psych    ROS Comment: Multiple Sclerosis  GI/Hepatic/Renal/Endo    (+)  GERD,  hypothyroidism,     Musculoskeletal     (+) chronic pain,   Abdominal    Substance History      OB/GYN          Other        ROS/Med Hx Other: AVN elbow                Anesthesia Plan    ASA 3     general     intravenous induction   Anesthetic plan, all risks, benefits, and alternatives have been provided, discussed and informed consent has been obtained with: patient.

## 2019-07-24 ENCOUNTER — APPOINTMENT (OUTPATIENT)
Dept: MAMMOGRAPHY | Facility: HOSPITAL | Age: 59
End: 2019-07-24

## 2019-07-25 ENCOUNTER — TRANSCRIBE ORDERS (OUTPATIENT)
Dept: ADMINISTRATIVE | Facility: HOSPITAL | Age: 59
End: 2019-07-25

## 2019-07-25 DIAGNOSIS — M54.5 LOW BACK PAIN, UNSPECIFIED BACK PAIN LATERALITY, UNSPECIFIED CHRONICITY, WITH SCIATICA PRESENCE UNSPECIFIED: Primary | ICD-10-CM

## 2019-07-29 ENCOUNTER — HOSPITAL ENCOUNTER (OUTPATIENT)
Dept: MAMMOGRAPHY | Facility: HOSPITAL | Age: 59
Discharge: HOME | End: 2019-07-29
Admitting: FAMILY MEDICINE

## 2019-07-29 ENCOUNTER — HOSPITAL ENCOUNTER (OUTPATIENT)
Dept: GENERAL RADIOLOGY | Facility: HOSPITAL | Age: 59
Discharge: HOME | End: 2019-07-29

## 2019-07-29 DIAGNOSIS — M54.5 LOW BACK PAIN, UNSPECIFIED BACK PAIN LATERALITY, UNSPECIFIED CHRONICITY, WITH SCIATICA PRESENCE UNSPECIFIED: ICD-10-CM

## 2019-07-29 PROCEDURE — 72114 X-RAY EXAM L-S SPINE BENDING: CPT

## 2019-07-29 PROCEDURE — 77063 BREAST TOMOSYNTHESIS BI: CPT

## 2019-07-29 PROCEDURE — 77067 SCR MAMMO BI INCL CAD: CPT

## 2019-09-24 ENCOUNTER — LAB (OUTPATIENT)
Dept: LAB | Facility: HOSPITAL | Age: 59
End: 2019-09-24

## 2019-09-24 ENCOUNTER — TRANSCRIBE ORDERS (OUTPATIENT)
Dept: ADMINISTRATIVE | Facility: HOSPITAL | Age: 59
End: 2019-09-24

## 2019-09-24 DIAGNOSIS — I10 ESSENTIAL HYPERTENSION, MALIGNANT: Primary | ICD-10-CM

## 2019-09-24 DIAGNOSIS — I10 ESSENTIAL HYPERTENSION, MALIGNANT: ICD-10-CM

## 2019-09-24 DIAGNOSIS — R07.9 CHEST PAIN, UNSPECIFIED TYPE: ICD-10-CM

## 2019-09-24 DIAGNOSIS — R51.9 NONINTRACTABLE HEADACHE, UNSPECIFIED CHRONICITY PATTERN, UNSPECIFIED HEADACHE TYPE: ICD-10-CM

## 2019-09-24 LAB
ANION GAP SERPL CALCULATED.3IONS-SCNC: 10 MMOL/L (ref 4–13)
BUN BLD-MCNC: 18 MG/DL (ref 5–21)
BUN/CREAT SERPL: 21.4
CALCIUM SPEC-SCNC: 9.3 MG/DL (ref 8.4–10.4)
CHLORIDE SERPL-SCNC: 99 MMOL/L (ref 98–110)
CO2 SERPL-SCNC: 31 MMOL/L (ref 24–31)
CREAT BLD-MCNC: 0.84 MG/DL (ref 0.5–1.4)
GFR SERPL CREATININE-BSD FRML MDRD: 70 ML/MIN/1.73
GLUCOSE BLD-MCNC: 100 MG/DL (ref 70–100)
POTASSIUM BLD-SCNC: 4.5 MMOL/L (ref 3.5–5.3)
SODIUM BLD-SCNC: 140 MMOL/L (ref 135–145)

## 2019-09-24 PROCEDURE — 84443 ASSAY THYROID STIM HORMONE: CPT | Performed by: FAMILY MEDICINE

## 2019-09-24 PROCEDURE — 80048 BASIC METABOLIC PNL TOTAL CA: CPT

## 2019-09-24 PROCEDURE — 36415 COLL VENOUS BLD VENIPUNCTURE: CPT

## 2019-09-24 PROCEDURE — 83735 ASSAY OF MAGNESIUM: CPT | Performed by: FAMILY MEDICINE

## 2019-09-25 LAB
MAGNESIUM SERPL-MCNC: 1.9 MG/DL (ref 1.6–2.6)
TSH SERPL DL<=0.05 MIU/L-ACNC: 1.66 UIU/ML (ref 0.27–4.2)

## 2019-10-21 ENCOUNTER — HOSPITAL ENCOUNTER (EMERGENCY)
Facility: HOSPITAL | Age: 59
Discharge: HOME | End: 2019-10-22
Attending: EMERGENCY MEDICINE | Admitting: EMERGENCY MEDICINE

## 2019-10-21 ENCOUNTER — APPOINTMENT (OUTPATIENT)
Dept: GENERAL RADIOLOGY | Facility: HOSPITAL | Age: 59
End: 2019-10-21

## 2019-10-21 DIAGNOSIS — R07.9 CHEST PAIN, UNSPECIFIED TYPE: Primary | ICD-10-CM

## 2019-10-21 LAB
ALBUMIN SERPL-MCNC: 4.3 G/DL (ref 3.5–5.2)
ALBUMIN/GLOB SERPL: 1.8 G/DL
ALP SERPL-CCNC: 82 U/L (ref 39–117)
ALT SERPL W P-5'-P-CCNC: 18 U/L (ref 1–33)
ANION GAP SERPL CALCULATED.3IONS-SCNC: 11 MMOL/L (ref 5–15)
APTT PPP: 25.8 SECONDS (ref 24.1–35)
AST SERPL-CCNC: 27 U/L (ref 1–32)
BASOPHILS # BLD AUTO: 0.02 10*3/MM3 (ref 0–0.2)
BASOPHILS NFR BLD AUTO: 0.5 % (ref 0–1.5)
BILIRUB SERPL-MCNC: 0.2 MG/DL (ref 0.2–1.2)
BUN BLD-MCNC: 18 MG/DL (ref 6–20)
BUN/CREAT SERPL: 21.4 (ref 7–25)
CALCIUM SPEC-SCNC: 9.6 MG/DL (ref 8.6–10.5)
CHLORIDE SERPL-SCNC: 101 MMOL/L (ref 98–107)
CO2 SERPL-SCNC: 29 MMOL/L (ref 22–29)
CREAT BLD-MCNC: 0.84 MG/DL (ref 0.57–1)
D DIMER PPP FEU-MCNC: 0.32 MG/L (FEU) (ref 0–0.5)
DEPRECATED RDW RBC AUTO: 43.9 FL (ref 37–54)
EOSINOPHIL # BLD AUTO: 0.33 10*3/MM3 (ref 0–0.4)
EOSINOPHIL NFR BLD AUTO: 7.4 % (ref 0.3–6.2)
ERYTHROCYTE [DISTWIDTH] IN BLOOD BY AUTOMATED COUNT: 12.5 % (ref 12.3–15.4)
GFR SERPL CREATININE-BSD FRML MDRD: 70 ML/MIN/1.73
GLOBULIN UR ELPH-MCNC: 2.4 GM/DL
GLUCOSE BLD-MCNC: 98 MG/DL (ref 65–99)
HCT VFR BLD AUTO: 38.1 % (ref 34–46.6)
HGB BLD-MCNC: 12.9 G/DL (ref 12–15.9)
HOLD SPECIMEN: NORMAL
IMM GRANULOCYTES # BLD AUTO: 0.01 10*3/MM3 (ref 0–0.05)
IMM GRANULOCYTES NFR BLD AUTO: 0.2 % (ref 0–0.5)
INR PPP: 0.81 (ref 0.91–1.09)
LYMPHOCYTES # BLD AUTO: 1.37 10*3/MM3 (ref 0.7–3.1)
LYMPHOCYTES NFR BLD AUTO: 30.9 % (ref 19.6–45.3)
MCH RBC QN AUTO: 32.2 PG (ref 26.6–33)
MCHC RBC AUTO-ENTMCNC: 33.9 G/DL (ref 31.5–35.7)
MCV RBC AUTO: 95 FL (ref 79–97)
MONOCYTES # BLD AUTO: 0.66 10*3/MM3 (ref 0.1–0.9)
MONOCYTES NFR BLD AUTO: 14.9 % (ref 5–12)
NEUTROPHILS # BLD AUTO: 2.05 10*3/MM3 (ref 1.7–7)
NEUTROPHILS NFR BLD AUTO: 46.1 % (ref 42.7–76)
NRBC BLD AUTO-RTO: 0 /100 WBC (ref 0–0.2)
NT-PROBNP SERPL-MCNC: 90.2 PG/ML (ref 5–900)
PLATELET # BLD AUTO: 285 10*3/MM3 (ref 140–450)
PMV BLD AUTO: 9.5 FL (ref 6–12)
POTASSIUM BLD-SCNC: 4.3 MMOL/L (ref 3.5–5.2)
PROT SERPL-MCNC: 6.7 G/DL (ref 6–8.5)
PROTHROMBIN TIME: 11.4 SECONDS (ref 11.9–14.6)
RBC # BLD AUTO: 4.01 10*6/MM3 (ref 3.77–5.28)
SODIUM BLD-SCNC: 141 MMOL/L (ref 136–145)
TROPONIN T SERPL-MCNC: <0.01 NG/ML (ref 0–0.03)
WBC NRBC COR # BLD: 4.44 10*3/MM3 (ref 3.4–10.8)
WHOLE BLOOD HOLD SPECIMEN: NORMAL
WHOLE BLOOD HOLD SPECIMEN: NORMAL

## 2019-10-21 PROCEDURE — 25010000002 PROMETHAZINE PER 50 MG: Performed by: NURSE PRACTITIONER

## 2019-10-21 PROCEDURE — 85730 THROMBOPLASTIN TIME PARTIAL: CPT | Performed by: NURSE PRACTITIONER

## 2019-10-21 PROCEDURE — 93010 ELECTROCARDIOGRAM REPORT: CPT | Performed by: INTERNAL MEDICINE

## 2019-10-21 PROCEDURE — 83880 ASSAY OF NATRIURETIC PEPTIDE: CPT | Performed by: NURSE PRACTITIONER

## 2019-10-21 PROCEDURE — 85379 FIBRIN DEGRADATION QUANT: CPT | Performed by: NURSE PRACTITIONER

## 2019-10-21 PROCEDURE — 36415 COLL VENOUS BLD VENIPUNCTURE: CPT

## 2019-10-21 PROCEDURE — 71045 X-RAY EXAM CHEST 1 VIEW: CPT

## 2019-10-21 PROCEDURE — 80053 COMPREHEN METABOLIC PANEL: CPT | Performed by: NURSE PRACTITIONER

## 2019-10-21 PROCEDURE — 99284 EMERGENCY DEPT VISIT MOD MDM: CPT

## 2019-10-21 PROCEDURE — 93005 ELECTROCARDIOGRAM TRACING: CPT | Performed by: NURSE PRACTITIONER

## 2019-10-21 PROCEDURE — 84484 ASSAY OF TROPONIN QUANT: CPT | Performed by: NURSE PRACTITIONER

## 2019-10-21 PROCEDURE — 93005 ELECTROCARDIOGRAM TRACING: CPT | Performed by: EMERGENCY MEDICINE

## 2019-10-21 PROCEDURE — 85025 COMPLETE CBC W/AUTO DIFF WBC: CPT | Performed by: NURSE PRACTITIONER

## 2019-10-21 PROCEDURE — 96374 THER/PROPH/DIAG INJ IV PUSH: CPT

## 2019-10-21 PROCEDURE — 85610 PROTHROMBIN TIME: CPT | Performed by: NURSE PRACTITIONER

## 2019-10-21 RX ORDER — NITROGLYCERIN 0.4 MG/1
0.4 TABLET SUBLINGUAL
Status: COMPLETED | OUTPATIENT
Start: 2019-10-21 | End: 2019-10-21

## 2019-10-21 RX ORDER — PROMETHAZINE HYDROCHLORIDE 25 MG/ML
12.5 INJECTION, SOLUTION INTRAMUSCULAR; INTRAVENOUS ONCE
Status: COMPLETED | OUTPATIENT
Start: 2019-10-21 | End: 2019-10-21

## 2019-10-21 RX ORDER — PROMETHAZINE HYDROCHLORIDE 25 MG/ML
12.5 INJECTION, SOLUTION INTRAMUSCULAR; INTRAVENOUS ONCE
Status: COMPLETED | OUTPATIENT
Start: 2019-10-21 | End: 2019-10-22

## 2019-10-21 RX ORDER — AMLODIPINE BESYLATE 5 MG/1
5 TABLET ORAL DAILY
COMMUNITY

## 2019-10-21 RX ADMIN — PROMETHAZINE HYDROCHLORIDE 12.5 MG: 25 INJECTION INTRAMUSCULAR; INTRAVENOUS at 23:24

## 2019-10-21 RX ADMIN — NITROGLYCERIN 0.4 MG: 0.4 TABLET SUBLINGUAL at 23:27

## 2019-10-22 ENCOUNTER — APPOINTMENT (OUTPATIENT)
Dept: CT IMAGING | Facility: HOSPITAL | Age: 59
End: 2019-10-22

## 2019-10-22 VITALS
SYSTOLIC BLOOD PRESSURE: 131 MMHG | RESPIRATION RATE: 15 BRPM | WEIGHT: 156 LBS | HEIGHT: 64 IN | DIASTOLIC BLOOD PRESSURE: 61 MMHG | TEMPERATURE: 97.5 F | HEART RATE: 71 BPM | OXYGEN SATURATION: 99 % | BODY MASS INDEX: 26.63 KG/M2

## 2019-10-22 LAB
TROPONIN T SERPL-MCNC: <0.01 NG/ML (ref 0–0.03)
TROPONIN T SERPL-MCNC: <0.01 NG/ML (ref 0–0.03)

## 2019-10-22 PROCEDURE — 93010 ELECTROCARDIOGRAM REPORT: CPT | Performed by: INTERNAL MEDICINE

## 2019-10-22 PROCEDURE — 84484 ASSAY OF TROPONIN QUANT: CPT | Performed by: NURSE PRACTITIONER

## 2019-10-22 PROCEDURE — 96375 TX/PRO/DX INJ NEW DRUG ADDON: CPT

## 2019-10-22 PROCEDURE — 0 IOPAMIDOL PER 1 ML: Performed by: EMERGENCY MEDICINE

## 2019-10-22 PROCEDURE — 25010000002 PROMETHAZINE PER 50 MG: Performed by: NURSE PRACTITIONER

## 2019-10-22 PROCEDURE — 96376 TX/PRO/DX INJ SAME DRUG ADON: CPT

## 2019-10-22 PROCEDURE — 71275 CT ANGIOGRAPHY CHEST: CPT

## 2019-10-22 PROCEDURE — 25010000002 LORAZEPAM PER 2 MG: Performed by: EMERGENCY MEDICINE

## 2019-10-22 PROCEDURE — 84484 ASSAY OF TROPONIN QUANT: CPT | Performed by: EMERGENCY MEDICINE

## 2019-10-22 PROCEDURE — 93005 ELECTROCARDIOGRAM TRACING: CPT | Performed by: NURSE PRACTITIONER

## 2019-10-22 RX ORDER — ALUMINA, MAGNESIA, AND SIMETHICONE 2400; 2400; 240 MG/30ML; MG/30ML; MG/30ML
15 SUSPENSION ORAL ONCE
Status: COMPLETED | OUTPATIENT
Start: 2019-10-22 | End: 2019-10-22

## 2019-10-22 RX ORDER — KETOROLAC TROMETHAMINE 15 MG/ML
15 INJECTION, SOLUTION INTRAMUSCULAR; INTRAVENOUS ONCE
Status: DISCONTINUED | OUTPATIENT
Start: 2019-10-22 | End: 2019-10-22 | Stop reason: HOSPADM

## 2019-10-22 RX ORDER — LIDOCAINE HYDROCHLORIDE 20 MG/ML
15 SOLUTION OROPHARYNGEAL ONCE
Status: COMPLETED | OUTPATIENT
Start: 2019-10-22 | End: 2019-10-22

## 2019-10-22 RX ORDER — LORAZEPAM 2 MG/ML
1 INJECTION INTRAMUSCULAR ONCE
Status: COMPLETED | OUTPATIENT
Start: 2019-10-22 | End: 2019-10-22

## 2019-10-22 RX ADMIN — IOPAMIDOL 100 ML: 755 INJECTION, SOLUTION INTRAVENOUS at 05:43

## 2019-10-22 RX ADMIN — ALUMINUM HYDROXIDE, MAGNESIUM HYDROXIDE, AND DIMETHICONE 15 ML: 400; 400; 40 SUSPENSION ORAL at 02:29

## 2019-10-22 RX ADMIN — LIDOCAINE HYDROCHLORIDE 15 ML: 20 SOLUTION ORAL; TOPICAL at 02:29

## 2019-10-22 RX ADMIN — PROMETHAZINE HYDROCHLORIDE 12.5 MG: 25 INJECTION INTRAMUSCULAR; INTRAVENOUS at 00:48

## 2019-10-22 RX ADMIN — LORAZEPAM 1 MG: 2 INJECTION INTRAMUSCULAR; INTRAVENOUS at 05:37

## 2019-10-22 RX ADMIN — HYDROMORPHONE HYDROCHLORIDE 1 MG: 1 INJECTION, SOLUTION INTRAMUSCULAR; INTRAVENOUS; SUBCUTANEOUS at 00:13

## 2019-10-22 NOTE — ED PROVIDER NOTES
Subjective     Chest Pain   Pain location:  Substernal area and L chest  Pain quality: pressure and sharp    Pain radiates to:  L shoulder and L jaw  Pain severity:  Moderate  Onset quality:  Gradual  Timing:  Intermittent  Chronicity:  New  Relieved by:  Nothing  Worsened by:  Nothing  Associated symptoms: dizziness, nausea, shortness of breath and weakness    Associated symptoms: no abdominal pain and no fever    Risk factors: hypertension        Review of Systems   Constitutional: Negative for fever.   HENT: Negative.  Negative for congestion.    Respiratory: Positive for shortness of breath.    Cardiovascular: Positive for chest pain.   Gastrointestinal: Positive for nausea. Negative for abdominal pain.   Musculoskeletal: Negative.    Skin: Negative.    Neurological: Positive for dizziness and weakness.   All other systems reviewed and are negative.      Past Medical History:   Diagnosis Date   • Hypertension    • MS (multiple sclerosis) (CMS/HCC)        Allergies   Allergen Reactions   • Zofran [Ondansetron Hcl] Itching   • Ciprofloxacin Rash   • Penicillins Rash   • Sulfa Antibiotics Rash   • Thorazine [Chlorpromazine] Anxiety       Past Surgical History:   Procedure Laterality Date   • APPENDECTOMY     • CHOLECYSTECTOMY     • HAND RECONSTRUCTION Right    • HYSTERECTOMY     • OOPHORECTOMY     • REPLACEMENT TOTAL KNEE Left        Family History   Problem Relation Age of Onset   • Breast cancer Maternal Aunt    • No Known Problems Mother    • No Known Problems Father    • No Known Problems Sister    • No Known Problems Brother    • No Known Problems Daughter    • No Known Problems Son    • No Known Problems Maternal Grandmother    • No Known Problems Paternal Grandmother    • No Known Problems Paternal Aunt    • BRCA 1/2 Neg Hx    • Colon cancer Neg Hx    • Endometrial cancer Neg Hx    • Ovarian cancer Neg Hx        Social History     Socioeconomic History   • Marital status:      Spouse name: Not on file    • Number of children: Not on file   • Years of education: Not on file   • Highest education level: Not on file   Tobacco Use   • Smoking status: Former Smoker   • Smokeless tobacco: Never Used   Substance and Sexual Activity   • Alcohol use: No   • Drug use: No   • Sexual activity: Defer           Objective   Physical Exam   Constitutional: She is oriented to person, place, and time. She appears well-developed and well-nourished.   Patient is nauseated on exam and reports having chest pain   HENT:   Head: Normocephalic and atraumatic.   Nose: Nose normal.   Mouth/Throat: Oropharynx is clear and moist.   Eyes: Conjunctivae and EOM are normal. Pupils are equal, round, and reactive to light.   Neck: Normal range of motion. Neck supple.   Cardiovascular: Normal rate, regular rhythm, normal heart sounds and intact distal pulses.   Pulmonary/Chest: Effort normal and breath sounds normal.   Patient has reproducible chest wall pain   Abdominal: Soft. Bowel sounds are normal.   Musculoskeletal: Normal range of motion.   Neurological: She is alert and oriented to person, place, and time.   Skin: Skin is warm and dry.   Psychiatric: She has a normal mood and affect. Her behavior is normal.   Nursing note and vitals reviewed.      Procedures           ED Course  ED Course as of Oct 22 0658   Tue Oct 22, 2019   0224 2 sets of cardiac markers are negative. Patient had a stress study performed in 2/2018. Cardiac score is low at 2. She remains with intermittent chest discomfort and currently nauseated again. We will order GI cocktail. This will be a turn over for Dr. Johnson. See his note for details.  [TW]   0832 Patient has been in our ED now for > 8 hours. Multiplle modalities to help her pain have been tried without success. Given this I did order a CTA. She did agree to this but then changed her mind while in CT and has now refused. I will offer her something for anxiety as she sites this as her reason for not doing the  CTA. If she does continue to refuse I am going to have to discharge her against medical advice.   []   0540 She has now agreed to do the CTA  []   0656 Patient is falling asleep during my examination. She appears in no distress, CE and EKG x3 are unrevearling. Her labs and CTA are unrevealing. I did offer her a stress test but she and her  have both declined. She does have some reproducible pain but the large number of sedating medications she is taking have prevented me from discharging her with muscle realxers or further pain medication. At this time she appears well, non toxic, afebrile, Will dc to home.   []      ED Course User Index  [] Vladimir Johnson MD  [] Charisse Vizcaino APRN                HEART Score (for prediction of 6-week risk of major adverse cardiac event) reviewed and/or performed as part of the patient evaluation and treatment planning process.  The result associated with this review/performance is: 2       MDM    Final diagnoses:   Chest pain, unspecified type              Vladimir Johnson MD  10/22/19 0658

## 2019-10-22 NOTE — ED NOTES
Attempt times 2 to start another IV unsuccessful by this nurse. Right antecubital and left hand     Julita Carson RN  10/22/19 3741

## 2019-10-22 NOTE — DISCHARGE INSTRUCTIONS
Mary,    I am unsure of the cause of your pain. However, your heart enzymes and EKG do not show any signs of heart attack and your CT scan was without any acute findings to explain your symptoms. I did offer to do a stress test here but you have declined. I am going to provide you with a cardiologist to follow up with and I would suggest you see your family doctor this week

## 2019-10-25 ENCOUNTER — HOSPITAL ENCOUNTER (OUTPATIENT)
Dept: CARDIOLOGY | Facility: HOSPITAL | Age: 59
Discharge: HOME | End: 2019-10-25
Admitting: EMERGENCY MEDICINE

## 2019-10-25 VITALS
SYSTOLIC BLOOD PRESSURE: 120 MMHG | HEART RATE: 61 BPM | BODY MASS INDEX: 26.65 KG/M2 | DIASTOLIC BLOOD PRESSURE: 69 MMHG | HEIGHT: 64 IN | WEIGHT: 156.09 LBS

## 2019-10-25 DIAGNOSIS — R07.9 CHEST PAIN, UNSPECIFIED TYPE: ICD-10-CM

## 2019-10-25 LAB
BH CV STRESS BP STAGE 1: NORMAL
BH CV STRESS BP STAGE 2: NORMAL
BH CV STRESS BP STAGE 3: NORMAL
BH CV STRESS BP STAGE 4: NORMAL
BH CV STRESS DURATION MIN STAGE 1: 3
BH CV STRESS DURATION MIN STAGE 2: 3
BH CV STRESS DURATION MIN STAGE 3: 3
BH CV STRESS DURATION MIN STAGE 4: 0
BH CV STRESS DURATION SEC STAGE 1: 0
BH CV STRESS DURATION SEC STAGE 2: 0
BH CV STRESS DURATION SEC STAGE 3: 0
BH CV STRESS DURATION SEC STAGE 4: 17
BH CV STRESS GRADE STAGE 1: 10
BH CV STRESS GRADE STAGE 2: 12
BH CV STRESS GRADE STAGE 3: 14
BH CV STRESS GRADE STAGE 4: 16
BH CV STRESS HR STAGE 1: 88
BH CV STRESS HR STAGE 2: 97
BH CV STRESS HR STAGE 3: 108
BH CV STRESS HR STAGE 4: 113
BH CV STRESS METS STAGE 1: 5
BH CV STRESS METS STAGE 2: 7.5
BH CV STRESS METS STAGE 3: 10
BH CV STRESS METS STAGE 4: 13.5
BH CV STRESS PROTOCOL 1: NORMAL
BH CV STRESS SPEED STAGE 1: 1.7
BH CV STRESS SPEED STAGE 2: 2.5
BH CV STRESS SPEED STAGE 3: 3.4
BH CV STRESS SPEED STAGE 4: 4.2
BH CV STRESS STAGE 1: 1
BH CV STRESS STAGE 2: 2
BH CV STRESS STAGE 3: 3
BH CV STRESS STAGE 4: 4
MAXIMAL PREDICTED HEART RATE: 163 BPM
STRESS POST EXERCISE DUR MIN: 9 MIN
STRESS POST EXERCISE DUR SEC: 17 SEC
STRESS TARGET HR: 139 BPM

## 2019-10-25 PROCEDURE — 93017 CV STRESS TEST TRACING ONLY: CPT

## 2019-10-25 PROCEDURE — 93018 CV STRESS TEST I&R ONLY: CPT | Performed by: INTERNAL MEDICINE

## 2019-10-26 ENCOUNTER — TELEPHONE (OUTPATIENT)
Dept: EMERGENCY DEPT | Facility: HOSPITAL | Age: 59
End: 2019-10-26

## 2019-10-31 ENCOUNTER — OFFICE VISIT (OUTPATIENT)
Dept: CARDIOLOGY | Facility: CLINIC | Age: 59
End: 2019-10-31

## 2019-10-31 VITALS
HEART RATE: 69 BPM | BODY MASS INDEX: 27.14 KG/M2 | HEIGHT: 64 IN | DIASTOLIC BLOOD PRESSURE: 70 MMHG | WEIGHT: 159 LBS | SYSTOLIC BLOOD PRESSURE: 118 MMHG

## 2019-10-31 DIAGNOSIS — I10 ESSENTIAL HYPERTENSION: ICD-10-CM

## 2019-10-31 DIAGNOSIS — R07.89 OTHER CHEST PAIN: Primary | ICD-10-CM

## 2019-10-31 DIAGNOSIS — R94.39 EQUIVOCAL STRESS TEST: ICD-10-CM

## 2019-10-31 PROCEDURE — 99204 OFFICE O/P NEW MOD 45 MIN: CPT | Performed by: INTERNAL MEDICINE

## 2019-10-31 PROCEDURE — 93000 ELECTROCARDIOGRAM COMPLETE: CPT | Performed by: INTERNAL MEDICINE

## 2019-11-01 PROBLEM — R94.39 EQUIVOCAL STRESS TEST: Status: ACTIVE | Noted: 2019-11-01

## 2019-11-11 ENCOUNTER — HOSPITAL ENCOUNTER (OUTPATIENT)
Dept: CARDIOLOGY | Facility: HOSPITAL | Age: 59
Discharge: HOME | End: 2019-11-11

## 2019-11-11 VITALS
HEIGHT: 64 IN | BODY MASS INDEX: 27.14 KG/M2 | HEART RATE: 70 BPM | WEIGHT: 158.95 LBS | SYSTOLIC BLOOD PRESSURE: 155 MMHG | DIASTOLIC BLOOD PRESSURE: 85 MMHG

## 2019-11-11 DIAGNOSIS — R07.89 OTHER CHEST PAIN: ICD-10-CM

## 2019-11-11 PROCEDURE — 25010000002 REGADENOSON 0.4 MG/5ML SOLUTION: Performed by: INTERNAL MEDICINE

## 2019-11-11 PROCEDURE — A9500 TC99M SESTAMIBI: HCPCS | Performed by: INTERNAL MEDICINE

## 2019-11-11 PROCEDURE — 93017 CV STRESS TEST TRACING ONLY: CPT

## 2019-11-11 PROCEDURE — 78452 HT MUSCLE IMAGE SPECT MULT: CPT | Performed by: INTERNAL MEDICINE

## 2019-11-11 PROCEDURE — 0 TECHNETIUM SESTAMIBI: Performed by: INTERNAL MEDICINE

## 2019-11-11 PROCEDURE — 78452 HT MUSCLE IMAGE SPECT MULT: CPT

## 2019-11-11 PROCEDURE — 93018 CV STRESS TEST I&R ONLY: CPT | Performed by: INTERNAL MEDICINE

## 2019-11-11 RX ADMIN — REGADENOSON 0.4 MG: 0.08 INJECTION, SOLUTION INTRAVENOUS at 09:38

## 2019-11-11 RX ADMIN — CAFFEINE CITRATE 60 MG: 20 INJECTION, SOLUTION INTRAVENOUS at 09:41

## 2019-11-11 RX ADMIN — TECHNETIUM TC 99M SESTAMIBI 1 DOSE: 1 INJECTION INTRAVENOUS at 08:00

## 2019-11-11 RX ADMIN — TECHNETIUM TC 99M SESTAMIBI 1 DOSE: 1 INJECTION INTRAVENOUS at 09:38

## 2019-11-12 LAB
BH CV STRESS BP STAGE 1: NORMAL
BH CV STRESS COMMENTS STAGE 1: NORMAL
BH CV STRESS DOSE REGADENOSON STAGE 1: 0.4
BH CV STRESS DURATION MIN STAGE 1: 0
BH CV STRESS DURATION SEC STAGE 1: 10
BH CV STRESS HR STAGE 1: 89
BH CV STRESS PROTOCOL 1: NORMAL
BH CV STRESS RECOVERY BP: NORMAL MMHG
BH CV STRESS RECOVERY HR: 73 BPM
BH CV STRESS STAGE 1: 1
MAXIMAL PREDICTED HEART RATE: 163 BPM
PERCENT MAX PREDICTED HR: 54.6 %
STRESS BASELINE BP: NORMAL MMHG
STRESS BASELINE HR: 70 BPM
STRESS PERCENT HR: 64 %
STRESS POST EXERCISE DUR SEC: 10 SEC
STRESS POST PEAK BP: NORMAL MMHG
STRESS POST PEAK HR: 89 BPM
STRESS TARGET HR: 139 BPM

## 2020-01-08 ENCOUNTER — APPOINTMENT (OUTPATIENT)
Dept: GENERAL RADIOLOGY | Facility: HOSPITAL | Age: 60
End: 2020-01-08

## 2020-01-08 ENCOUNTER — HOSPITAL ENCOUNTER (EMERGENCY)
Facility: HOSPITAL | Age: 60
Discharge: HOME | End: 2020-01-08
Admitting: EMERGENCY MEDICINE

## 2020-01-08 VITALS
HEIGHT: 64 IN | HEART RATE: 79 BPM | SYSTOLIC BLOOD PRESSURE: 152 MMHG | RESPIRATION RATE: 20 BRPM | WEIGHT: 155 LBS | DIASTOLIC BLOOD PRESSURE: 84 MMHG | TEMPERATURE: 98 F | BODY MASS INDEX: 26.46 KG/M2 | OXYGEN SATURATION: 98 %

## 2020-01-08 DIAGNOSIS — S80.12XA CONTUSION OF MULTIPLE SITES OF LEFT LOWER EXTREMITY, INITIAL ENCOUNTER: Primary | ICD-10-CM

## 2020-01-08 PROCEDURE — 99283 EMERGENCY DEPT VISIT LOW MDM: CPT

## 2020-01-08 PROCEDURE — 73630 X-RAY EXAM OF FOOT: CPT

## 2020-01-08 PROCEDURE — 73610 X-RAY EXAM OF ANKLE: CPT

## 2020-01-08 PROCEDURE — 73590 X-RAY EXAM OF LOWER LEG: CPT

## 2020-01-08 RX ORDER — KETOROLAC TROMETHAMINE 10 MG/1
10 TABLET, FILM COATED ORAL EVERY 6 HOURS PRN
Qty: 10 TABLET | Refills: 0 | Status: SHIPPED | OUTPATIENT
Start: 2020-01-08 | End: 2020-02-10

## 2020-01-08 RX ORDER — MORPHINE SULFATE 10 MG/ML
6 INJECTION INTRAMUSCULAR; INTRAVENOUS; SUBCUTANEOUS ONCE
Status: DISCONTINUED | OUTPATIENT
Start: 2020-01-08 | End: 2020-01-08 | Stop reason: HOSPADM

## 2020-01-08 RX ORDER — OXYCODONE AND ACETAMINOPHEN 7.5; 325 MG/1; MG/1
1 TABLET ORAL ONCE
Status: COMPLETED | OUTPATIENT
Start: 2020-01-08 | End: 2020-01-08

## 2020-01-08 RX ADMIN — OXYCODONE HYDROCHLORIDE AND ACETAMINOPHEN 1 TABLET: 7.5; 325 TABLET ORAL at 21:33

## 2020-01-09 NOTE — DISCHARGE INSTRUCTIONS
Contusion  A contusion is a deep bruise. Contusions are the result of a blunt injury to tissues and muscle fibers under the skin. The injury causes bleeding under the skin. The skin overlying the contusion may turn blue, purple, or yellow. Minor injuries will give you a painless contusion, but more severe injuries cause contusions that may stay painful and swollen for a few weeks.  Follow these instructions at home:  Pay attention to any changes in your symptoms. Let your health care provider know about them. Take these actions to relieve your pain.  Managing pain, stiffness, and swelling    · Use resting, icing, applying pressure (compression), and raising (elevating) the injured area. This is often called the RICE strategy.  ? Rest the injured area. Return to your normal activities as told by your health care provider. Ask your health care provider what activities are safe for you.  ? If directed, put ice on the injured area:  § Put ice in a plastic bag.  § Place a towel between your skin and the bag.  § Leave the ice on for 20 minutes, 2-3 times per day.  ? If directed, apply light compression to the injured area using an elastic bandage. Make sure the bandage is not wrapped too tightly. Remove and reapply the bandage as directed by your health care provider.  ? If possible, raise (elevate) the injured area above the level of your heart while you are sitting or lying down.  General instructions  · Take over-the-counter and prescription medicines only as told by your health care provider.  · Keep all follow-up visits as told by your health care provider. This is important.  Contact a health care provider if:  · Your symptoms do not improve after several days of treatment.  · Your symptoms get worse.  · You have difficulty moving the injured area.  Get help right away if:  · You have severe pain.  · You have numbness in a hand or foot.  · Your hand or foot turns pale or cold.  Summary  · A contusion is a deep  bruise.  · Contusions are the result of a blunt injury to tissues and muscle fibers under the skin.  · It is treated with rest, ice, compression, and elevation. You may be given over-the-counter medicines for pain.  · Contact a health care provider if your symptoms do not improve, or get worse.  · Get help right away if you have severe pain, have numbness, or the area turns pale or cold.  This information is not intended to replace advice given to you by your health care provider. Make sure you discuss any questions you have with your health care provider.  Document Released: 09/27/2006 Document Revised: 08/08/2019 Document Reviewed: 08/08/2019  [employer] Interactive Patient Education © 2019 [employer]

## 2020-01-13 NOTE — ED PROVIDER NOTES
"Subjective   History of Present Illness    Patient is a 59-year-old female presenting to ED with left lower leg injury.  Patient reported she was walking through a parking lot carrying stuff when she suddenly struck her left anterior shin into the hitch of a trailer.  Patient denies any visual changes or syncope, describing that this was just mechanical in nature and she was not watching where she was going.  Patient stated since this time she has had significant pain in her mid anterior left shin which radiates down into her foot and she has been unable to ambulate due to the \"excruciating\" pain in the mid leg.  Patient is sure that she broke something so she did not take any medications prior to arrival in case we needed to administer pain medications here.  Patient denies any sensation changes.  Patient denies any other injuries and reported proximal to this her left lower extremity is fine with no complaints or pain.  Patient denies any history of previous injury to this left lower extremity.    Review of Systems   Constitutional: Negative.    HENT: Negative.    Eyes: Negative.  Negative for visual disturbance.   Respiratory: Negative.    Cardiovascular: Negative.    Gastrointestinal: Negative.  Negative for nausea and vomiting.   Genitourinary: Negative.    Musculoskeletal: Positive for arthralgias (Left lower anterior leg) and gait problem (Due to pain in LLE).   Skin: Negative.  Negative for rash and wound.   Neurological: Negative for weakness and numbness.   Psychiatric/Behavioral: Negative.    All other systems reviewed and are negative.      Past Medical History:   Diagnosis Date   • Disease of thyroid gland    • Glaucoma    • Hypertension    • MS (multiple sclerosis) (CMS/[facility])        Allergies   Allergen Reactions   • Zofran [Ondansetron Hcl] Itching   • Ciprofloxacin Rash   • Penicillins Rash   • Sulfa Antibiotics Rash   • Thorazine [Chlorpromazine] Anxiety       Past Surgical History:   Procedure " Laterality Date   • APPENDECTOMY     • CHOLECYSTECTOMY     • HAND RECONSTRUCTION Right    • HYSTERECTOMY     • OOPHORECTOMY     • REPLACEMENT TOTAL KNEE Left        Family History   Problem Relation Age of Onset   • Breast cancer Maternal Aunt    • Heart failure Mother    • Heart disease Father    • Heart attack Father    • No Known Problems Sister    • No Known Problems Brother    • No Known Problems Daughter    • No Known Problems Son    • No Known Problems Maternal Grandmother    • No Known Problems Paternal Grandmother    • No Known Problems Paternal Aunt    • BRCA 1/2 Neg Hx    • Colon cancer Neg Hx    • Endometrial cancer Neg Hx    • Ovarian cancer Neg Hx        Social History     Socioeconomic History   • Marital status:      Spouse name: Not on file   • Number of children: Not on file   • Years of education: Not on file   • Highest education level: Not on file   Tobacco Use   • Smoking status: Former Smoker   • Smokeless tobacco: Never Used   Substance and Sexual Activity   • Alcohol use: Yes     Comment: occ   • Drug use: No   • Sexual activity: Defer           Objective   Physical Exam   Constitutional: She is oriented to person, place, and time. She appears well-developed and well-nourished. She is cooperative. No distress.   HENT:   Head: Normocephalic and atraumatic.   Right Ear: External ear normal.   Left Ear: External ear normal.   Mouth/Throat: Uvula is midline, oropharynx is clear and moist and mucous membranes are normal.   Eyes: Pupils are equal, round, and reactive to light. Conjunctivae, EOM and lids are normal. Right conjunctiva is not injected. Left conjunctiva is not injected.   Neck: Normal range of motion. Neck supple.   Cardiovascular: Normal rate, regular rhythm, normal heart sounds, intact distal pulses and normal pulses.   No murmur heard.  Pulses:       Radial pulses are 2+ on the right side, and 2+ on the left side.        Dorsalis pedis pulses are 2+ on the right side, and 2+  "on the left side.        Posterior tibial pulses are 2+ on the right side, and 2+ on the left side.   Pulmonary/Chest: Effort normal and breath sounds normal. No respiratory distress. She has no decreased breath sounds. She has no wheezes. She has no rhonchi. She has no rales. She exhibits no bony tenderness.   Abdominal: Soft. Normal appearance and bowel sounds are normal. There is no tenderness. There is no guarding and no CVA tenderness.   Musculoskeletal:        Cervical back: Normal.        Thoracic back: Normal.        Lumbar back: Normal.        Left lower leg: She exhibits bony tenderness. She exhibits no edema and no deformity.        Legs:  Tenderness to palpitation over anterior mid shin aspect of the left lower leg with minimal swelling noted.  No evidence of bruising or contusion.  Normal sensation examination of the entire left lower extremity with good strength and range of motion of all joints.  No calf tenderness to palpitation.  Cap refill less than 2 seconds.  Normal inspection of right lower extremity.   Lymphadenopathy:     She has no cervical adenopathy.   Neurological: She is alert and oriented to person, place, and time. She has normal strength. Gait normal.   Skin: Skin is warm, dry and intact. Capillary refill takes less than 2 seconds. No rash noted. She is not diaphoretic.   Psychiatric: She has a normal mood and affect. Her speech is normal and behavior is normal. Thought content normal.   Nursing note and vitals reviewed.      Procedures           ED Course  ED Course as of Jan 13 1534   Wed Jan 08, 2020   2100 Patient is now declining morphine reporting it \"makes her feel angry\".    [JS]   2105 Upon reevaluation patient is resting in chair.  Patient appreciative for the oral pain medication.  Reviewed with patient imaging results, diagnosis of contusion, and importance of rest, ice, elevation, and compression as tolerated.  Discussed with patient importance of PCP follow-up to ensure " improvement and resolution of symptoms.  Discussed with patient return precautions and ability to return to ED at anytime as needed.  Patient very appreciative for help without any further questions, concerns, or needs at this time and is stable for discharge.    [JS]      ED Course User Index  [JS] Sergo Castillo PA-C                                               [facility]    Final diagnoses:   Contusion of multiple sites of left lower extremity, initial encounter            Sergo Castillo PA-C  01/13/20 1534

## 2020-02-10 ENCOUNTER — TELEPHONE (OUTPATIENT)
Dept: URGENT CARE | Facility: CLINIC | Age: 60
End: 2020-02-10

## 2020-02-10 DIAGNOSIS — J01.40 ACUTE PANSINUSITIS, RECURRENCE NOT SPECIFIED: Primary | ICD-10-CM

## 2020-02-10 RX ORDER — CEFDINIR 300 MG/1
300 CAPSULE ORAL 2 TIMES DAILY
Qty: 14 CAPSULE | Refills: 0 | Status: SHIPPED | OUTPATIENT
Start: 2020-02-10 | End: 2020-02-17

## 2020-02-11 ENCOUNTER — TELEPHONE (OUTPATIENT)
Dept: URGENT CARE | Facility: CLINIC | Age: 60
End: 2020-02-11

## 2020-02-11 NOTE — TELEPHONE ENCOUNTER
Pt called to say that the medication called in yesterday by Ngozi PRINCE to replace the antibiotic sent in by Annel PRINCE at the time of the visit, the Pharmacy will not release to her because she is allergic to it.  [facility] called and they have 3 additional medications that the pt is allergic to that we were not told at the time of the visit. (Macrolides, Quinolones, Cephalosporin-now added to the pts allergy list) Clindamycin 300 mg TID x 10 days called in per Ngzoi PRINEC after talking to the pharmacist at [facility].

## 2020-07-27 ENCOUNTER — TRANSCRIBE ORDERS (OUTPATIENT)
Dept: ADMINISTRATIVE | Facility: HOSPITAL | Age: 60
End: 2020-07-27

## 2020-07-27 DIAGNOSIS — Z12.31 ENCOUNTER FOR SCREENING MAMMOGRAM FOR MALIGNANT NEOPLASM OF BREAST: Primary | ICD-10-CM

## 2020-07-31 ENCOUNTER — HOSPITAL ENCOUNTER (OUTPATIENT)
Dept: MAMMOGRAPHY | Facility: HOSPITAL | Age: 60
Discharge: HOME | End: 2020-07-31
Admitting: FAMILY MEDICINE

## 2020-07-31 DIAGNOSIS — Z12.31 ENCOUNTER FOR SCREENING MAMMOGRAM FOR MALIGNANT NEOPLASM OF BREAST: ICD-10-CM

## 2020-07-31 PROCEDURE — 77063 BREAST TOMOSYNTHESIS BI: CPT

## 2020-07-31 PROCEDURE — 77067 SCR MAMMO BI INCL CAD: CPT

## 2020-08-25 LAB — HOLD SPECIMEN: NORMAL

## 2020-08-26 ENCOUNTER — OFFICE VISIT (OUTPATIENT)
Dept: GASTROENTEROLOGY | Facility: CLINIC | Age: 60
End: 2020-08-26

## 2020-08-26 VITALS
BODY MASS INDEX: 29.06 KG/M2 | HEART RATE: 74 BPM | DIASTOLIC BLOOD PRESSURE: 78 MMHG | HEIGHT: 63 IN | WEIGHT: 164 LBS | OXYGEN SATURATION: 98 % | TEMPERATURE: 97.3 F | SYSTOLIC BLOOD PRESSURE: 138 MMHG

## 2020-08-26 DIAGNOSIS — R13.14 PHARYNGOESOPHAGEAL DYSPHAGIA: ICD-10-CM

## 2020-08-26 DIAGNOSIS — Z12.11 ENCOUNTER FOR SCREENING FOR MALIGNANT NEOPLASM OF COLON: Primary | ICD-10-CM

## 2020-08-26 DIAGNOSIS — I10 ESSENTIAL HYPERTENSION: ICD-10-CM

## 2020-08-26 PROCEDURE — 99214 OFFICE O/P EST MOD 30 MIN: CPT | Performed by: NURSE PRACTITIONER

## 2020-08-26 NOTE — PROGRESS NOTES
[facility]    Primary Physician Jose Ramon Agarwal MD    2020    Mary Medley   1960      Chief Complaint   Patient presents with   • Colonoscopy   dysphagia     Subjective     HPI    Mary Medley is a 60 y.o. female who presents as a referral for preventative maintenance. She has no complaints of nausea or vomiting. No change in bowels. No wt loss. No BRBPR. No melena. No abdominal pain.    Dysphagia   Dysphagia x 2 months. She points to the neck area where solid foods will hang.  Has had to regurgitate to get relief.  No significant reflux symptoms.       She has never a colonoscopy. The patient does not have history of colon polyps. The patient does not have history of colon cancer.   There is not a  family history of colon polyps. There is not a family history of colon cancer.     She has    Past Medical History:   Diagnosis Date   • Disease of thyroid gland    • Glaucoma    • Hypertension    • MS (multiple sclerosis) (CMS/HCC)        Past Surgical History:   Procedure Laterality Date   • APPENDECTOMY     •  SECTION     • CHOLECYSTECTOMY     • HAND RECONSTRUCTION Right    • HYSTERECTOMY     • OOPHORECTOMY     • REPLACEMENT TOTAL KNEE Left    • TONSILLECTOMY         Outpatient Medications Marked as Taking for the 20 encounter (Office Visit) with Pearl Dodson APRN   Medication Sig Dispense Refill   • acetaminophen (TYLENOL) 325 MG tablet Take 300 mg by mouth 3 (Three) Times a Day.     • Albuterol (PROVENTIL IN) Inhale As Needed.     • amLODIPine (NORVASC) 5 MG tablet Take 5 mg by mouth Daily.     • brimonidine (ALPHAGAN) 0.2 % ophthalmic solution INSTILL 1 DROP BOTH EYES TWICE A DAY AS DIRECTED     • estradiol (ESTRACE) 2 MG tablet Take 2 mg by mouth Daily.     • fingolimod (GILENYA) 0.5 MG capsule Take 0.5 mg by mouth Daily.     • fluticasone (Flonase) 50 MCG/ACT nasal spray 2 sprays into the nostril(s) as directed by provider Daily. 2 puffs each nostril  1 bottle 0   • gabapentin (NEURONTIN) 800 MG tablet Take 900 mg by mouth 3 (Three) Times a Day.     • HYDROcodone-acetaminophen (NORCO) 5-325 MG per tablet Take 1 tablet by mouth 3 (Three) Times a Day.     • levothyroxine (SYNTHROID, LEVOTHROID) 125 MCG tablet Take 125 mcg by mouth Daily.     • promethazine (PHENERGAN) 12.5 MG tablet Take 1 tablet by mouth Every 6 (Six) Hours As Needed for Nausea or Vomiting. 10 tablet 0   • traZODone (DESYREL) 150 MG tablet Take 300 mg by mouth Every Night.         Allergies   Allergen Reactions   • Quinolones Other (See Comments)     Per [facility] pt is allergic   • Zofran [Ondansetron Hcl] Itching   • Cephalosporins Rash and Other (See Comments)     Per [facility] pt is allergic   • Ciprofloxacin Rash   • Erythromycin Rash   • Macrolides And Ketolides Rash and Other (See Comments)     Per [facility] pt has allergy   • Penicillins Rash   • Sulfa Antibiotics Rash   • Thorazine [Chlorpromazine] Anxiety       Social History     Socioeconomic History   • Marital status:      Spouse name: Not on file   • Number of children: Not on file   • Years of education: Not on file   • Highest education level: Not on file   Tobacco Use   • Smoking status: Former Smoker   • Smokeless tobacco: Never Used   Substance and Sexual Activity   • Alcohol use: Yes     Comment: occ   • Drug use: No   • Sexual activity: Defer       Family History   Problem Relation Age of Onset   • Breast cancer Maternal Aunt    • Heart failure Mother    • Heart disease Father    • Heart attack Father    • No Known Problems Sister    • No Known Problems Brother    • No Known Problems Daughter    • No Known Problems Son    • No Known Problems Maternal Grandmother    • No Known Problems Paternal Grandmother    • No Known Problems Paternal Aunt    • No Known Problems Other    • BRCA 1/2 Neg Hx    • Colon cancer Neg Hx    • Endometrial cancer Neg Hx    • Ovarian cancer Neg Hx    • Colon polyps Neg Hx               Review of Systems   Constitutional: Negative for appetite change, chills, fatigue, fever and unexpected weight change.   HENT: Positive for trouble swallowing. Negative for sore throat.    Eyes: Negative for visual disturbance.   Respiratory: Negative for cough, shortness of breath and wheezing.    Cardiovascular: Negative for chest pain and palpitations.   Gastrointestinal: Negative for abdominal distention, abdominal pain, anal bleeding, blood in stool, constipation, diarrhea, nausea and vomiting.        As mentioned in hpi   Genitourinary: Negative for difficulty urinating and hematuria.   Musculoskeletal: Positive for back pain (chronic back and leg pain ). Negative for arthralgias.   Skin: Negative for color change and rash.   Neurological: Negative for dizziness, seizures, syncope, light-headedness and headaches.   Hematological: Negative for adenopathy.   Psychiatric/Behavioral: Negative for confusion. The patient is not nervous/anxious.        Objective     Vitals:    08/26/20 0953   BP: 138/78   Pulse: 74   Temp: 97.3 °F (36.3 °C)   SpO2: 98%         08/26/20 0953   Weight: 74.4 kg (164 lb)     Body mass index is 29.05 kg/m².    Physical Exam   Constitutional: She appears well-developed and well-nourished. No distress.   HENT:   Head: Normocephalic and atraumatic.   Eyes: EOM are normal. No scleral icterus.   Neck: Neck supple. No JVD present.   Cardiovascular: Normal rate, regular rhythm and normal heart sounds.   Pulmonary/Chest: Effort normal and breath sounds normal.   Abdominal: Soft. Bowel sounds are normal. She exhibits no distension. There is no tenderness.   Musculoskeletal: Normal range of motion. She exhibits no deformity.   Neurological: She is alert.   Skin: Skin is warm and dry. No rash noted.   Psychiatric: She has a normal mood and affect. Her behavior is normal.   Vitals reviewed.      Imaging Results (Most Recent)     None          Assessment/Plan     Mary was seen today for  colonoscopy.    Diagnoses and all orders for this visit:    Encounter for screening for malignant neoplasm of colon  -     Case Request; Standing  -     Case Request    Pharyngoesophageal dysphagia  -     Case Request; Standing  -     Case Request    Essential hypertension    Other orders  -     Follow Anesthesia Guidelines / Protocol; Future  -     Obtain Informed Consent; Future  -     polyethylene glycol (Golytely) 236 g solution; Take 4,000 mL by mouth 1 (One) Time for 1 dose. Take as directed per instruction sheet.    Plan for screening colonoscopy.    In regards to dysphagia, differential diagnosis discussed.  Recommend cut food small pieces and chew well.  I recommend upper endoscopy for further evaluation and she is agreeable.    The patient was advised to take any blood pressure or heart  medications the morning of  procedure if that is when he/she normally takes.             Body mass index is 29.05 kg/m².    Patient's Body mass index is 29.05 kg/m². BMI is above normal parameters. Recommendations include: recommend weight loss, no f/u.      ESOPHAGOGASTRODUODENOSCOPY WITH ANESTHESIA (N/A), COLONOSCOPY WITH ANESTHESIA (N/A)  All risks, benefits, alternatives, and indications of colonoscopy procedure have been discussed with the patient. Risks to include perforation of the colon requiring possible surgery or colostomy, risk of bleeding from biopsies or removal of colon tissue, possibility of missing a colon polyp or cancer, or adverse drug reaction.  Benefits to include the diagnosis and management of disease of the colon and rectum. Alternatives to include barium enema, radiographic evaluation, lab testing or no intervention. Pt verbalizes understanding and agrees.     Risk, benefits, and alternatives of endoscopy were explained in full.  They understand that there is a risk of bleeding, perforation, and infection.  The risk of perforation goes up with esophageal dilation.  Other options to evaluate UGI  complaints could involve barium swallow or UGI series, but these would be diagnostic tests only.  Patient was given time to ask questions.  I answered them to their satisfaction and they are agreeable to proceeding    SURESH Lezama Dragon/transcription disclaimer:  Much of this encounter note is electronic transcription/translation of spoken language to printed text.  The electronic translation of spoken language may be erroneous, or at times, nonsensical words or phrases may be inadvertently transcribed.  Although I have reviewed the note for such errors, some may still exist.

## 2020-09-15 ENCOUNTER — TRANSCRIBE ORDERS (OUTPATIENT)
Dept: ADMINISTRATIVE | Facility: HOSPITAL | Age: 60
End: 2020-09-15

## 2020-09-15 DIAGNOSIS — Z01.818 PREOPERATIVE TESTING: Primary | ICD-10-CM

## 2020-09-21 ENCOUNTER — LAB (OUTPATIENT)
Dept: LAB | Facility: HOSPITAL | Age: 60
End: 2020-09-21

## 2020-09-21 PROCEDURE — C9803 HOPD COVID-19 SPEC COLLECT: HCPCS | Performed by: INTERNAL MEDICINE

## 2020-09-21 PROCEDURE — 87635 SARS-COV-2 COVID-19 AMP PRB: CPT | Performed by: INTERNAL MEDICINE

## 2020-09-22 LAB — SARS-COV-2 N GENE RESP QL NAA+PROBE: NOT DETECTED

## 2020-09-23 ENCOUNTER — HOSPITAL ENCOUNTER (OUTPATIENT)
Facility: HOSPITAL | Age: 60
Setting detail: HOSPITAL OUTPATIENT SURGERY
Discharge: HOME | End: 2020-09-23
Attending: INTERNAL MEDICINE | Admitting: INTERNAL MEDICINE

## 2020-09-23 ENCOUNTER — ANESTHESIA (OUTPATIENT)
Dept: GASTROENTEROLOGY | Facility: HOSPITAL | Age: 60
End: 2020-09-23

## 2020-09-23 ENCOUNTER — ANESTHESIA EVENT (OUTPATIENT)
Dept: GASTROENTEROLOGY | Facility: HOSPITAL | Age: 60
End: 2020-09-23

## 2020-09-23 ENCOUNTER — TELEPHONE (OUTPATIENT)
Dept: GASTROENTEROLOGY | Facility: CLINIC | Age: 60
End: 2020-09-23

## 2020-09-23 VITALS
WEIGHT: 159 LBS | HEIGHT: 63 IN | DIASTOLIC BLOOD PRESSURE: 73 MMHG | HEART RATE: 73 BPM | OXYGEN SATURATION: 99 % | RESPIRATION RATE: 17 BRPM | BODY MASS INDEX: 28.17 KG/M2 | TEMPERATURE: 96.8 F | SYSTOLIC BLOOD PRESSURE: 128 MMHG

## 2020-09-23 DIAGNOSIS — R13.14 PHARYNGOESOPHAGEAL DYSPHAGIA: ICD-10-CM

## 2020-09-23 DIAGNOSIS — Z12.11 ENCOUNTER FOR SCREENING FOR MALIGNANT NEOPLASM OF COLON: ICD-10-CM

## 2020-09-23 PROCEDURE — 43248 EGD GUIDE WIRE INSERTION: CPT | Performed by: INTERNAL MEDICINE

## 2020-09-23 PROCEDURE — 43239 EGD BIOPSY SINGLE/MULTIPLE: CPT | Performed by: INTERNAL MEDICINE

## 2020-09-23 PROCEDURE — 25010000002 PROPOFOL 10 MG/ML EMULSION: Performed by: NURSE ANESTHETIST, CERTIFIED REGISTERED

## 2020-09-23 PROCEDURE — G0121 COLON CA SCRN NOT HI RSK IND: HCPCS | Performed by: INTERNAL MEDICINE

## 2020-09-23 PROCEDURE — 88305 TISSUE EXAM BY PATHOLOGIST: CPT | Performed by: INTERNAL MEDICINE

## 2020-09-23 RX ORDER — SODIUM CHLORIDE 0.9 % (FLUSH) 0.9 %
10 SYRINGE (ML) INJECTION AS NEEDED
Status: DISCONTINUED | OUTPATIENT
Start: 2020-09-23 | End: 2020-09-23 | Stop reason: HOSPADM

## 2020-09-23 RX ORDER — SODIUM CHLORIDE 0.9 % (FLUSH) 0.9 %
10 SYRINGE (ML) INJECTION EVERY 12 HOURS SCHEDULED
Status: DISCONTINUED | OUTPATIENT
Start: 2020-09-23 | End: 2020-09-23 | Stop reason: HOSPADM

## 2020-09-23 RX ORDER — PROPOFOL 10 MG/ML
VIAL (ML) INTRAVENOUS AS NEEDED
Status: DISCONTINUED | OUTPATIENT
Start: 2020-09-23 | End: 2020-09-23 | Stop reason: SURG

## 2020-09-23 RX ORDER — SODIUM CHLORIDE 9 MG/ML
100 INJECTION, SOLUTION INTRAVENOUS CONTINUOUS
Status: DISCONTINUED | OUTPATIENT
Start: 2020-09-23 | End: 2020-09-23 | Stop reason: HOSPADM

## 2020-09-23 RX ADMIN — PROPOFOL 100 MG: 10 INJECTION, EMULSION INTRAVENOUS at 10:29

## 2020-09-23 RX ADMIN — PROPOFOL 100 MG: 10 INJECTION, EMULSION INTRAVENOUS at 10:34

## 2020-09-23 RX ADMIN — PROPOFOL 100 MG: 10 INJECTION, EMULSION INTRAVENOUS at 10:42

## 2020-09-23 RX ADMIN — PROPOFOL 100 MG: 10 INJECTION, EMULSION INTRAVENOUS at 10:47

## 2020-09-23 RX ADMIN — SODIUM CHLORIDE 100 ML/HR: 9 INJECTION, SOLUTION INTRAVENOUS at 08:55

## 2020-09-23 RX ADMIN — LIDOCAINE HYDROCHLORIDE 100 MG: 20 INJECTION, SOLUTION INTRAVENOUS at 10:29

## 2020-09-23 NOTE — ANESTHESIA POSTPROCEDURE EVALUATION
Patient: Mary Medley    Procedure Summary     Date: 09/23/20 Room / Location: [facility] ENDOSCOPY 5 / [facility]    Anesthesia Start: 1024 Anesthesia Stop: 1056    Procedures:       ESOPHAGOGASTRODUODENOSCOPY WITH ANESTHESIA (N/A )      COLONOSCOPY WITH ANESTHESIA (N/A ) Diagnosis:       Encounter for screening for malignant neoplasm of colon      Pharyngoesophageal dysphagia      (Encounter for screening for malignant neoplasm of colon [Z12.11])      (Pharyngoesophageal dysphagia [R13.14])    Surgeon: Eduard Taylor MD Provider: Amaury Zuniga CRNA    Anesthesia Type: MAC ASA Status: 2          Anesthesia Type: MAC    Vitals  Vitals Value Taken Time   /49 09/23/20 1056   Temp     Pulse 72 09/23/20 1101   Resp     SpO2 100 % 09/23/20 1101   Vitals shown include unvalidated device data.        Post Anesthesia Care and Evaluation    Patient location during evaluation: PHASE II  Patient participation: complete - patient participated  Level of consciousness: awake and alert  Pain management: adequate  Airway patency: patent  Anesthetic complications: No anesthetic complications  PONV Status: none  Cardiovascular status: acceptable and stable  Respiratory status: acceptable and unassisted  Hydration status: acceptable

## 2020-09-23 NOTE — ANESTHESIA PREPROCEDURE EVALUATION
Anesthesia Evaluation     NPO Solid Status: > 8 hours  NPO Liquid Status: > 8 hours           Airway   Mallampati: II  TM distance: >3 FB  Neck ROM: full  Dental    (+) upper dentures    Pulmonary - negative pulmonary ROS   Cardiovascular   Exercise tolerance: good (4-7 METS)    (+) hypertension well controlled,       Neuro/Psych- negative ROS  GI/Hepatic/Renal/Endo - negative ROS     Musculoskeletal (-) negative ROS    Abdominal    Substance History - negative use     OB/GYN negative ob/gyn ROS         Other - negative ROS                     Anesthesia Plan    ASA 2     MAC     intravenous induction     Anesthetic plan, all risks, benefits, and alternatives have been provided, discussed and informed consent has been obtained with: patient.  Use of blood products discussed with patient .

## 2020-09-24 LAB
CYTO UR: NORMAL
LAB AP CASE REPORT: NORMAL
PATH REPORT.FINAL DX SPEC: NORMAL
PATH REPORT.GROSS SPEC: NORMAL

## 2021-02-15 NOTE — DISCHARGE INSTRUCTIONS
Chest Wall Pain  Introduction  Chest wall pain is pain in or around the bones and muscles of your chest. Sometimes, an injury causes this pain. Sometimes, the cause may not be known. This pain may take several weeks or longer to get better.  Follow these instructions at home:  Pay attention to any changes in your symptoms. Take these actions to help with your pain:  · Rest as told by your doctor.  · Avoid activities that cause pain. Try not to use your chest, belly (abdominal), or side muscles to lift heavy things.  · If directed, apply ice to the painful area:  ¨ Put ice in a plastic bag.  ¨ Place a towel between your skin and the bag.  ¨ Leave the ice on for 20 minutes, 2-3 times per day.  · Take over-the-counter and prescription medicines only as told by your doctor.  · Do not use tobacco products, including cigarettes, chewing tobacco, and e-cigarettes. If you need help quitting, ask your doctor.  · Keep all follow-up visits as told by your doctor. This is important.  Contact a doctor if:  · You have a fever.  · Your chest pain gets worse.  · You have new symptoms.  Get help right away if:  · You feel sick to your stomach (nauseous) or you throw up (vomit).  · You feel sweaty or light-headed.  · You have a cough with phlegm (sputum) or you cough up blood.  · You are short of breath.  This information is not intended to replace advice given to you by your health care provider. Make sure you discuss any questions you have with your health care provider.  Document Released: 06/05/2009 Document Revised: 05/25/2017 Document Reviewed: 03/14/2016  © 2017 [employer]    
The patient is a 58y Female complaining of

## 2021-08-16 ENCOUNTER — TRANSCRIBE ORDERS (OUTPATIENT)
Dept: ADMINISTRATIVE | Facility: HOSPITAL | Age: 61
End: 2021-08-16

## 2021-08-16 DIAGNOSIS — Z12.31 SCREENING MAMMOGRAM FOR HIGH-RISK PATIENT: Primary | ICD-10-CM

## 2021-08-20 ENCOUNTER — APPOINTMENT (OUTPATIENT)
Dept: MAMMOGRAPHY | Facility: HOSPITAL | Age: 61
End: 2021-08-20

## 2021-09-08 ENCOUNTER — TRANSCRIBE ORDERS (OUTPATIENT)
Dept: ADMINISTRATIVE | Facility: HOSPITAL | Age: 61
End: 2021-09-08

## 2021-09-08 ENCOUNTER — LAB (OUTPATIENT)
Dept: LAB | Facility: HOSPITAL | Age: 61
End: 2021-09-08

## 2021-09-08 DIAGNOSIS — E03.9 HYPOTHYROIDISM, UNSPECIFIED TYPE: ICD-10-CM

## 2021-09-08 DIAGNOSIS — E78.00 PURE HYPERCHOLESTEROLEMIA: ICD-10-CM

## 2021-09-08 DIAGNOSIS — I10 ESSENTIAL (PRIMARY) HYPERTENSION: Primary | ICD-10-CM

## 2021-09-08 DIAGNOSIS — I10 ESSENTIAL (PRIMARY) HYPERTENSION: ICD-10-CM

## 2021-09-08 LAB
ALBUMIN SERPL-MCNC: 4.4 G/DL (ref 3.5–5)
ALBUMIN/GLOB SERPL: 1.5 G/DL (ref 1.1–2.5)
ALP SERPL-CCNC: 71 U/L (ref 24–120)
ALT SERPL W P-5'-P-CCNC: 19 U/L (ref 0–35)
ANION GAP SERPL CALCULATED.3IONS-SCNC: 4 MMOL/L (ref 4–13)
AST SERPL-CCNC: 30 U/L (ref 7–45)
AUTO MIXED CELLS #: 0.7 10*3/MM3 (ref 0.1–2.6)
AUTO MIXED CELLS %: 16.1 % (ref 0.1–24)
BILIRUB SERPL-MCNC: 0.3 MG/DL (ref 0.1–1)
BUN SERPL-MCNC: 19 MG/DL (ref 5–21)
BUN/CREAT SERPL: 27.1
CALCIUM SPEC-SCNC: 9.5 MG/DL (ref 8.4–10.4)
CHLORIDE SERPL-SCNC: 103 MMOL/L (ref 98–110)
CHOLEST SERPL-MCNC: 273 MG/DL (ref 130–200)
CO2 SERPL-SCNC: 31 MMOL/L (ref 24–31)
CREAT SERPL-MCNC: 0.7 MG/DL (ref 0.5–1.4)
ERYTHROCYTE [DISTWIDTH] IN BLOOD BY AUTOMATED COUNT: 12.8 % (ref 12.3–15.4)
GFR SERPL CREATININE-BSD FRML MDRD: 86 ML/MIN/1.73
GLOBULIN UR ELPH-MCNC: 2.9 GM/DL
GLUCOSE SERPL-MCNC: 97 MG/DL (ref 70–100)
HCT VFR BLD AUTO: 39.5 % (ref 34–46.6)
HDLC SERPL-MCNC: 89 MG/DL
HGB BLD-MCNC: 13.2 G/DL (ref 12–15.9)
LDLC SERPL CALC-MCNC: 140 MG/DL (ref 0–99)
LDLC/HDLC SERPL: 1.51 {RATIO}
LYMPHOCYTES # BLD AUTO: 1.1 10*3/MM3 (ref 0.7–3.1)
LYMPHOCYTES NFR BLD AUTO: 23.9 % (ref 19.6–45.3)
MCH RBC QN AUTO: 33.2 PG (ref 26.6–33)
MCHC RBC AUTO-ENTMCNC: 33.4 G/DL (ref 31.5–35.7)
MCV RBC AUTO: 99.5 FL (ref 79–97)
NEUTROPHILS NFR BLD AUTO: 2.7 10*3/MM3 (ref 1.7–7)
NEUTROPHILS NFR BLD AUTO: 60 % (ref 42.7–76)
PLATELET # BLD AUTO: 265 10*3/MM3 (ref 140–450)
PMV BLD AUTO: 8.9 FL (ref 6–12)
POTASSIUM SERPL-SCNC: 4.6 MMOL/L (ref 3.5–5.3)
PROT SERPL-MCNC: 7.3 G/DL (ref 6.3–8.7)
RBC # BLD AUTO: 3.97 10*6/MM3 (ref 3.77–5.28)
SODIUM SERPL-SCNC: 138 MMOL/L (ref 135–145)
TRIGL SERPL-MCNC: 250 MG/DL (ref 0–149)
TSH SERPL DL<=0.05 MIU/L-ACNC: 5.86 UIU/ML (ref 0.27–4.2)
VLDLC SERPL-MCNC: 44 MG/DL (ref 5–40)
WBC # BLD AUTO: 4.5 10*3/MM3 (ref 3.4–10.8)

## 2021-09-08 PROCEDURE — 80061 LIPID PANEL: CPT

## 2021-09-08 PROCEDURE — 36415 COLL VENOUS BLD VENIPUNCTURE: CPT

## 2021-09-08 PROCEDURE — 80053 COMPREHEN METABOLIC PANEL: CPT

## 2021-09-08 PROCEDURE — 84443 ASSAY THYROID STIM HORMONE: CPT

## 2021-09-08 PROCEDURE — 85025 COMPLETE CBC W/AUTO DIFF WBC: CPT

## 2021-09-22 ENCOUNTER — APPOINTMENT (OUTPATIENT)
Dept: MAMMOGRAPHY | Facility: HOSPITAL | Age: 61
End: 2021-09-22

## 2021-09-24 ENCOUNTER — HOSPITAL ENCOUNTER (OUTPATIENT)
Dept: MAMMOGRAPHY | Facility: HOSPITAL | Age: 61
Discharge: HOME | End: 2021-09-24
Admitting: FAMILY MEDICINE

## 2021-09-24 PROCEDURE — 77063 BREAST TOMOSYNTHESIS BI: CPT

## 2021-09-24 PROCEDURE — 77067 SCR MAMMO BI INCL CAD: CPT

## 2022-03-10 ENCOUNTER — TRANSCRIBE ORDERS (OUTPATIENT)
Dept: ADMINISTRATIVE | Facility: HOSPITAL | Age: 62
End: 2022-03-10

## 2022-03-10 DIAGNOSIS — R07.9 CHEST PAIN, UNSPECIFIED TYPE: Primary | ICD-10-CM

## 2022-03-10 DIAGNOSIS — I10 ESSENTIAL HYPERTENSION: ICD-10-CM

## 2022-03-21 ENCOUNTER — HOSPITAL ENCOUNTER (OUTPATIENT)
Dept: CARDIOLOGY | Facility: HOSPITAL | Age: 62
Discharge: HOME | End: 2022-03-21
Admitting: FAMILY MEDICINE

## 2022-03-21 VITALS
SYSTOLIC BLOOD PRESSURE: 153 MMHG | BODY MASS INDEX: 29.23 KG/M2 | DIASTOLIC BLOOD PRESSURE: 78 MMHG | HEIGHT: 63 IN | WEIGHT: 165 LBS | HEART RATE: 65 BPM

## 2022-03-21 DIAGNOSIS — I10 ESSENTIAL HYPERTENSION: ICD-10-CM

## 2022-03-21 DIAGNOSIS — R07.9 CHEST PAIN, UNSPECIFIED TYPE: ICD-10-CM

## 2022-03-21 PROCEDURE — 93018 CV STRESS TEST I&R ONLY: CPT | Performed by: INTERNAL MEDICINE

## 2022-03-21 PROCEDURE — 93017 CV STRESS TEST TRACING ONLY: CPT

## 2022-03-23 LAB
BH CV STRESS BP STAGE 1: NORMAL
BH CV STRESS BP STAGE 2: NORMAL
BH CV STRESS BP STAGE 3: NORMAL
BH CV STRESS DURATION MIN STAGE 1: 3
BH CV STRESS DURATION MIN STAGE 2: 3
BH CV STRESS DURATION MIN STAGE 3: 1
BH CV STRESS DURATION SEC STAGE 1: 0
BH CV STRESS DURATION SEC STAGE 2: 0
BH CV STRESS DURATION SEC STAGE 3: 9
BH CV STRESS GRADE STAGE 1: 10
BH CV STRESS GRADE STAGE 2: 12
BH CV STRESS GRADE STAGE 3: 14
BH CV STRESS HR STAGE 1: 94
BH CV STRESS HR STAGE 2: 109
BH CV STRESS HR STAGE 3: 118
BH CV STRESS METS STAGE 1: 5
BH CV STRESS METS STAGE 2: 7.5
BH CV STRESS METS STAGE 3: 10
BH CV STRESS PROTOCOL 1: NORMAL
BH CV STRESS RECOVERY BP: NORMAL MMHG
BH CV STRESS RECOVERY HR: 76 BPM
BH CV STRESS SPEED STAGE 1: 1.7
BH CV STRESS SPEED STAGE 2: 2.5
BH CV STRESS SPEED STAGE 3: 3.4
BH CV STRESS STAGE 1: 1
BH CV STRESS STAGE 2: 2
BH CV STRESS STAGE 3: 3
MAXIMAL PREDICTED HEART RATE: 161 BPM
PERCENT MAX PREDICTED HR: 73.29 %
STRESS BASELINE BP: NORMAL MMHG
STRESS BASELINE HR: 65 BPM
STRESS PERCENT HR: 86 %
STRESS POST ESTIMATED WORKLOAD: 10 METS
STRESS POST EXERCISE DUR MIN: 7 MIN
STRESS POST EXERCISE DUR SEC: 9 SEC
STRESS POST PEAK BP: NORMAL MMHG
STRESS POST PEAK HR: 118 BPM
STRESS TARGET HR: 137 BPM

## 2022-03-31 ENCOUNTER — TRANSCRIBE ORDERS (OUTPATIENT)
Dept: ADMINISTRATIVE | Facility: HOSPITAL | Age: 62
End: 2022-03-31

## 2022-03-31 DIAGNOSIS — R07.9 CHEST PAIN, UNSPECIFIED TYPE: Primary | ICD-10-CM

## 2022-03-31 DIAGNOSIS — I10 HYPERTENSION, ESSENTIAL: ICD-10-CM

## 2022-04-08 ENCOUNTER — HOSPITAL ENCOUNTER (OUTPATIENT)
Dept: CARDIOLOGY | Facility: HOSPITAL | Age: 62
Discharge: HOME | End: 2022-04-08
Admitting: FAMILY MEDICINE

## 2022-04-08 VITALS
HEIGHT: 63 IN | WEIGHT: 165 LBS | HEART RATE: 75 BPM | SYSTOLIC BLOOD PRESSURE: 153 MMHG | BODY MASS INDEX: 29.23 KG/M2 | DIASTOLIC BLOOD PRESSURE: 81 MMHG

## 2022-04-08 DIAGNOSIS — R07.9 CHEST PAIN, UNSPECIFIED TYPE: ICD-10-CM

## 2022-04-08 DIAGNOSIS — I10 HYPERTENSION, ESSENTIAL: ICD-10-CM

## 2022-04-08 PROCEDURE — 0 DOBUTAMINE PER 250 MG: Performed by: INTERNAL MEDICINE

## 2022-04-08 PROCEDURE — 93350 STRESS TTE ONLY: CPT | Performed by: INTERNAL MEDICINE

## 2022-04-08 PROCEDURE — 93350 STRESS TTE ONLY: CPT

## 2022-04-08 PROCEDURE — 25010000002 PERFLUTREN 6.52 MG/ML SUSPENSION: Performed by: INTERNAL MEDICINE

## 2022-04-08 PROCEDURE — 93017 CV STRESS TEST TRACING ONLY: CPT

## 2022-04-08 PROCEDURE — 93018 CV STRESS TEST I&R ONLY: CPT | Performed by: INTERNAL MEDICINE

## 2022-04-08 PROCEDURE — 93352 ADMIN ECG CONTRAST AGENT: CPT | Performed by: INTERNAL MEDICINE

## 2022-04-08 RX ORDER — DOBUTAMINE HYDROCHLORIDE 100 MG/100ML
10-50 INJECTION INTRAVENOUS CONTINUOUS
Status: DISCONTINUED | OUTPATIENT
Start: 2022-04-08 | End: 2022-04-09 | Stop reason: HOSPADM

## 2022-04-08 RX ADMIN — Medication 10 MCG/KG/MIN: at 11:59

## 2022-04-08 RX ADMIN — PERFLUTREN 8.48 MG: 6.52 INJECTION, SUSPENSION INTRAVENOUS at 12:00

## 2022-04-12 LAB
BH CV STRESS BP STAGE 1: NORMAL
BH CV STRESS BP STAGE 2: NORMAL
BH CV STRESS BP STAGE 3: NORMAL
BH CV STRESS BP STAGE 4: NORMAL
BH CV STRESS BP STAGE 5: NORMAL
BH CV STRESS DOSE DOBUTAMINE STAGE 1: 10
BH CV STRESS DOSE DOBUTAMINE STAGE 2: 20
BH CV STRESS DOSE DOBUTAMINE STAGE 3: 30
BH CV STRESS DOSE DOBUTAMINE STAGE 4: 40
BH CV STRESS DOSE DOBUTAMINE STAGE 5: 50
BH CV STRESS DURATION MIN STAGE 1: 3
BH CV STRESS DURATION MIN STAGE 2: 3
BH CV STRESS DURATION MIN STAGE 3: 3
BH CV STRESS DURATION MIN STAGE 4: 3
BH CV STRESS DURATION MIN STAGE 5: 2
BH CV STRESS DURATION SEC STAGE 1: 0
BH CV STRESS DURATION SEC STAGE 2: 0
BH CV STRESS DURATION SEC STAGE 3: 0
BH CV STRESS DURATION SEC STAGE 4: 0
BH CV STRESS DURATION SEC STAGE 5: 42
BH CV STRESS HR STAGE 1: 87
BH CV STRESS HR STAGE 2: 96
BH CV STRESS HR STAGE 3: 108
BH CV STRESS HR STAGE 4: 116
BH CV STRESS HR STAGE 5: 131
BH CV STRESS PROTOCOL 1: NORMAL
BH CV STRESS RECOVERY BP: NORMAL MMHG
BH CV STRESS RECOVERY HR: 97 BPM
BH CV STRESS STAGE 1: 1
BH CV STRESS STAGE 2: 2
BH CV STRESS STAGE 3: 3
BH CV STRESS STAGE 4: 4
BH CV STRESS STAGE 5: 5
MAXIMAL PREDICTED HEART RATE: 160 BPM
PERCENT MAX PREDICTED HR: 81.88 %
STRESS BASELINE BP: NORMAL MMHG
STRESS BASELINE HR: 75 BPM
STRESS PERCENT HR: 96 %
STRESS POST EXERCISE DUR MIN: 14 MIN
STRESS POST EXERCISE DUR SEC: 42 SEC
STRESS POST PEAK BP: NORMAL MMHG
STRESS POST PEAK HR: 131 BPM
STRESS TARGET HR: 136 BPM

## 2022-04-18 ENCOUNTER — APPOINTMENT (OUTPATIENT)
Dept: CT IMAGING | Facility: HOSPITAL | Age: 62
End: 2022-04-18

## 2022-04-18 ENCOUNTER — APPOINTMENT (OUTPATIENT)
Dept: GENERAL RADIOLOGY | Facility: HOSPITAL | Age: 62
End: 2022-04-18

## 2022-04-18 ENCOUNTER — HOSPITAL ENCOUNTER (EMERGENCY)
Facility: HOSPITAL | Age: 62
Discharge: HOME | End: 2022-04-18
Admitting: EMERGENCY MEDICINE

## 2022-04-18 VITALS
DIASTOLIC BLOOD PRESSURE: 79 MMHG | SYSTOLIC BLOOD PRESSURE: 164 MMHG | HEIGHT: 63 IN | BODY MASS INDEX: 27.64 KG/M2 | WEIGHT: 156 LBS | RESPIRATION RATE: 14 BRPM | OXYGEN SATURATION: 98 % | HEART RATE: 84 BPM | TEMPERATURE: 97.9 F

## 2022-04-18 DIAGNOSIS — R10.9 LEFT FLANK PAIN: Primary | ICD-10-CM

## 2022-04-18 LAB
ALBUMIN SERPL-MCNC: 4.1 G/DL (ref 3.5–5.2)
ALBUMIN/GLOB SERPL: 1.5 G/DL
ALP SERPL-CCNC: 89 U/L (ref 39–117)
ALT SERPL W P-5'-P-CCNC: 18 U/L (ref 1–33)
ANION GAP SERPL CALCULATED.3IONS-SCNC: 11 MMOL/L (ref 5–15)
AST SERPL-CCNC: 22 U/L (ref 1–32)
BASOPHILS # BLD AUTO: 0.04 10*3/MM3 (ref 0–0.2)
BASOPHILS NFR BLD AUTO: 0.4 % (ref 0–1.5)
BILIRUB SERPL-MCNC: 0.3 MG/DL (ref 0–1.2)
BILIRUB UR QL STRIP: NEGATIVE
BUN SERPL-MCNC: 14 MG/DL (ref 8–23)
BUN/CREAT SERPL: 16.3 (ref 7–25)
CALCIUM SPEC-SCNC: 9.4 MG/DL (ref 8.6–10.5)
CHLORIDE SERPL-SCNC: 101 MMOL/L (ref 98–107)
CLARITY UR: CLEAR
CO2 SERPL-SCNC: 27 MMOL/L (ref 22–29)
COLOR UR: YELLOW
CREAT SERPL-MCNC: 0.86 MG/DL (ref 0.57–1)
D-LACTATE SERPL-SCNC: 0.7 MMOL/L (ref 0.5–2)
DEPRECATED RDW RBC AUTO: 44.3 FL (ref 37–54)
EGFRCR SERPLBLD CKD-EPI 2021: 77.5 ML/MIN/1.73
EOSINOPHIL # BLD AUTO: 0.31 10*3/MM3 (ref 0–0.4)
EOSINOPHIL NFR BLD AUTO: 2.7 % (ref 0.3–6.2)
ERYTHROCYTE [DISTWIDTH] IN BLOOD BY AUTOMATED COUNT: 12.5 % (ref 12.3–15.4)
FLUAV RNA RESP QL NAA+PROBE: NOT DETECTED
FLUBV RNA RESP QL NAA+PROBE: NOT DETECTED
GLOBULIN UR ELPH-MCNC: 2.8 GM/DL
GLUCOSE SERPL-MCNC: 95 MG/DL (ref 65–99)
GLUCOSE UR STRIP-MCNC: NEGATIVE MG/DL
HCT VFR BLD AUTO: 39.1 % (ref 34–46.6)
HGB BLD-MCNC: 13.3 G/DL (ref 12–15.9)
HGB UR QL STRIP.AUTO: NEGATIVE
IMM GRANULOCYTES # BLD AUTO: 0.04 10*3/MM3 (ref 0–0.05)
IMM GRANULOCYTES NFR BLD AUTO: 0.4 % (ref 0–0.5)
KETONES UR QL STRIP: NEGATIVE
LEUKOCYTE ESTERASE UR QL STRIP.AUTO: NEGATIVE
LIPASE SERPL-CCNC: 34 U/L (ref 13–60)
LYMPHOCYTES # BLD AUTO: 1.77 10*3/MM3 (ref 0.7–3.1)
LYMPHOCYTES NFR BLD AUTO: 15.6 % (ref 19.6–45.3)
MAGNESIUM SERPL-MCNC: 1.7 MG/DL (ref 1.6–2.4)
MCH RBC QN AUTO: 32.5 PG (ref 26.6–33)
MCHC RBC AUTO-ENTMCNC: 34 G/DL (ref 31.5–35.7)
MCV RBC AUTO: 95.6 FL (ref 79–97)
MONOCYTES # BLD AUTO: 1.51 10*3/MM3 (ref 0.1–0.9)
MONOCYTES NFR BLD AUTO: 13.3 % (ref 5–12)
NEUTROPHILS NFR BLD AUTO: 67.6 % (ref 42.7–76)
NEUTROPHILS NFR BLD AUTO: 7.69 10*3/MM3 (ref 1.7–7)
NITRITE UR QL STRIP: NEGATIVE
NRBC BLD AUTO-RTO: 0 /100 WBC (ref 0–0.2)
PH UR STRIP.AUTO: 7 [PH] (ref 5–8)
PLATELET # BLD AUTO: 328 10*3/MM3 (ref 140–450)
PMV BLD AUTO: 9.3 FL (ref 6–12)
POTASSIUM SERPL-SCNC: 4 MMOL/L (ref 3.5–5.2)
PROCALCITONIN SERPL-MCNC: 0.07 NG/ML (ref 0–0.25)
PROT SERPL-MCNC: 6.9 G/DL (ref 6–8.5)
PROT UR QL STRIP: NEGATIVE
RBC # BLD AUTO: 4.09 10*6/MM3 (ref 3.77–5.28)
SARS-COV-2 RNA RESP QL NAA+PROBE: NOT DETECTED
SODIUM SERPL-SCNC: 139 MMOL/L (ref 136–145)
SP GR UR STRIP: 1.02 (ref 1–1.03)
UROBILINOGEN UR QL STRIP: NORMAL
WBC NRBC COR # BLD: 11.36 10*3/MM3 (ref 3.4–10.8)

## 2022-04-18 PROCEDURE — 87636 SARSCOV2 & INF A&B AMP PRB: CPT | Performed by: PHYSICIAN ASSISTANT

## 2022-04-18 PROCEDURE — 87040 BLOOD CULTURE FOR BACTERIA: CPT | Performed by: PHYSICIAN ASSISTANT

## 2022-04-18 PROCEDURE — 83605 ASSAY OF LACTIC ACID: CPT | Performed by: PHYSICIAN ASSISTANT

## 2022-04-18 PROCEDURE — 84145 PROCALCITONIN (PCT): CPT | Performed by: PHYSICIAN ASSISTANT

## 2022-04-18 PROCEDURE — 83735 ASSAY OF MAGNESIUM: CPT | Performed by: PHYSICIAN ASSISTANT

## 2022-04-18 PROCEDURE — 83690 ASSAY OF LIPASE: CPT | Performed by: PHYSICIAN ASSISTANT

## 2022-04-18 PROCEDURE — 93005 ELECTROCARDIOGRAM TRACING: CPT

## 2022-04-18 PROCEDURE — 80053 COMPREHEN METABOLIC PANEL: CPT | Performed by: PHYSICIAN ASSISTANT

## 2022-04-18 PROCEDURE — 0 HYDROMORPHONE 1 MG/ML SOLUTION: Performed by: FAMILY MEDICINE

## 2022-04-18 PROCEDURE — 96375 TX/PRO/DX INJ NEW DRUG ADDON: CPT

## 2022-04-18 PROCEDURE — 25010000002 DROPERIDOL PER 5 MG: Performed by: FAMILY MEDICINE

## 2022-04-18 PROCEDURE — 93010 ELECTROCARDIOGRAM REPORT: CPT | Performed by: EMERGENCY MEDICINE

## 2022-04-18 PROCEDURE — 36415 COLL VENOUS BLD VENIPUNCTURE: CPT

## 2022-04-18 PROCEDURE — 81003 URINALYSIS AUTO W/O SCOPE: CPT | Performed by: PHYSICIAN ASSISTANT

## 2022-04-18 PROCEDURE — 85025 COMPLETE CBC W/AUTO DIFF WBC: CPT | Performed by: PHYSICIAN ASSISTANT

## 2022-04-18 PROCEDURE — 99284 EMERGENCY DEPT VISIT MOD MDM: CPT

## 2022-04-18 PROCEDURE — 96374 THER/PROPH/DIAG INJ IV PUSH: CPT

## 2022-04-18 PROCEDURE — 74018 RADEX ABDOMEN 1 VIEW: CPT

## 2022-04-18 RX ORDER — SODIUM CHLORIDE 0.9 % (FLUSH) 0.9 %
10 SYRINGE (ML) INJECTION AS NEEDED
Status: DISCONTINUED | OUTPATIENT
Start: 2022-04-18 | End: 2022-04-18 | Stop reason: HOSPADM

## 2022-04-18 RX ORDER — DICYCLOMINE HCL 20 MG
20 TABLET ORAL EVERY 6 HOURS PRN
Qty: 12 TABLET | Refills: 0 | Status: SHIPPED | OUTPATIENT
Start: 2022-04-18 | End: 2022-10-17

## 2022-04-18 RX ORDER — KETOROLAC TROMETHAMINE 10 MG/1
10 TABLET, FILM COATED ORAL EVERY 6 HOURS PRN
Qty: 12 TABLET | Refills: 0 | Status: SHIPPED | OUTPATIENT
Start: 2022-04-18 | End: 2022-04-26

## 2022-04-18 RX ORDER — DROPERIDOL 2.5 MG/ML
2.5 INJECTION, SOLUTION INTRAMUSCULAR; INTRAVENOUS ONCE
Status: COMPLETED | OUTPATIENT
Start: 2022-04-18 | End: 2022-04-18

## 2022-04-18 RX ORDER — METOCLOPRAMIDE 5 MG/1
5 TABLET ORAL 3 TIMES DAILY PRN
Qty: 12 TABLET | Refills: 0 | Status: SHIPPED | OUTPATIENT
Start: 2022-04-18 | End: 2022-04-26

## 2022-04-18 RX ADMIN — SODIUM CHLORIDE, POTASSIUM CHLORIDE, SODIUM LACTATE AND CALCIUM CHLORIDE 1000 ML: 600; 310; 30; 20 INJECTION, SOLUTION INTRAVENOUS at 16:10

## 2022-04-18 RX ADMIN — DROPERIDOL 2.5 MG: 2.5 INJECTION, SOLUTION INTRAMUSCULAR; INTRAVENOUS at 16:07

## 2022-04-18 RX ADMIN — HYDROMORPHONE HYDROCHLORIDE 0.5 MG: 1 INJECTION, SOLUTION INTRAMUSCULAR; INTRAVENOUS; SUBCUTANEOUS at 16:38

## 2022-04-18 NOTE — ED PROVIDER NOTES
Subjective   History of Present Illness    Patient is a 62-year-old female presenting to ED with flank pain and diarrhea.  PMH significant for MS, hypertension, hypothyroidism, hysterectomy, appendectomy, cholecystectomy, oophorectomy.  Patient states she is recently been following up outpatient with cardiology for management of chest pain and had been feeling relatively well until last night she developed sudden onset diarrhea as well as flank pain on the left side.  Patient states that all day today the flank pain has significantly worsened on her left and is now radiating towards her left lower quadrant she describes it comes and significant increase in waves.  Patient is continuing to have diarrhea with urinary urgency, urinary frequency, urinary hesitancy, dysuria but denies any hematuria.  Patient states she started to become nauseous at which time she had in route to the ED and began vomiting upon arrival.  Patient reports subjective fevers with hot flashes.  Patient denies any known recent sick contact.  Patient does states she has a history of kidney stones which required lithotripsy however she cannot remember if they felt this way as they were so long ago.  Patient denies any medication use for her symptoms and did note that she took one of her daily Norco this morning at 8 AM which did not alleviate the pain.    Records reviewed show patient last seen in the ED on 1/8/2020 for contusion of multiple sites of left lower extremity.    Patient has been following up outpatient with cardiology with a stress echo on 4/8/2022 for chest pain and hypertension.  Stress echo showed: Low risk stress test, all LV wall segments demonstrate appropriate increase in contractility with dobutamine infusion.    Review of Systems   Constitutional: Positive for diaphoresis and fever (Subjective). Negative for chills.   HENT: Negative.    Eyes: Negative.    Respiratory: Negative.    Cardiovascular: Negative.     Gastrointestinal: Positive for abdominal pain (LLQ), diarrhea, nausea and vomiting.   Genitourinary: Positive for dysuria, flank pain (Left), frequency and urgency. Negative for hematuria.        Reports + urinary hesitancy   Skin: Negative.    Neurological: Negative.    Psychiatric/Behavioral: Negative.    All other systems reviewed and are negative.      Past Medical History:   Diagnosis Date   • Disease of thyroid gland    • Glaucoma    • Glaucoma    • Hypertension    • MS (multiple sclerosis) (HCC)        Allergies   Allergen Reactions   • Morphine Irritability     Patient states she gets mean.   • Quinolones Other (See Comments)     Per [facility] pt is allergic   • Zofran [Ondansetron Hcl] Itching   • Cephalosporins Rash and Other (See Comments)     Per [facility] pt is allergic   • Ciprofloxacin Rash   • Erythromycin Rash   • Macrolides And Ketolides Rash and Other (See Comments)     Per [facility] pt has allergy   • Penicillins Rash   • Sulfa Antibiotics Rash   • Thorazine [Chlorpromazine] Anxiety       Past Surgical History:   Procedure Laterality Date   • APPENDECTOMY     •  SECTION     • CHOLECYSTECTOMY     • COLONOSCOPY N/A 2020    Procedure: COLONOSCOPY WITH ANESTHESIA;  Surgeon: Eduard Taylor MD;  Location: [facility] ENDOSCOPY;  Service: Gastroenterology;  Laterality: N/A;  pre: screen  post: normal   Jose Ramon Agarwal MD   • ELBOW ARTHROSCOPY Left 2019   • ENDOSCOPY N/A 2020    Procedure: ESOPHAGOGASTRODUODENOSCOPY WITH ANESTHESIA;  Surgeon: Eduard Taylor MD;  Location: [facility] ENDOSCOPY;  Service: Gastroenterology;  Laterality: N/A;  pre: Pharyngoesophageal dysphagia  post: esophagitis   Jose Ramon Agarwal MD   • HAND RECONSTRUCTION Right    • HYSTERECTOMY     • OOPHORECTOMY     • REPLACEMENT TOTAL KNEE Left    • TONSILLECTOMY         Family History   Problem Relation Age of Onset   • Breast cancer Maternal Aunt    • Heart failure Mother    • Heart disease Father     • Heart attack Father    • No Known Problems Sister    • No Known Problems Brother    • No Known Problems Daughter    • No Known Problems Son    • No Known Problems Maternal Grandmother    • No Known Problems Paternal Grandmother    • No Known Problems Paternal Aunt    • No Known Problems Other    • BRCA 1/2 Neg Hx    • Colon cancer Neg Hx    • Endometrial cancer Neg Hx    • Ovarian cancer Neg Hx    • Colon polyps Neg Hx        Social History     Socioeconomic History   • Marital status:    Tobacco Use   • Smoking status: Former Smoker   • Smokeless tobacco: Never Used   Vaping Use   • Vaping Use: Never used   Substance and Sexual Activity   • Alcohol use: Yes     Comment: occ   • Drug use: No   • Sexual activity: Defer           Objective   Physical Exam  Vitals and nursing note reviewed.   Constitutional:       General: She is in acute distress (appears uncomfortable due to pain).      Appearance: Normal appearance. She is well-developed, well-groomed and normal weight. She is not toxic-appearing or diaphoretic.   HENT:      Head: Normocephalic.      Mouth/Throat:      Mouth: Mucous membranes are moist.      Pharynx: Oropharynx is clear.   Eyes:      General: No scleral icterus.     Conjunctiva/sclera: Conjunctivae normal.      Pupils: Pupils are equal, round, and reactive to light.   Cardiovascular:      Rate and Rhythm: Normal rate and regular rhythm.   Pulmonary:      Effort: Pulmonary effort is normal.      Breath sounds: Normal breath sounds.   Abdominal:      General: Bowel sounds are normal.      Palpations: Abdomen is soft.      Tenderness: There is abdominal tenderness (LLQ). There is left CVA tenderness. There is no right CVA tenderness.   Musculoskeletal:         General: Normal range of motion.      Cervical back: Normal range of motion and neck supple.      Right lower leg: No edema.      Left lower leg: No edema.   Skin:     General: Skin is warm and dry.      Coloration: Skin is not  jaundiced.   Neurological:      Mental Status: She is alert and oriented to person, place, and time.      Gait: Gait normal.   Psychiatric:         Attention and Perception: Attention normal.         Mood and Affect: Affect normal. Mood is anxious.         Speech: Speech normal.         Behavior: Behavior normal. Behavior is cooperative.         Procedures           ED Course  ED Course as of 04/18/22 2001 Mon Apr 18, 2022   1800 Called to bedside at this time.  Patient expressed frustration and delayed length of stay.  Discussed that we are having difficulty obtaining labs.  Patient inquiring on why labs are required prior to imaging and discussed need to evaluate patient's renal function prior to ordering a CT.  After further discussion patient states that she is very adamant she will not be having a CT as she did not realize she would have to have her head and closed and she cannot tolerate this with her claustrophobia.  Discussed with patient importance of obtaining imaging to rule out life-threatening causes of her symptoms.  Patient states that she believes this is a kidney stone however discussed with patient that at this time with her urine all studies show no evidence of infection or hematuria there is concern for other causes of her pain.  Offered patient medication such as anxiolytics and she is able to verbalize risk of is, alternatives however declines the CT imaging.  Patient is amenable to an abdomen KUB x-ray.  Discussed that this may be inconclusive and not provide the answer for her symptoms however patient wishes to proceed with this first.  Offered patient additional medications for pain, discomfort, or antiemetics as she feels her pain is only been mildly improved however patient declines these medications at this time. [JS]   1947 Upon reevaluation at this time patient stated she is still in pain but continues to decline any further medications for pain, discomfort, or nausea.  Reviewed with  patient remainder of lab and imaging findings discussed and reassuring work-up with no evidence of infection, no electrolyte abnormalities, normal renal hepatic functions, and reviewed findings on x-ray.  Discussed that at this time her right stone is not causing problems however this will need to be followed by urology should it pass.  Discussed continued recommendation for CT imaging to rule out life-threatening or emergent causes of her pain.  Discussed risk, benefits, and alternatives.  Patient continues to decline any further imaging stating that she is ready to go home at this time.  Patient is able to verbalize risk, benefits, alternatives.  Discussed return precautions need for immediate return to ED should she develop any new or worsening symptoms.  Patient is able to ambulate baseline without difficulty, urinate without difficulty, tolerate p.o. fluids.  Patient will follow up with her primary care provider tomorrow.  Patient offered continued evaluation again and continues to decline.  Patient with no further questions concerns, needs and is stable for discharge. [JS]      ED Course User Index  [JS] Sergo Castillo PA-C                                                 MDM  Number of Diagnoses or Management Options     Amount and/or Complexity of Data Reviewed  Clinical lab tests: ordered and reviewed  Tests in the radiology section of CPT®: reviewed and ordered  Tests in the medicine section of CPT®: reviewed and ordered  Decide to obtain previous medical records or to obtain history from someone other than the patient: yes  Obtain history from someone other than the patient: yes ()  Review and summarize past medical records: yes  Discuss the patient with other providers: yes (Dr. Norberto Eden (attending))        Final diagnoses:   Left flank pain       ED Disposition  ED Disposition     ED Disposition   Discharge    Condition   Stable    Comment   --             Jose Ramon Agarwal MD  [id number]  [facility] [facility] [address]  [facility] [address]  [phone]    Schedule an appointment as soon as possible for a visit in 2 day(s)      [facility] Emergency Department  [facility] [facility] [address] [phone]  Follow up  As needed         Medication List      New Prescriptions    dicyclomine 20 MG tablet  Commonly known as: BENTYL  Take 1 tablet by mouth Every 6 (Six) Hours As Needed (abdominal cramping).     ketorolac 10 MG tablet  Commonly known as: TORADOL  Take 1 tablet by mouth Every 6 (Six) Hours As Needed for Mild Pain .     metoclopramide 5 MG tablet  Commonly known as: REGLAN  Take 1 tablet by mouth 3 (Three) Times a Day As Needed (nausea).           Where to Get Your Medications      These medications were sent to [facility] [address] [place] [address] [address] [facility] - [phone]  - [phone] [address] [address]    Phone: [phone]   · dicyclomine 20 MG tablet  · ketorolac 10 MG tablet  · metoclopramide 5 MG tablet          Sergo Castillo PA-C  04/18/22 2001

## 2022-04-19 LAB
QT INTERVAL: 386 MS
QTC INTERVAL: 450 MS

## 2022-04-19 NOTE — DISCHARGE INSTRUCTIONS
It is very important as we discussed that you continue to follow-up with your primary care provider for further evaluation.  They may also recommend a CT.  Please have a bland diet, increase hydration with water, use nausea and anti-inflammatory medications as needed.  Should you develop any new or worsening symptoms please return to the ER for further evaluation.

## 2022-04-21 ENCOUNTER — TRANSCRIBE ORDERS (OUTPATIENT)
Dept: ADMINISTRATIVE | Facility: HOSPITAL | Age: 62
End: 2022-04-21

## 2022-04-21 DIAGNOSIS — N20.0 KIDNEY STONE: ICD-10-CM

## 2022-04-21 DIAGNOSIS — R93.429 ABNORMAL FINDING ON DIAGNOSTIC IMAGING OF KIDNEY: Primary | ICD-10-CM

## 2022-04-23 LAB
BACTERIA SPEC AEROBE CULT: NORMAL
BACTERIA SPEC AEROBE CULT: NORMAL

## 2022-04-25 ENCOUNTER — HOSPITAL ENCOUNTER (OUTPATIENT)
Dept: CT IMAGING | Facility: HOSPITAL | Age: 62
Discharge: HOME | End: 2022-04-25
Admitting: FAMILY MEDICINE

## 2022-04-25 DIAGNOSIS — R93.429 ABNORMAL FINDING ON DIAGNOSTIC IMAGING OF KIDNEY: ICD-10-CM

## 2022-04-25 DIAGNOSIS — N20.0 KIDNEY STONE: ICD-10-CM

## 2022-04-25 PROCEDURE — 74176 CT ABD & PELVIS W/O CONTRAST: CPT

## 2022-04-26 ENCOUNTER — OFFICE VISIT (OUTPATIENT)
Dept: UROLOGY | Facility: CLINIC | Age: 62
End: 2022-04-26

## 2022-04-26 ENCOUNTER — HOSPITAL ENCOUNTER (OUTPATIENT)
Dept: GENERAL RADIOLOGY | Facility: HOSPITAL | Age: 62
Discharge: HOME | End: 2022-04-26

## 2022-04-26 VITALS — HEIGHT: 63 IN | BODY MASS INDEX: 27.64 KG/M2 | WEIGHT: 156 LBS

## 2022-04-26 DIAGNOSIS — R10.2 SUPRAPUBIC PAIN: Primary | ICD-10-CM

## 2022-04-26 DIAGNOSIS — N20.0 KIDNEY STONES: ICD-10-CM

## 2022-04-26 LAB
BILIRUB BLD-MCNC: NEGATIVE MG/DL
CLARITY, POC: CLEAR
COLOR UR: YELLOW
GLUCOSE UR STRIP-MCNC: NEGATIVE MG/DL
KETONES UR QL: NEGATIVE
LEUKOCYTE EST, POC: NEGATIVE
NITRITE UR-MCNC: NEGATIVE MG/ML
PH UR: 6 [PH] (ref 5–8)
PROT UR STRIP-MCNC: NEGATIVE MG/DL
RBC # UR STRIP: NEGATIVE /UL
SP GR UR: 1.02 (ref 1–1.03)
UROBILINOGEN UR QL: NORMAL

## 2022-04-26 PROCEDURE — 81001 URINALYSIS AUTO W/SCOPE: CPT

## 2022-04-26 PROCEDURE — 74018 RADEX ABDOMEN 1 VIEW: CPT

## 2022-04-26 PROCEDURE — 99213 OFFICE O/P EST LOW 20 MIN: CPT

## 2022-04-27 NOTE — PROGRESS NOTES
Subjective    Ms. Medley is 62 y.o. female    Chief Complaint: Suprapubic pain    History of Present Illness     62-year-old female new patient referred for kidney stones found on CT of the abdomen and pelvis from recent ER visit on 4/18/2022 due to left flank and suprapubic pain.  Patient CT of the abdomen and pelvis from 4/25/2022 showing a 10 mm nonobstructing posterior midpole right kidney stone as well as 2 stones in the left kidney a 3 mm stone in the upper pole of the left kidney and a 2 mm stone in the lower pole of the left kidney.  No ureteral stones visualized.  Patient presents at this time still having suprapubic pain although the left-sided flank pain has improved.  Patient states it feels like pressure and at times has shooting pain.  Patient denies any passage of blood or urinary frequency or urgency.  Patient's KUB today showing the same without evidence of any ureteral stones.  There are a few small pelvic phleboliths noted.  Patient denies any fever or chills.  No difficulty with urination or feelings of incomplete emptying. Patient reports has a history of 1 prior stone episode in the past for which she had to have a ureteroscopy with stent placement many many years ago.     The following portions of the patient's history were reviewed and updated as appropriate: allergies, current medications, past family history, past medical history, past social history, past surgical history and problem list.    Review of Systems   Constitutional: Negative for chills, fatigue and fever.   Gastrointestinal: Negative for nausea and vomiting.   Genitourinary: Positive for flank pain and pelvic pain. Negative for decreased urine volume, difficulty urinating, dysuria, frequency, hematuria and urgency.         Current Outpatient Medications:   •  acetaminophen (TYLENOL) 325 MG tablet, Take 300 mg by mouth 3 (Three) Times a Day., Disp: , Rfl:   •  amLODIPine (NORVASC) 5 MG tablet, Take 5 mg by mouth Daily., Disp:  , Rfl:   •  brimonidine (ALPHAGAN) 0.2 % ophthalmic solution, INSTILL 1 DROP BOTH EYES TWICE A DAY AS DIRECTED, Disp: , Rfl:   •  diazePAM (VALIUM) 2 MG tablet, , Disp: , Rfl:   •  diclofenac sodium (VOTAREN XR) 100 MG 24 hr tablet, Take 100 mg by mouth Daily., Disp: , Rfl:   •  dicyclomine (BENTYL) 20 MG tablet, Take 1 tablet by mouth Every 6 (Six) Hours As Needed (abdominal cramping)., Disp: 12 tablet, Rfl: 0  •  estradiol (ESTRACE) 2 MG tablet, Take 2 mg by mouth Daily., Disp: , Rfl:   •  fingolimod (GILENYA) 0.5 MG capsule, Take 0.5 mg by mouth Daily., Disp: , Rfl:   •  gabapentin (NEURONTIN) 800 MG tablet, Take 900 mg by mouth 3 (Three) Times a Day., Disp: , Rfl:   •  HYDROcodone-acetaminophen (NORCO) 5-325 MG per tablet, Take 1 tablet by mouth 3 (Three) Times a Day., Disp: , Rfl:   •  levothyroxine (SYNTHROID, LEVOTHROID) 125 MCG tablet, Take 125 mcg by mouth Daily., Disp: , Rfl:   •  traZODone (DESYREL) 150 MG tablet, Take 300 mg by mouth Every Night., Disp: , Rfl:     Past Medical History:   Diagnosis Date   • Disease of thyroid gland    • Glaucoma    • Glaucoma    • Hypertension    • MS (multiple sclerosis) (HCC)        Past Surgical History:   Procedure Laterality Date   • APPENDECTOMY     •  SECTION     • CHOLECYSTECTOMY     • COLONOSCOPY N/A 2020    Procedure: COLONOSCOPY WITH ANESTHESIA;  Surgeon: Eduard Taylor MD;  Location: [facility] ENDOSCOPY;  Service: Gastroenterology;  Laterality: N/A;  pre: screen  post: normal   Jose Ramon Agarwal MD   • ELBOW ARTHROSCOPY Left 2019   • ENDOSCOPY N/A 2020    Procedure: ESOPHAGOGASTRODUODENOSCOPY WITH ANESTHESIA;  Surgeon: Eduard Taylor MD;  Location: [facility] ENDOSCOPY;  Service: Gastroenterology;  Laterality: N/A;  pre: Pharyngoesophageal dysphagia  post: esophagitis   Jose Ramon Agarwal MD   • HAND RECONSTRUCTION Right    • HYSTERECTOMY     • OOPHORECTOMY     • REPLACEMENT TOTAL KNEE Left    • TONSILLECTOMY         Social History  "    Socioeconomic History   • Marital status:    Tobacco Use   • Smoking status: Former Smoker   • Smokeless tobacco: Never Used   Vaping Use   • Vaping Use: Never used   Substance and Sexual Activity   • Alcohol use: Yes     Comment: occ   • Drug use: No   • Sexual activity: Defer       Family History   Problem Relation Age of Onset   • Breast cancer Maternal Aunt    • Heart failure Mother    • Heart disease Father    • Heart attack Father    • No Known Problems Sister    • No Known Problems Brother    • No Known Problems Daughter    • No Known Problems Son    • No Known Problems Maternal Grandmother    • No Known Problems Paternal Grandmother    • No Known Problems Paternal Aunt    • No Known Problems Other    • BRCA 1/2 Neg Hx    • Colon cancer Neg Hx    • Endometrial cancer Neg Hx    • Ovarian cancer Neg Hx    • Colon polyps Neg Hx        Objective    Ht 160 cm (63\")   Wt 70.8 kg (156 lb)   BMI 27.63 kg/m²     Physical Exam  Nursing note reviewed.   Constitutional:       General: She is not in acute distress.     Appearance: Normal appearance. She is not ill-appearing.   HENT:      Nose: No congestion.   Abdominal:      Tenderness: There is abdominal tenderness in the suprapubic area. There is no right CVA tenderness or left CVA tenderness.      Hernia: No hernia is present.   Skin:     General: Skin is warm and dry.   Neurological:      Mental Status: She is alert and oriented to person, place, and time.   Psychiatric:         Mood and Affect: Mood normal.         Behavior: Behavior normal.             Results for orders placed or performed in visit on 04/26/22   POC Urinalysis Dipstick    Specimen: Urine   Result Value Ref Range    Color Yellow Yellow, Straw, Dark Yellow, Jocelyne    Clarity, UA Clear Clear    Glucose, UA Negative Negative, 1000 mg/dL (3+) mg/dL    Bilirubin Negative Negative    Ketones, UA Negative Negative    Specific Gravity  1.025 1.005 - 1.030    Blood, UA Negative Negative    pH, " Urine 6.0 5.0 - 8.0    Protein, POC Negative Negative mg/dL    Urobilinogen, UA Normal Normal    Leukocytes Negative Negative    Nitrite, UA Negative Negative   KUB independent review    A KUB is available for me to review today.  The image is inspected for a bowel gas pattern and the general bone structure of the spine and pelvis. The kidneys are then inspected closely.  Renal outline is noted if identifiable. The kidney, collecting system, and anticipated path of the ureter are examined for calcifications including those in the true pelvis.  This film reveals:    On the right there is a single renal stone measuring 10 mm.    On the left there are multiple renal stones. 3mm.    Independent review of CT scan of the abdomen/pelvis The patient has undergone a CT scan of the abdomen and pelvis Without contrast. The images are available for me to review as an independent interpretation for evaluation and management.  Assessment of the renal parenchyma with regards to thickness, scarring, symmetry in appearance and function, presence of masses both pre-and postcontrast, and calcifications are noted.  The collecting system with regard to dilatation, presence of calcifications, and masses were reviewed.  The course and caliber the ureters also noted.  The renal vessels and retroperitoneum is inspected for pathology.  The solid viscera and bowel pattern are briefly reviewed, but will also be inspected by the radiologist. The renal pelvis is inspected.  This study shows a 10 mm nonobstructing posterior midpole right kidney stone as well as 2 stones in the left kidney a 3 mm stone in the upper pole of the left kidney and a 2 mm stone in the lower pole of the left kidney.  No ureteral stones visualized .    Assessment and Plan    Diagnoses and all orders for this visit:    1. Suprapubic pain (Primary)    2. Kidney stones  -     POC Urinalysis Dipstick  -     XR abdomen kub; Future  -     XR abdomen kub; Future      62-year-old  female new patient referred for kidney stones found on CT of the abdomen and pelvis from recent ER visit on 4/18/2022 due to left flank and suprapubic pain.  Patient CT of the abdomen and pelvis from 4/25/2022 showing a 10 mm nonobstructing posterior midpole right kidney stone as well as 2 stones in the left kidney a 3 mm stone in the upper pole of the left kidney and a 2 mm stone in the lower pole of the left kidney.  No ureteral stones visualized. Patient's KUB today showing the same without evidence of any ureteral stones.  There are a few small pelvic phleboliths noted.     At present time I do not feel that the kidney stones are the source to the patient's suprapubic pain and discomfort at this time.  It is unclear as to the reasoning behind the suprapubic pain as patient reports that it comes and goes and is worse with position changes.    I did however discuss the option of surgical intervention for the 10 mm intrarenal stone on the right side although this would not improve her symptoms of suprapubic pain at this time.  Patient acted as though she was interested in pursuing surgical intervention at a later date although she states that she will most likely wait a few months before proceeding with surgical intervention.    At this time due to patient's suprapubic pain and the known history of the renal stones I will bring patient back for follow-up in 1 week with a KUB prior to ensure no ureteral stones present.

## 2022-05-10 ENCOUNTER — OFFICE VISIT (OUTPATIENT)
Dept: UROLOGY | Facility: CLINIC | Age: 62
End: 2022-05-10

## 2022-05-10 VITALS — BODY MASS INDEX: 28.7 KG/M2 | HEIGHT: 63 IN | WEIGHT: 162 LBS | TEMPERATURE: 98.2 F

## 2022-05-10 DIAGNOSIS — N20.0 KIDNEY STONES: Primary | ICD-10-CM

## 2022-05-10 LAB
BILIRUB BLD-MCNC: NEGATIVE MG/DL
CLARITY, POC: CLEAR
COLOR UR: NORMAL
GLUCOSE UR STRIP-MCNC: NEGATIVE MG/DL
KETONES UR QL: NEGATIVE
LEUKOCYTE EST, POC: NEGATIVE
NITRITE UR-MCNC: NEGATIVE MG/ML
PH UR: 6 [PH] (ref 5–8)
PROT UR STRIP-MCNC: NEGATIVE MG/DL
RBC # UR STRIP: NEGATIVE /UL
SP GR UR: 1.01 (ref 1–1.03)
UROBILINOGEN UR QL: NORMAL

## 2022-05-10 PROCEDURE — 81001 URINALYSIS AUTO W/SCOPE: CPT

## 2022-05-10 PROCEDURE — 99213 OFFICE O/P EST LOW 20 MIN: CPT

## 2022-05-10 NOTE — PROGRESS NOTES
Subjective    Ms. Medley is 62 y.o. female    Chief Complaint: Follow-up kidney stones  History of Present Illness  62-year-old female established patient in for follow-up regarding suprapubic pain and kidney stones.  Patient was seen approximately 2 weeks ago by me for complaints of suprapubic pain and no findings on CT scan from 4/25/2002 showing a 10 mm nonobstructing midpole right renal stone as well as 2 stones in the left kidney one being 3 mm in the upper pole the other 2 mm in the lower pole.  Patient states at this time that her suprapubic pain is no longer present.  She was able to run the [employer] half marathon this weekend without difficulty.  Patient's urinalysis today is clear no signs of infection or blood.  Patient did not have a KUB prior to this visit, no new imaging to review at this time.  Patient questioning when she should get the 10 mm right renal stone treated, patient reports that she is leaving for [place] on Monday and did not know if she should get the stone treated before she left.  Patient denies any fever, chills, nausea or vomiting.  Patient ports no difficulty with urination is fully emptying.    The following portions of the patient's history were reviewed and updated as appropriate: allergies, current medications, past family history, past medical history, past social history, past surgical history and problem list.    Review of Systems   Constitutional: Negative for chills, fatigue and fever.   Gastrointestinal: Negative for nausea and vomiting.   Genitourinary: Positive for frequency. Negative for decreased urine volume, difficulty urinating, dyspareunia, dysuria, enuresis, flank pain, genital sores, hematuria, menstrual problem, pelvic pain, urgency, vaginal bleeding, vaginal discharge and vaginal pain.         Current Outpatient Medications:   •  acetaminophen (TYLENOL) 325 MG tablet, Take 300 mg by mouth 3 (Three) Times a Day., Disp: , Rfl:   •  amLODIPine (NORVASC) 5 MG  tablet, Take 5 mg by mouth Daily., Disp: , Rfl:   •  brimonidine (ALPHAGAN) 0.2 % ophthalmic solution, INSTILL 1 DROP BOTH EYES TWICE A DAY AS DIRECTED, Disp: , Rfl:   •  diazePAM (VALIUM) 2 MG tablet, , Disp: , Rfl:   •  diclofenac sodium (VOTAREN XR) 100 MG 24 hr tablet, Take 100 mg by mouth Daily., Disp: , Rfl:   •  dicyclomine (BENTYL) 20 MG tablet, Take 1 tablet by mouth Every 6 (Six) Hours As Needed (abdominal cramping)., Disp: 12 tablet, Rfl: 0  •  estradiol (ESTRACE) 2 MG tablet, Take 2 mg by mouth Daily., Disp: , Rfl:   •  fingolimod (GILENYA) 0.5 MG capsule, Take 0.5 mg by mouth Daily., Disp: , Rfl:   •  gabapentin (NEURONTIN) 800 MG tablet, Take 900 mg by mouth 3 (Three) Times a Day., Disp: , Rfl:   •  HYDROcodone-acetaminophen (NORCO) 5-325 MG per tablet, Take 1 tablet by mouth 3 (Three) Times a Day., Disp: , Rfl:   •  levothyroxine (SYNTHROID, LEVOTHROID) 125 MCG tablet, Take 125 mcg by mouth Daily., Disp: , Rfl:   •  traZODone (DESYREL) 150 MG tablet, Take 300 mg by mouth Every Night., Disp: , Rfl:     Past Medical History:   Diagnosis Date   • Disease of thyroid gland    • Glaucoma    • Glaucoma    • Hypertension    • MS (multiple sclerosis) (HCC)        Past Surgical History:   Procedure Laterality Date   • APPENDECTOMY     •  SECTION     • CHOLECYSTECTOMY     • COLONOSCOPY N/A 2020    Procedure: COLONOSCOPY WITH ANESTHESIA;  Surgeon: Eduard Taylor MD;  Location: [facility] ENDOSCOPY;  Service: Gastroenterology;  Laterality: N/A;  pre: screen  post: normal   Jose Ramon Agarwal MD   • ELBOW ARTHROSCOPY Left 2019   • ENDOSCOPY N/A 2020    Procedure: ESOPHAGOGASTRODUODENOSCOPY WITH ANESTHESIA;  Surgeon: Eduard Taylor MD;  Location: [facility] ENDOSCOPY;  Service: Gastroenterology;  Laterality: N/A;  pre: Pharyngoesophageal dysphagia  post: esophagitis   Jose Ramon Agarwal MD   • HAND RECONSTRUCTION Right    • HYSTERECTOMY     • OOPHORECTOMY     • REPLACEMENT TOTAL KNEE Left    •  "TONSILLECTOMY         Social History     Socioeconomic History   • Marital status:    Tobacco Use   • Smoking status: Former Smoker   • Smokeless tobacco: Never Used   Vaping Use   • Vaping Use: Never used   Substance and Sexual Activity   • Alcohol use: Yes     Comment: occ   • Drug use: No   • Sexual activity: Defer       Family History   Problem Relation Age of Onset   • Breast cancer Maternal Aunt    • Heart failure Mother    • Heart disease Father    • Heart attack Father    • No Known Problems Sister    • No Known Problems Brother    • No Known Problems Daughter    • No Known Problems Son    • No Known Problems Maternal Grandmother    • No Known Problems Paternal Grandmother    • No Known Problems Paternal Aunt    • No Known Problems Other    • BRCA 1/2 Neg Hx    • Colon cancer Neg Hx    • Endometrial cancer Neg Hx    • Ovarian cancer Neg Hx    • Colon polyps Neg Hx        Objective    Temp 98.2 °F (36.8 °C) (Temporal)   Ht 160 cm (63\")   Wt 73.5 kg (162 lb)   Breastfeeding No   BMI 28.70 kg/m²     Physical Exam  Nursing note reviewed.   Constitutional:       General: She is not in acute distress.     Appearance: Normal appearance. She is not ill-appearing.   HENT:      Nose: No congestion.   Abdominal:      Tenderness: There is no right CVA tenderness or left CVA tenderness.      Hernia: No hernia is present.   Skin:     General: Skin is warm and dry.   Neurological:      Mental Status: She is alert and oriented to person, place, and time.   Psychiatric:         Mood and Affect: Mood normal.         Behavior: Behavior normal.             Results for orders placed or performed in visit on 05/10/22   POC Urinalysis Dipstick, Multipro    Specimen: Urine   Result Value Ref Range    Color Straw Yellow, Straw, Dark Yellow, Jocelyne    Clarity, UA Clear Clear    Glucose, UA Negative Negative, 1000 mg/dL (3+) mg/dL    Bilirubin Negative Negative    Ketones, UA Negative Negative    Specific Gravity  1.015 " 1.005 - 1.030    Blood, UA Negative Negative    pH, Urine 6.0 5.0 - 8.0    Protein, POC Negative Negative mg/dL    Urobilinogen, UA Normal Normal    Nitrite, UA Negative Negative    Leukocytes Negative Negative   CT Abdomen Pelvis Without Contrast (04/25/2022 13:51)    Assessment and Plan    Diagnoses and all orders for this visit:    1. Kidney stones (Primary)  -     POC Urinalysis Dipstick, Multipro      62-year-old female established patient in for follow-up regarding suprapubic pain and kidney stones.  Patient was seen approximately 2 weeks ago by me for complaints of suprapubic pain and no findings on CT scan from 4/25/2002 showing a 10 mm nonobstructing midpole right renal stone as well as 2 stones in the left kidney one being 3 mm in the upper pole the other 2 mm in the lower pole.  Patient states at this time that her suprapubic pain is no longer present. Patient's urinalysis today is clear no signs of infection or blood.  Patient did not have a KUB prior to this visit, no new imaging to review at this time.  Patient questioning when she should get the 10 mm right renal stone treated, patient reports that she is leaving for [place] on Monday and did not know if she should get the stone treated before she left.     Patient was educated on the different procedures available to treat patient's 10 mm midpole right renal stone.  Patient was informed of the possibility of stent placement and the side effects of the stent.  At this time patient has decided to wait to have the 10 mm stone treated until patient returns from [place] as the patient's stone is nonobstructing and not causing the patient any pain and discomfort at this time.  Patient would like to follow-up in 3 months with a KUB prior to reevaluate the stone and be scheduled for surgical intervention at that time.    Patient was educated that there is a high possibility that the patient could pass the 2 and 3 mm stones on her own without surgical  intervention and that the jarring movements from the amusement park rides could possibly cause the stones to move.

## 2022-08-23 NOTE — PROGRESS NOTES
Subjective    Ms. Medley is 62 y.o. female    Chief Complaint: Follow-up kidney stones    History of Present Illness     62-year-old established patient in for follow-up regarding history of kidney stones.  Patient last seen May of this year at that time patient had a known 10 mm right midpole renal stone however patient stated that she was heading on vacation and would like to have the stone treated.  Patient presents at this time ready to have the stone treated.  Patient currently no complaints of flank pain or hematuria.  KUB today the 10 mm right midpole renal stone remains.    The following portions of the patient's history were reviewed and updated as appropriate: allergies, current medications, past family history, past medical history, past social history, past surgical history and problem list.    Review of Systems   Constitutional: Negative for chills, fatigue and fever.   Gastrointestinal: Negative for nausea and vomiting.   Genitourinary: Negative for decreased urine volume, difficulty urinating, dysuria, flank pain, frequency, hematuria, pelvic pain, urgency and vaginal pain.         Current Outpatient Medications:   •  acetaminophen (TYLENOL) 325 MG tablet, Take 300 mg by mouth 3 (Three) Times a Day., Disp: , Rfl:   •  amLODIPine (NORVASC) 5 MG tablet, Take 5 mg by mouth Daily., Disp: , Rfl:   •  baclofen (LIORESAL) 10 MG tablet, TAKE 1/2 TO 1 (ONE-HALF TO ONE) TABLET BY MOUTH THREE TIMES DAILY AS NEEDED, Disp: , Rfl:   •  brimonidine (ALPHAGAN) 0.2 % ophthalmic solution, INSTILL 1 DROP BOTH EYES TWICE A DAY AS DIRECTED, Disp: , Rfl:   •  diazePAM (VALIUM) 2 MG tablet, , Disp: , Rfl:   •  diclofenac sodium (VOTAREN XR) 100 MG 24 hr tablet, Take 100 mg by mouth Daily., Disp: , Rfl:   •  dicyclomine (BENTYL) 20 MG tablet, Take 1 tablet by mouth Every 6 (Six) Hours As Needed (abdominal cramping)., Disp: 12 tablet, Rfl: 0  •  estradiol (ESTRACE) 2 MG tablet, Take 2 mg by mouth Daily., Disp: , Rfl:   •   Euthyrox 150 MCG tablet, Take 150 mcg by mouth Daily., Disp: , Rfl:   •  fingolimod (GILENYA) 0.5 MG capsule, Take 0.5 mg by mouth Daily., Disp: , Rfl:   •  gabapentin (NEURONTIN) 800 MG tablet, Take 900 mg by mouth 3 (Three) Times a Day., Disp: , Rfl:   •  HYDROcodone-acetaminophen (NORCO) 5-325 MG per tablet, Take 1 tablet by mouth 3 (Three) Times a Day., Disp: , Rfl:   •  levothyroxine (SYNTHROID, LEVOTHROID) 125 MCG tablet, Take 125 mcg by mouth Daily., Disp: , Rfl:   •  Prolensa 0.07 % solution, INSTILL 1 DROP INTO AFFECTED EYE ONCE DAILY AS DIRECTED BEGIN 1 DAY PRIOR TO SURGERY, Disp: , Rfl:   •  tobramycin 0.3 % solution ophthalmic solution, INSTILL 1 DROP INTO AFFECTED EYE THREE TIMES DAILY AS DIRECTED BEGIN 1 DAY PRIOR TO SURGERY, Disp: , Rfl:   •  traZODone (DESYREL) 150 MG tablet, Take 300 mg by mouth Every Night., Disp: , Rfl:     Past Medical History:   Diagnosis Date   • Disease of thyroid gland    • Glaucoma    • Glaucoma    • Hypertension    • MS (multiple sclerosis) (HCC)        Past Surgical History:   Procedure Laterality Date   • APPENDECTOMY     •  SECTION     • CHOLECYSTECTOMY     • COLONOSCOPY N/A 2020    Procedure: COLONOSCOPY WITH ANESTHESIA;  Surgeon: Eduard Taylor MD;  Location: [facility] ENDOSCOPY;  Service: Gastroenterology;  Laterality: N/A;  pre: screen  post: normal   Jose Ramon Agarwal MD   • ELBOW ARTHROSCOPY Left 2019   • ENDOSCOPY N/A 2020    Procedure: ESOPHAGOGASTRODUODENOSCOPY WITH ANESTHESIA;  Surgeon: Eduard Taylor MD;  Location: [facility] ENDOSCOPY;  Service: Gastroenterology;  Laterality: N/A;  pre: Pharyngoesophageal dysphagia  post: esophagitis   Jose Ramon Agarwal MD   • HAND RECONSTRUCTION Right    • HYSTERECTOMY     • OOPHORECTOMY     • REPLACEMENT TOTAL KNEE Left    • TONSILLECTOMY         Social History     Socioeconomic History   • Marital status:    Tobacco Use   • Smoking status: Former Smoker   • Smokeless tobacco: Never Used   Vaping  "Use   • Vaping Use: Never used   Substance and Sexual Activity   • Alcohol use: Yes     Comment: occ   • Drug use: No   • Sexual activity: Defer       Family History   Problem Relation Age of Onset   • Breast cancer Maternal Aunt    • Heart failure Mother    • Heart disease Father    • Heart attack Father    • No Known Problems Sister    • No Known Problems Brother    • No Known Problems Daughter    • No Known Problems Son    • No Known Problems Maternal Grandmother    • No Known Problems Paternal Grandmother    • No Known Problems Paternal Aunt    • No Known Problems Other    • BRCA 1/2 Neg Hx    • Colon cancer Neg Hx    • Endometrial cancer Neg Hx    • Ovarian cancer Neg Hx    • Colon polyps Neg Hx        Objective    Temp 97.7 °F (36.5 °C)   Ht 160 cm (63\")   Wt 75.8 kg (167 lb)   BMI 29.58 kg/m²     Physical Exam  Nursing note reviewed.   Constitutional:       General: She is not in acute distress.     Appearance: Normal appearance. She is not ill-appearing.   HENT:      Nose: No congestion.   Abdominal:      Tenderness: There is no right CVA tenderness or left CVA tenderness.      Hernia: No hernia is present.   Skin:     General: Skin is warm and dry.   Neurological:      Mental Status: She is alert and oriented to person, place, and time.   Psychiatric:         Mood and Affect: Mood normal.         Behavior: Behavior normal.             Results for orders placed or performed in visit on 08/24/22   POC Urinalysis Dipstick, Multipro    Specimen: Urine   Result Value Ref Range    Color Yellow Yellow, Straw, Dark Yellow, Jocelyne    Clarity, UA Clear Clear    Glucose, UA Negative Negative mg/dL    Bilirubin Negative Negative    Ketones, UA Negative Negative    Specific Gravity  1.015 1.005 - 1.030    Blood, UA Negative Negative    pH, Urine 5.5 5.0 - 8.0    Protein, POC Negative Negative mg/dL    Urobilinogen, UA 0.2 E.U./dL Normal, 0.2 E.U./dL    Nitrite, UA Negative Negative    Leukocytes Negative Negative "     KUB independent review    A KUB is available for me to review today.  The image is inspected for a bowel gas pattern and the general bone structure of the spine and pelvis. The kidneys are then inspected closely.  Renal outline is noted if identifiable. The kidney, collecting system, and anticipated path of the ureter are examined for calcifications including those in the true pelvis.  This film reveals:    On the right there is a single renal stone measuring 10 mm.    On the left there are no calcificaitons seen in the kidney or the expected course of the ureter. .    Independent review of a CT scan of abdomen and pelvis without contrast  The CT scan of the abdomen/pelvis done without contrast is available for me to review.  Treatment recommendations require an independent review.  First I scanned the liver, spleen, and bowel pattern.  The retroperitoneum including the major vessels and lymphatic packages are briefly reviewed.  This film has been reviewed by the radiologist to determine any nonurologic abnormalities that are present.  The kidneys are closely inspected for size, symmetry, contour, parenchymal thickness, perinephric reaction, presence of calcifications, and intrarenal dilation of the collecting system.  The ureters are inspected for their course, caliber, and any calcifications.  The bladder is inspected for its thickness, size, and presence of any calcifications.  This scan shows 10 mm right midpole renal stone.  No hydronephrosis.  No ureteral stones visualized.  There does appear to be 2 small right renal stones measuring approximately 2 to 3 mm 1 in the upper pole and 1 in the lower pole.  No bladder abnormality visualized..     XR Abdomen KUB (08/24/2022 08:45)  CT Abdomen Pelvis Without Contrast (04/25/2022 13:51)        Assessment and Plan    Diagnoses and all orders for this visit:    1. Kidney stones (Primary)  -     POC Urinalysis Dipstick, Multipro  -     Case Request; Standing  -      COVID PRE-OP / PRE-PROCEDURE SCREENING ORDER (NO ISOLATION) - Swab, Nasopharynx; Future  -     ECG 12 Lead; Future  -     Case Request    Other orders  -     Follow Anesthesia Guidelines / Standing Orders; Future  -     Obtain Informed Consent; Future  -     Provide NPO Instructions to Patient; Future  -     Chlorhexidine Skin Prep; Future        62-year-old established patient in for follow-up regarding history of kidney stones.  Patient last seen May of this year at that time patient had a known 10 mm right midpole renal stone however patient stated that she was heading on vacation and would like to have the stone treated.  Patient presents at this time ready to have the stone treated.  Patient currently no complaints of flank pain or hematuria.  KUB today the 10 mm right midpole renal stone remains.    Patient reports she has a race in the middle of September and would like to get the right ESWL procedure scheduled for the middle of October.    Patient was educated on the risks and benefits of the procedure as well as the possibility of needing more than 1 procedure.  Patient voiced understanding and is in agreement to proceed.

## 2022-08-24 ENCOUNTER — OFFICE VISIT (OUTPATIENT)
Dept: UROLOGY | Facility: CLINIC | Age: 62
End: 2022-08-24

## 2022-08-24 ENCOUNTER — HOSPITAL ENCOUNTER (OUTPATIENT)
Dept: GENERAL RADIOLOGY | Facility: HOSPITAL | Age: 62
Discharge: HOME | End: 2022-08-24

## 2022-08-24 ENCOUNTER — TELEPHONE (OUTPATIENT)
Dept: UROLOGY | Facility: CLINIC | Age: 62
End: 2022-08-24

## 2022-08-24 VITALS — WEIGHT: 167 LBS | TEMPERATURE: 97.7 F | BODY MASS INDEX: 29.59 KG/M2 | HEIGHT: 63 IN

## 2022-08-24 DIAGNOSIS — N20.0 KIDNEY STONES: ICD-10-CM

## 2022-08-24 DIAGNOSIS — N20.0 KIDNEY STONES: Primary | ICD-10-CM

## 2022-08-24 LAB
BILIRUB BLD-MCNC: NEGATIVE MG/DL
CLARITY, POC: CLEAR
COLOR UR: YELLOW
GLUCOSE UR STRIP-MCNC: NEGATIVE MG/DL
KETONES UR QL: NEGATIVE
LEUKOCYTE EST, POC: NEGATIVE
NITRITE UR-MCNC: NEGATIVE MG/ML
PH UR: 5.5 [PH] (ref 5–8)
PROT UR STRIP-MCNC: NEGATIVE MG/DL
RBC # UR STRIP: NEGATIVE /UL
SP GR UR: 1.01 (ref 1–1.03)
UROBILINOGEN UR QL: NORMAL

## 2022-08-24 PROCEDURE — 99214 OFFICE O/P EST MOD 30 MIN: CPT

## 2022-08-24 PROCEDURE — 74018 RADEX ABDOMEN 1 VIEW: CPT

## 2022-08-24 PROCEDURE — 81001 URINALYSIS AUTO W/SCOPE: CPT

## 2022-08-24 RX ORDER — TOBRAMYCIN 3 MG/ML
SOLUTION/ DROPS OPHTHALMIC
COMMUNITY
Start: 2022-07-13 | End: 2022-08-31

## 2022-08-24 RX ORDER — SODIUM CHLORIDE 9 MG/ML
100 INJECTION, SOLUTION INTRAVENOUS CONTINUOUS
Status: CANCELLED | OUTPATIENT
Start: 2022-08-24

## 2022-08-24 RX ORDER — LEVOTHYROXINE SODIUM 150 UG/1
150 TABLET ORAL DAILY
COMMUNITY
Start: 2022-08-07 | End: 2022-10-17

## 2022-08-24 RX ORDER — BACLOFEN 10 MG/1
TABLET ORAL
COMMUNITY
Start: 2022-06-30

## 2022-08-24 RX ORDER — BROMFENAC SODIUM 0.7 MG/ML
SOLUTION/ DROPS OPHTHALMIC
COMMUNITY
Start: 2022-07-15 | End: 2022-08-31

## 2022-08-24 NOTE — TELEPHONE ENCOUNTER
Called patient and left a message for her to call our office back to go over her surgery information.   She is scheduled on 10/19 at 0800 for R ESWL with dr jimenez. Pre work is scheduled 10/17 @ [id number]. She will needs to stop any blood thinners for 7 days prior to surgery and NPO after midnight.

## 2022-09-14 ENCOUNTER — TRANSCRIBE ORDERS (OUTPATIENT)
Dept: ADMINISTRATIVE | Facility: HOSPITAL | Age: 62
End: 2022-09-14

## 2022-09-14 DIAGNOSIS — Z12.31 ENCOUNTER FOR SCREENING MAMMOGRAM FOR MALIGNANT NEOPLASM OF BREAST: Primary | ICD-10-CM

## 2022-09-26 ENCOUNTER — APPOINTMENT (OUTPATIENT)
Dept: MAMMOGRAPHY | Facility: HOSPITAL | Age: 62
End: 2022-09-26

## 2022-10-07 ENCOUNTER — HOSPITAL ENCOUNTER (OUTPATIENT)
Dept: MAMMOGRAPHY | Facility: HOSPITAL | Age: 62
Discharge: HOME | End: 2022-10-07
Admitting: FAMILY MEDICINE

## 2022-10-07 DIAGNOSIS — Z12.31 ENCOUNTER FOR SCREENING MAMMOGRAM FOR MALIGNANT NEOPLASM OF BREAST: ICD-10-CM

## 2022-10-07 PROCEDURE — 77063 BREAST TOMOSYNTHESIS BI: CPT

## 2022-10-07 PROCEDURE — 77067 SCR MAMMO BI INCL CAD: CPT

## 2022-10-17 ENCOUNTER — PRE-ADMISSION TESTING (OUTPATIENT)
Dept: PREADMISSION TESTING | Facility: HOSPITAL | Age: 62
End: 2022-10-17

## 2022-10-17 VITALS
RESPIRATION RATE: 18 BRPM | SYSTOLIC BLOOD PRESSURE: 151 MMHG | DIASTOLIC BLOOD PRESSURE: 72 MMHG | HEART RATE: 84 BPM | WEIGHT: 166.23 LBS | HEIGHT: 64 IN | OXYGEN SATURATION: 96 % | BODY MASS INDEX: 28.38 KG/M2

## 2022-10-17 DIAGNOSIS — N20.0 KIDNEY STONES: ICD-10-CM

## 2022-10-17 LAB
ANION GAP SERPL CALCULATED.3IONS-SCNC: 9 MMOL/L (ref 5–15)
APTT PPP: 30 SECONDS (ref 24.1–35)
BASOPHILS # BLD AUTO: 0.03 10*3/MM3 (ref 0–0.2)
BASOPHILS NFR BLD AUTO: 0.5 % (ref 0–1.5)
BUN SERPL-MCNC: 19 MG/DL (ref 8–23)
BUN/CREAT SERPL: 30.6 (ref 7–25)
CALCIUM SPEC-SCNC: 9.8 MG/DL (ref 8.6–10.5)
CHLORIDE SERPL-SCNC: 101 MMOL/L (ref 98–107)
CO2 SERPL-SCNC: 29 MMOL/L (ref 22–29)
CREAT SERPL-MCNC: 0.62 MG/DL (ref 0.57–1)
DEPRECATED RDW RBC AUTO: 48 FL (ref 37–54)
EGFRCR SERPLBLD CKD-EPI 2021: 102.1 ML/MIN/1.73
EOSINOPHIL # BLD AUTO: 0.28 10*3/MM3 (ref 0–0.4)
EOSINOPHIL NFR BLD AUTO: 4.7 % (ref 0.3–6.2)
ERYTHROCYTE [DISTWIDTH] IN BLOOD BY AUTOMATED COUNT: 13.2 % (ref 12.3–15.4)
GLUCOSE SERPL-MCNC: 88 MG/DL (ref 65–99)
HCT VFR BLD AUTO: 39 % (ref 34–46.6)
HGB BLD-MCNC: 12.9 G/DL (ref 12–15.9)
IMM GRANULOCYTES # BLD AUTO: 0.02 10*3/MM3 (ref 0–0.05)
IMM GRANULOCYTES NFR BLD AUTO: 0.3 % (ref 0–0.5)
INR PPP: 0.89 (ref 0.91–1.09)
LYMPHOCYTES # BLD AUTO: 1.38 10*3/MM3 (ref 0.7–3.1)
LYMPHOCYTES NFR BLD AUTO: 23.2 % (ref 19.6–45.3)
MCH RBC QN AUTO: 32.7 PG (ref 26.6–33)
MCHC RBC AUTO-ENTMCNC: 33.1 G/DL (ref 31.5–35.7)
MCV RBC AUTO: 99 FL (ref 79–97)
MONOCYTES # BLD AUTO: 0.86 10*3/MM3 (ref 0.1–0.9)
MONOCYTES NFR BLD AUTO: 14.4 % (ref 5–12)
NEUTROPHILS NFR BLD AUTO: 3.39 10*3/MM3 (ref 1.7–7)
NEUTROPHILS NFR BLD AUTO: 56.9 % (ref 42.7–76)
NRBC BLD AUTO-RTO: 0 /100 WBC (ref 0–0.2)
PLATELET # BLD AUTO: 336 10*3/MM3 (ref 140–450)
PMV BLD AUTO: 9.7 FL (ref 6–12)
POTASSIUM SERPL-SCNC: 4.4 MMOL/L (ref 3.5–5.2)
PROTHROMBIN TIME: 11.7 SECONDS (ref 11.9–14.6)
RBC # BLD AUTO: 3.94 10*6/MM3 (ref 3.77–5.28)
SODIUM SERPL-SCNC: 139 MMOL/L (ref 136–145)
WBC NRBC COR # BLD: 5.96 10*3/MM3 (ref 3.4–10.8)

## 2022-10-17 PROCEDURE — 85730 THROMBOPLASTIN TIME PARTIAL: CPT

## 2022-10-17 PROCEDURE — 93010 ELECTROCARDIOGRAM REPORT: CPT | Performed by: INTERNAL MEDICINE

## 2022-10-17 PROCEDURE — 85610 PROTHROMBIN TIME: CPT

## 2022-10-17 PROCEDURE — 93005 ELECTROCARDIOGRAM TRACING: CPT

## 2022-10-17 PROCEDURE — 85025 COMPLETE CBC W/AUTO DIFF WBC: CPT

## 2022-10-17 PROCEDURE — 80048 BASIC METABOLIC PNL TOTAL CA: CPT

## 2022-10-17 NOTE — DISCHARGE INSTRUCTIONS
Before you come to the hospital        Arrival time: AS DIRECTED BY OFFICE     YOU MAY TAKE THE FOLLOWING MEDICATION(S) THE MORNING OF SURGERY WITH A SIP OF WATER: VALIUM, GABAPENTIN, NORCO           ALL OTHER HOME MEDICATION CHECK WITH YOUR PHYSICIAN (especially if you are taking diabetes medicines or blood thinners)      If you were given and instructed to use a germ- killing soap, use as directed the night before surgery and again the morning of surgery or as directed by your surgeon.    (See attached information for How to Use Chlorhexidine for Bathing if applicable.)            Eating and drinking restrictions prior to scheduled arrival time    2 Hours before arrival time STOP   Drinking Clear liquids (water, apple juice-no pulp)     6 Hours before arrival time STOP   Milk or drinks that contain milk, full liquids    6 Hours before arrival time STOP   Light meals or foods, such as toast or cereal    8 Hours before arrival time STOP   Heavy foods, such as meat, fried foods, or fatty foods    (It is extremely important that you follow these guidelines to prevent delay or cancelation of your procedure)     Clear Liquids  Water and flavored water                                                                      Clear Fruit juices, such as cranberry juice and apple juice.  Black coffee (NO cream of any kind, including powdered).  Plain tea  Clear bouillon or broth.  Flavored gelatin.  Soda.  Gatorade or Powerade.  Full liquid examples  Juices that have pulp.  Frozen ice pops that contain fruit pieces.  Coffee with creamer  Milk.  Yogurt.                MANAGING PAIN AFTER SURGERY    We know you are probably wondering what your pain will be like after surgery.  Following surgery it is unrealistic to expect you will not have pain.   Pain is how our bodies let us know that something is wrong or cautions us to be careful.  That said, our goal is to make your pain tolerable.    Methods we may use to treat your pain  include (oral or IV medications, PCAs, epidurals, nerve blocks, etc.)   While some procedures require IV pain medications for a short time after surgery, transitioning to pain medications by mouth allows for better management of pain.   Your nurse will encourage you to take oral pain medications whenever possible.  IV medications work almost immediately, but only last a short while.  Taking medications by mouth allows for a more constant level of medication in your blood stream for a longer period of time.      Once your pain is out of control it is harder to get back under control.  It is important you are aware when your next dose of pain medication is due.  If you are admitted, your nurse may write the time of your next dose on the white board in your room to help you remember.      We are interested in your pain and encourage you to inform us about aggravating factors during your visit.   Many times a simple repositioning every few hours can make a big difference.    If your physician says it is okay, do not let your pain prevent you from getting out of bed. Be sure to call your nurse for assistance prior to getting up so you do not fall.      Before surgery, please decide your tolerable pain goal.  These faces help describe the pain ratings we use on a 0-10 scale.   Be prepared to tell us your goal and whether or not you take pain or anxiety medications at home.          Preparing for Surgery  Preparing for surgery is an important part of your care. It can make things go more smoothly and help you avoid complications. The steps leading up to surgery may vary among hospitals. Follow all instructions given to you by your health care providers. Ask questions if you do not understand something. Talk about any concerns that you have.  Here are some questions to consider asking before your surgery:  If my surgery is not an emergency (is elective), when would be the best time to have the surgery?  What arrangements do  I need to make for work, home, or school?  What will my recovery be like? How long will it be before I can return to normal activities?  Will I need to prepare my home? Will I need to arrange care for me or my children?  Should I expect to have pain after surgery? What are my pain management options? Are there nonmedical options that I can try for pain?  Tell a health care provider about:  Any allergies you have.  All medicines you are taking, including vitamins, herbs, eye drops, creams, and over-the-counter medicines.  Any problems you or family members have had with anesthetic medicines.  Any blood disorders you have.  Any surgeries you have had.  Any medical conditions you have.  Whether you are pregnant or may be pregnant.  What are the risks?  The risks and complications of surgery depend on the specific procedure that you have. Discuss all the risks with your health care providers before your surgery. Ask about common surgical complications, which may include:  Infection.  Bleeding or a need for blood replacement (transfusion).  Allergic reactions to medicines.  Damage to surrounding nerves, tissues, or structures.  A blood clot.  Scarring.  Failure of the surgery to correct the problem.  Follow these instructions before the procedure:  Several days or weeks before your procedure  You may have a physical exam by your primary health care provider to make sure it is safe for you to have surgery.  You may have testing. This may include a chest X-ray, blood and urine tests, electrocardiogram (ECG), or other testing.  Ask your health care provider about:  Changing or stopping your regular medicines. This is especially important if you are taking diabetes medicines or blood thinners.  Taking medicines such as aspirin and ibuprofen. These medicines can thin your blood. Do not take these medicines unless your health care provider tells you to take them.  Taking over-the-counter medicines, vitamins, herbs, and  supplements.  Do not use any products that contain nicotine or tobacco, such as cigarettes and e-cigarettes. If you need help quitting, ask your health care provider.  Avoid alcohol.  Ask your health care provider if there are exercises you can do to prepare for surgery.  Eat a healthy diet.   Plan to have someone take you home from the hospital or clinic.  Plan to have a responsible adult care for you for at least 24 hours after you leave the hospital or clinic. This is important.  The day before your procedure  You may be given antibiotic medicine to take by mouth to help prevent infection. Take it as told by your health care provider.  You may be asked to shower with a germ-killing soap.  Follow instructions from your health care provider about eating and drinking restrictions. This includes gum, mints and hard candy.  Pack comfortable clothes according to your procedure.   The day of your procedure  You may need to take another shower with a germ-killing soap before you leave home in the morning.  With a small sip of water, take only the medicines that you are told to take.  Remove all jewelry including rings.   Leave anything you consider valuable at home except hearing aids if needed.  Do not wear any makeup, nail polish, powder, deodorant, lotion, hair accessories, or anything on your skin or body except your clothes.  If you will be staying in the hospital, bring a case to hold your glasses, contacts, or dentures. You may also want to bring your robe and non-skid footwear.  If you wear oxygen at home, bring it with you the day of surgery.  If instructed by your health care provider, bring your sleep apnea device with you on the day of your surgery (if this applies to you).  You may want to leave your suitcase and sleep apnea device in the car until after surgery.   Arrive at the hospital as scheduled.  Bring a friend or family member with you who can help to answer questions and be present while you meet  with your health care provider.  At the hospital  When you arrive at the hospital:  Go to registration located at the main entrance of the hospital. You will be registered and given a beeper and a sticker sheet. Take the stickers to the Outpatient nurses desk and place in the black tray. This is to notify staff that you have arrived. Then return to the lobby to wait.   When your beeper lights up and vibrates proceed through the double doors, under the stairs, and a member of the Outpatient Surgery staff will escort you to your preoperative room.  You may have to wear compression sleeves. These help to prevent blood clots and reduce swelling in your legs.  An IV may be inserted into one of your veins.              In the operating room, you may be given one or more of the following:        A medicine to help you relax (sedative).        A medicine to numb the area (local anesthetic).        A medicine to make you fall asleep (general anesthetic).        A medicine that is injected into an area of your body to numb everything below the                      injection site (regional anesthetic).  You may be given an antibiotic through your IV to help prevent infection.  Your surgical site will be marked or identified.    Contact a health care provider if you:  Develop a fever of more than 100.4°F (38°C) or other feelings of illness during the 48 hours before your surgery.  Have symptoms that get worse.  Have questions or concerns about your surgery.  Summary  Preparing for surgery can make the procedure go more smoothly and lower your risk of complications.  Before surgery, make a list of questions and concerns to discuss with your surgeon. Ask about the risks and possible complications.  In the days or weeks before your surgery, follow all instructions from your health care provider. You may need to stop smoking, avoid alcohol, follow eating restrictions, and change or stop your regular medicines.  Contact your surgeon  if you develop a fever or other signs of illness during the few days before your surgery.  This information is not intended to replace advice given to you by your health care provider. Make sure you discuss any questions you have with your health care provider.  Document Revised: 12/21/2018 Document Reviewed: 10/23/2018  Elsevier Patient Education © 2021 [employer]

## 2022-10-19 ENCOUNTER — ANESTHESIA (OUTPATIENT)
Dept: PERIOP | Facility: HOSPITAL | Age: 62
End: 2022-10-19

## 2022-10-19 ENCOUNTER — HOSPITAL ENCOUNTER (OUTPATIENT)
Facility: HOSPITAL | Age: 62
Setting detail: HOSPITAL OUTPATIENT SURGERY
Discharge: HOME | End: 2022-10-19
Attending: UROLOGY | Admitting: UROLOGY

## 2022-10-19 ENCOUNTER — APPOINTMENT (OUTPATIENT)
Dept: GENERAL RADIOLOGY | Facility: HOSPITAL | Age: 62
End: 2022-10-19

## 2022-10-19 ENCOUNTER — ANESTHESIA EVENT (OUTPATIENT)
Dept: PERIOP | Facility: HOSPITAL | Age: 62
End: 2022-10-19

## 2022-10-19 VITALS
OXYGEN SATURATION: 95 % | HEART RATE: 75 BPM | TEMPERATURE: 97.6 F | DIASTOLIC BLOOD PRESSURE: 68 MMHG | SYSTOLIC BLOOD PRESSURE: 142 MMHG | RESPIRATION RATE: 12 BRPM

## 2022-10-19 DIAGNOSIS — N20.0 KIDNEY STONES: ICD-10-CM

## 2022-10-19 LAB
QT INTERVAL: 382 MS
QTC INTERVAL: 435 MS

## 2022-10-19 PROCEDURE — S0260 H&P FOR SURGERY: HCPCS | Performed by: UROLOGY

## 2022-10-19 PROCEDURE — 25010000002 GENTAMICIN PER 80 MG: Performed by: UROLOGY

## 2022-10-19 PROCEDURE — 74018 RADEX ABDOMEN 1 VIEW: CPT

## 2022-10-19 PROCEDURE — 50590 FRAGMENTING OF KIDNEY STONE: CPT | Performed by: UROLOGY

## 2022-10-19 PROCEDURE — 25010000002 DEXAMETHASONE PER 1 MG: Performed by: ANESTHESIOLOGY

## 2022-10-19 PROCEDURE — 25010000002 FENTANYL CITRATE (PF) 100 MCG/2ML SOLUTION: Performed by: NURSE ANESTHETIST, CERTIFIED REGISTERED

## 2022-10-19 PROCEDURE — 25010000002 PROPOFOL 10 MG/ML EMULSION: Performed by: NURSE ANESTHETIST, CERTIFIED REGISTERED

## 2022-10-19 PROCEDURE — 25010000002 MIDAZOLAM PER 1 MG: Performed by: ANESTHESIOLOGY

## 2022-10-19 RX ORDER — GENTAMICIN SULFATE 80 MG/100ML
80 INJECTION, SOLUTION INTRAVENOUS ONCE
Status: COMPLETED | OUTPATIENT
Start: 2022-10-19 | End: 2022-10-19

## 2022-10-19 RX ORDER — LIDOCAINE HYDROCHLORIDE 10 MG/ML
0.5 INJECTION, SOLUTION EPIDURAL; INFILTRATION; INTRACAUDAL; PERINEURAL ONCE AS NEEDED
Status: DISCONTINUED | OUTPATIENT
Start: 2022-10-19 | End: 2022-10-19 | Stop reason: HOSPADM

## 2022-10-19 RX ORDER — PROPOFOL 10 MG/ML
VIAL (ML) INTRAVENOUS AS NEEDED
Status: DISCONTINUED | OUTPATIENT
Start: 2022-10-19 | End: 2022-10-19 | Stop reason: SURG

## 2022-10-19 RX ORDER — LIDOCAINE HYDROCHLORIDE 20 MG/ML
INJECTION, SOLUTION EPIDURAL; INFILTRATION; INTRACAUDAL; PERINEURAL AS NEEDED
Status: DISCONTINUED | OUTPATIENT
Start: 2022-10-19 | End: 2022-10-19 | Stop reason: SURG

## 2022-10-19 RX ORDER — FLUMAZENIL 0.1 MG/ML
0.2 INJECTION INTRAVENOUS AS NEEDED
Status: DISCONTINUED | OUTPATIENT
Start: 2022-10-19 | End: 2022-10-19 | Stop reason: HOSPADM

## 2022-10-19 RX ORDER — NALOXONE HCL 0.4 MG/ML
0.04 VIAL (ML) INJECTION AS NEEDED
Status: DISCONTINUED | OUTPATIENT
Start: 2022-10-19 | End: 2022-10-19 | Stop reason: HOSPADM

## 2022-10-19 RX ORDER — DROPERIDOL 2.5 MG/ML
0.62 INJECTION, SOLUTION INTRAMUSCULAR; INTRAVENOUS ONCE AS NEEDED
Status: DISCONTINUED | OUTPATIENT
Start: 2022-10-19 | End: 2022-10-19 | Stop reason: HOSPADM

## 2022-10-19 RX ORDER — MIDAZOLAM HYDROCHLORIDE 1 MG/ML
2 INJECTION INTRAMUSCULAR; INTRAVENOUS ONCE
Status: COMPLETED | OUTPATIENT
Start: 2022-10-19 | End: 2022-10-19

## 2022-10-19 RX ORDER — HYDROMORPHONE HYDROCHLORIDE 1 MG/ML
0.5 INJECTION, SOLUTION INTRAMUSCULAR; INTRAVENOUS; SUBCUTANEOUS
Status: DISCONTINUED | OUTPATIENT
Start: 2022-10-19 | End: 2022-10-19 | Stop reason: HOSPADM

## 2022-10-19 RX ORDER — SODIUM CHLORIDE 9 MG/ML
100 INJECTION, SOLUTION INTRAVENOUS CONTINUOUS
Status: DISCONTINUED | OUTPATIENT
Start: 2022-10-19 | End: 2022-10-19 | Stop reason: HOSPADM

## 2022-10-19 RX ORDER — SCOLOPAMINE TRANSDERMAL SYSTEM 1 MG/1
1 PATCH, EXTENDED RELEASE TRANSDERMAL ONCE
Status: DISCONTINUED | OUTPATIENT
Start: 2022-10-19 | End: 2022-10-19 | Stop reason: HOSPADM

## 2022-10-19 RX ORDER — IBUPROFEN 600 MG/1
600 TABLET ORAL ONCE AS NEEDED
Status: DISCONTINUED | OUTPATIENT
Start: 2022-10-19 | End: 2022-10-19 | Stop reason: HOSPADM

## 2022-10-19 RX ORDER — DEXAMETHASONE SODIUM PHOSPHATE 4 MG/ML
4 INJECTION, SOLUTION INTRA-ARTICULAR; INTRALESIONAL; INTRAMUSCULAR; INTRAVENOUS; SOFT TISSUE ONCE AS NEEDED
Status: COMPLETED | OUTPATIENT
Start: 2022-10-19 | End: 2022-10-19

## 2022-10-19 RX ORDER — HYDROCODONE BITARTRATE AND ACETAMINOPHEN 5; 325 MG/1; MG/1
1 TABLET ORAL ONCE AS NEEDED
Status: DISCONTINUED | OUTPATIENT
Start: 2022-10-19 | End: 2022-10-19 | Stop reason: HOSPADM

## 2022-10-19 RX ORDER — GUAIFENESIN 600 MG/1
600 TABLET, EXTENDED RELEASE ORAL 2 TIMES DAILY PRN
COMMUNITY

## 2022-10-19 RX ORDER — OXYCODONE AND ACETAMINOPHEN 10; 325 MG/1; MG/1
1 TABLET ORAL ONCE AS NEEDED
Status: DISCONTINUED | OUTPATIENT
Start: 2022-10-19 | End: 2022-10-19 | Stop reason: HOSPADM

## 2022-10-19 RX ORDER — ONDANSETRON 2 MG/ML
4 INJECTION INTRAMUSCULAR; INTRAVENOUS
Status: DISCONTINUED | OUTPATIENT
Start: 2022-10-19 | End: 2022-10-19 | Stop reason: HOSPADM

## 2022-10-19 RX ORDER — SODIUM CHLORIDE 0.9 % (FLUSH) 0.9 %
10 SYRINGE (ML) INJECTION AS NEEDED
Status: DISCONTINUED | OUTPATIENT
Start: 2022-10-19 | End: 2022-10-19 | Stop reason: HOSPADM

## 2022-10-19 RX ORDER — LABETALOL HYDROCHLORIDE 5 MG/ML
5 INJECTION, SOLUTION INTRAVENOUS
Status: DISCONTINUED | OUTPATIENT
Start: 2022-10-19 | End: 2022-10-19 | Stop reason: HOSPADM

## 2022-10-19 RX ORDER — SODIUM CHLORIDE, SODIUM LACTATE, POTASSIUM CHLORIDE, CALCIUM CHLORIDE 600; 310; 30; 20 MG/100ML; MG/100ML; MG/100ML; MG/100ML
1000 INJECTION, SOLUTION INTRAVENOUS CONTINUOUS
Status: DISCONTINUED | OUTPATIENT
Start: 2022-10-19 | End: 2022-10-19 | Stop reason: HOSPADM

## 2022-10-19 RX ORDER — FENTANYL CITRATE 50 UG/ML
25 INJECTION, SOLUTION INTRAMUSCULAR; INTRAVENOUS
Status: DISCONTINUED | OUTPATIENT
Start: 2022-10-19 | End: 2022-10-19 | Stop reason: HOSPADM

## 2022-10-19 RX ORDER — DEXTROSE MONOHYDRATE 25 G/50ML
12.5 INJECTION, SOLUTION INTRAVENOUS AS NEEDED
Status: DISCONTINUED | OUTPATIENT
Start: 2022-10-19 | End: 2022-10-19 | Stop reason: HOSPADM

## 2022-10-19 RX ORDER — FENTANYL CITRATE 50 UG/ML
INJECTION, SOLUTION INTRAMUSCULAR; INTRAVENOUS AS NEEDED
Status: DISCONTINUED | OUTPATIENT
Start: 2022-10-19 | End: 2022-10-19 | Stop reason: SURG

## 2022-10-19 RX ORDER — SODIUM CHLORIDE, SODIUM LACTATE, POTASSIUM CHLORIDE, CALCIUM CHLORIDE 600; 310; 30; 20 MG/100ML; MG/100ML; MG/100ML; MG/100ML
9 INJECTION, SOLUTION INTRAVENOUS CONTINUOUS
Status: DISCONTINUED | OUTPATIENT
Start: 2022-10-19 | End: 2022-10-19 | Stop reason: HOSPADM

## 2022-10-19 RX ORDER — PROMETHAZINE HYDROCHLORIDE 25 MG/1
12.5 TABLET ORAL ONCE AS NEEDED
Status: DISCONTINUED | OUTPATIENT
Start: 2022-10-19 | End: 2022-10-19 | Stop reason: HOSPADM

## 2022-10-19 RX ORDER — SODIUM CHLORIDE 0.9 % (FLUSH) 0.9 %
10 SYRINGE (ML) INJECTION EVERY 12 HOURS SCHEDULED
Status: DISCONTINUED | OUTPATIENT
Start: 2022-10-19 | End: 2022-10-19 | Stop reason: HOSPADM

## 2022-10-19 RX ORDER — SODIUM CHLORIDE 0.9 % (FLUSH) 0.9 %
3 SYRINGE (ML) INJECTION AS NEEDED
Status: DISCONTINUED | OUTPATIENT
Start: 2022-10-19 | End: 2022-10-19 | Stop reason: HOSPADM

## 2022-10-19 RX ADMIN — FENTANYL CITRATE 25 MCG: 50 INJECTION INTRAMUSCULAR; INTRAVENOUS at 12:11

## 2022-10-19 RX ADMIN — FENTANYL CITRATE 25 MCG: 50 INJECTION INTRAMUSCULAR; INTRAVENOUS at 12:07

## 2022-10-19 RX ADMIN — SCOPALAMINE 1 PATCH: 1 PATCH, EXTENDED RELEASE TRANSDERMAL at 10:38

## 2022-10-19 RX ADMIN — GENTAMICIN SULFATE 80 MG: 80 INJECTION, SOLUTION INTRAVENOUS at 11:17

## 2022-10-19 RX ADMIN — FENTANYL CITRATE 50 MCG: 50 INJECTION INTRAMUSCULAR; INTRAVENOUS at 11:32

## 2022-10-19 RX ADMIN — PROPOFOL 200 MG: 10 INJECTION, EMULSION INTRAVENOUS at 11:22

## 2022-10-19 RX ADMIN — HYDROCODONE BITARTRATE AND ACETAMINOPHEN 1 TABLET: 5; 325 TABLET ORAL at 13:06

## 2022-10-19 RX ADMIN — DEXAMETHASONE SODIUM PHOSPHATE 4 MG: 4 INJECTION INTRA-ARTICULAR; INTRALESIONAL; INTRAMUSCULAR; INTRAVENOUS; SOFT TISSUE at 10:38

## 2022-10-19 RX ADMIN — SODIUM CHLORIDE, POTASSIUM CHLORIDE, SODIUM LACTATE AND CALCIUM CHLORIDE 1000 ML: 600; 310; 30; 20 INJECTION, SOLUTION INTRAVENOUS at 10:12

## 2022-10-19 RX ADMIN — LIDOCAINE HYDROCHLORIDE 100 MG: 20 INJECTION, SOLUTION EPIDURAL; INFILTRATION; INTRACAUDAL; PERINEURAL at 11:22

## 2022-10-19 RX ADMIN — MIDAZOLAM 2 MG: 1 INJECTION INTRAMUSCULAR; INTRAVENOUS at 11:09

## 2022-10-19 NOTE — ANESTHESIA PREPROCEDURE EVALUATION
Anesthesia Evaluation     Patient summary reviewed   history of anesthetic complications (claustrophobic to mask): PONV  NPO Solid Status: > 8 hours             Airway   Mallampati: II  TM distance: >3 FB  Neck ROM: full  Dental    (+) upper dentures    Pulmonary    (-) COPD, asthma, sleep apnea, not a smoker  Cardiovascular   Exercise tolerance: excellent (>7 METS)    (+) hypertension,   (-) pacemaker, past MI, angina, cardiac stents      Neuro/Psych  (-) seizures, TIA, CVA  GI/Hepatic/Renal/Endo    (+)   renal disease stones, thyroid problem hypothyroidism  (-) GERD, liver disease, diabetes    Musculoskeletal     Abdominal    Substance History      OB/GYN          Other                        Anesthesia Plan    ASA 2     general     intravenous induction     Anesthetic plan, risks, benefits, and alternatives have been provided, discussed and informed consent has been obtained with: patient.        CODE STATUS:

## 2022-10-19 NOTE — ANESTHESIA PROCEDURE NOTES
Airway  Urgency: elective    Airway not difficult    General Information and Staff    Patient location during procedure: OR  CRNA/CAA: Valeriy Paige CRNA    Indications and Patient Condition  Indications for airway management: airway protection    Preoxygenated: yes  Mask difficulty assessment: 1 - vent by mask    Final Airway Details  Final airway type: supraglottic airway      Successful airway: classic and I-gel  Size 3     Number of attempts at approach: 1

## 2022-10-19 NOTE — ANESTHESIA POSTPROCEDURE EVALUATION
Patient: Mary Medley    Procedure Summary     Date: 10/19/22 Room / Location:  [facility] OR 08 /  [facility]    Anesthesia Start: 1120 Anesthesia Stop: 1217    Procedure: EXTRACORPOREAL SHOCKWAVE LITHOTRIPSY-right (Right) Diagnosis:       Kidney stones      (Kidney stones [N20.0])    Surgeons: Leonardo Luna MD Provider: Valeriy Paige CRNA    Anesthesia Type: general ASA Status: 2          Anesthesia Type: general    Vitals  Vitals Value Taken Time   /85 10/19/22 1301   Temp 97.8 °F (36.6 °C) 10/19/22 1213   Pulse 77 10/19/22 1304   Resp 10 10/19/22 1225   SpO2 96 % 10/19/22 1304   Vitals shown include unvalidated device data.        Post Anesthesia Care and Evaluation    PONV Status: none  Comments: Patient d/c from PACU prior to anes eval based on Eduardo score.  Please see RN notes for details of d/c criteria.    Blood pressure 135/85, pulse 78, temperature 97.8 °F (36.6 °C), temperature source Temporal, resp. rate 10, SpO2 95 %, not currently breastfeeding.

## 2022-10-19 NOTE — OP NOTE
OP NOTE  Mary Medley    Date of Procedure: 10/19/2022    Pre-op Diagnosis:   Kidney stones [N20.0]    Post-op Diagnosis:     Post-Op Diagnosis Codes:     * Kidney stones [N20.0]    Procedure/CPT® Codes:      Procedure(s):  EXTRACORPOREAL SHOCKWAVE LITHOTRIPSY-right    Surgeon(s):  Leonardo Luna MD    Anesthesia: General    Staff:   Circulator: Trena Valle RN  Scrub Person: Madelin Schroeder  Vendor Representative: Costa Shepherd  Assistant: Adam Haro    Indications:   Right renal calclus.   After discussion of options, patient has given informed consent to undergo right ESWL.  All risks, benefits, and alternatives were discussed.    Operative Report: The patient is identified. The KUB is reviewed. After answering any questions, patient was brought to the operating room and [facility]  General endotracheal anesthesia was administered.  Preoperative KUB was reviewed.   An [facility] c-arm fluoroscope was used to identify the stone.    The shock-head is brought into position.  This stone is brought into the F2 plane for focus.  Treatment was initiated.  We gradually increased the energy level from 1 to 7.  This stone was treated at a rate of 90.  We paused for 2 minutes after 300 shocks to reduce the risk of renal damage.  The stone was periodically checked to make sure that we were on it and getting good breakup.  We checked at 700, 1000, 1500, 2000, and 2500 shocks.  The stone did appear to have good breakup.  I felt it was unnecessary to place a ureteral stent.  At the conclusion of 3000 shocks, the patient was awakened from anesthesia and transferred to the recovery room in satisfactory condition.      Estimated Blood Loss: <30 mL    Specimens:                None      Drains: * No LDAs found *    Complications: none  Plan: Repeat KUB in a few weeks. Home today if no evidence of infection or significant bleeding.     Leonardo Luna MD     Date: 10/19/2022  Time:  12:05 CDT

## 2022-10-19 NOTE — H&P
Urology H&P    Ms. Medley is 62 y.o. female    CHIEF COMPLAINT: Here for ESWL    HPI  Patient with right interpolar 11 mm calculus.  Denies flank pain.  He is here for definitive management    The following portions of the patient's history were reviewed and updated as appropriate: allergies, current medications, past family history, past medical history, past social history, past surgical history and problem list.    Review of Systems  Review  of systems  Constitutional: Negative for chills and fever.   Gastrointestinal: Negative for abdominal pain, anal bleeding and blood in stool.   Genitourinary: Negative for flank pain and hematuria.      Medications Prior to Admission   Medication Sig Dispense Refill Last Dose   • acetaminophen (TYLENOL) 325 MG tablet Take 300 mg by mouth 3 (Three) Times a Day.      • amLODIPine (NORVASC) 5 MG tablet Take 5 mg by mouth Daily.      • baclofen (LIORESAL) 10 MG tablet TAKE 1/2 TO 1 (ONE-HALF TO ONE) TABLET BY MOUTH THREE TIMES DAILY AS NEEDED      • brimonidine (ALPHAGAN) 0.2 % ophthalmic solution Administer 1 drop into the left eye 2 (Two) Times a Day.      • diazePAM (VALIUM) 2 MG tablet As Needed.      • diclofenac sodium (VOTAREN XR) 100 MG 24 hr tablet Take 100 mg by mouth Daily.      • estradiol (ESTRACE) 2 MG tablet Take 2 mg by mouth Daily.      • fingolimod (GILENYA) 0.5 MG capsule Take 0.5 mg by mouth Daily.      • gabapentin (NEURONTIN) 800 MG tablet Take 900 mg by mouth 3 (Three) Times a Day.      • HYDROcodone-acetaminophen (NORCO) 5-325 MG per tablet Take 1 tablet by mouth 3 (Three) Times a Day.      • levothyroxine (SYNTHROID, LEVOTHROID) 125 MCG tablet Take 125 mcg by mouth Daily.      • traZODone (DESYREL) 150 MG tablet Take 300 mg by mouth Every Night.          No current facility-administered medications for this encounter.    Past Medical History:   Diagnosis Date   • Arthritis    • Disease of thyroid gland    • Glaucoma    • Glaucoma    • History of  transfusion    • Hypertension    • MS (multiple sclerosis) (HCC)    • PONV (postoperative nausea and vomiting)        Past Surgical History:   Procedure Laterality Date   • APPENDECTOMY     •  SECTION     • CHOLECYSTECTOMY     • COLONOSCOPY N/A 2020    Procedure: COLONOSCOPY WITH ANESTHESIA;  Surgeon: Eduard Taylor MD;  Location: [facility] ENDOSCOPY;  Service: Gastroenterology;  Laterality: N/A;  pre: screen  post: normal   Jose Ramon Agarwal MD   • ELBOW ARTHROSCOPY Left 2019   • ENDOSCOPY N/A 2020    Procedure: ESOPHAGOGASTRODUODENOSCOPY WITH ANESTHESIA;  Surgeon: Eduard Taylor MD;  Location: [facility] ENDOSCOPY;  Service: Gastroenterology;  Laterality: N/A;  pre: Pharyngoesophageal dysphagia  post: esophagitis   Jose Ramon Agarwal MD   • HAND RECONSTRUCTION Right    • HYSTERECTOMY     • JOINT REPLACEMENT Left    • OOPHORECTOMY     • REPLACEMENT TOTAL KNEE Left    • TONSILLECTOMY         Social History     Socioeconomic History   • Marital status:    Tobacco Use   • Smoking status: Former     Years: 3.00     Types: Cigarettes     Quit date:      Years since quittin.8   • Smokeless tobacco: Never   Vaping Use   • Vaping Use: Never used   Substance and Sexual Activity   • Alcohol use: Yes     Comment: occ   • Drug use: No   • Sexual activity: Defer       Family History   Problem Relation Age of Onset   • Breast cancer Maternal Aunt    • Heart failure Mother    • Heart disease Father    • Heart attack Father    • No Known Problems Sister    • No Known Problems Brother    • No Known Problems Daughter    • No Known Problems Son    • No Known Problems Maternal Grandmother    • No Known Problems Paternal Grandmother    • No Known Problems Paternal Aunt    • No Known Problems Other    • BRCA 1/2 Neg Hx    • Colon cancer Neg Hx    • Endometrial cancer Neg Hx    • Ovarian cancer Neg Hx    • Colon polyps Neg Hx        There were no vitals taken for this visit.    Physical  Exam  Constitutional:      ] Well developed, well nourished, no distress  Respiratory:   Effort unlabored; Movements symmetric  GI:   No mass or hernia noted, not distended or tender   No enlargement of spleen or liver noted  Skin:   No pallor or cyanosis; No obvious rash  Psych:   Alert, Oriented x 3         Lab Results   Component Value Date    GLUCOSE 88 10/17/2022    BUN 19 10/17/2022    CREATININE 0.62 10/17/2022    EGFRIFNONA 86 09/08/2021    BCR 30.6 (H) 10/17/2022    CO2 29.0 10/17/2022    CALCIUM 9.8 10/17/2022    ALBUMIN 4.10 04/18/2022    AST 22 04/18/2022    ALT 18 04/18/2022     Lab Results   Component Value Date    GLUCOSE 88 10/17/2022    CALCIUM 9.8 10/17/2022     10/17/2022    K 4.4 10/17/2022    CO2 29.0 10/17/2022     10/17/2022    BUN 19 10/17/2022    CREATININE 0.62 10/17/2022    EGFRIFNONA 86 09/08/2021    BCR 30.6 (H) 10/17/2022    ANIONGAP 9.0 10/17/2022     Lab Results   Component Value Date    WBC 5.96 10/17/2022    HGB 12.9 10/17/2022    HCT 39.0 10/17/2022    MCV 99.0 (H) 10/17/2022     10/17/2022     No results found for: PSA  No results found for: URINECX  Brief Urine Lab Results  (Last result in the past 365 days)      Color   Clarity   Blood   Leuk Est   Nitrite   Protein   CREAT   Urine HCG        08/24/22 0915 Yellow   Clear   Negative   Negative   Negative   Negative                   Imaging Results (Last 7 Days)     ** No results found for the last 168 hours. **          Assessment and Plan  Right renal calculus  -Plan      (Please note that portions of this note were completed with a voice recognition program.)  Leonardo Luna MD  10/19/22  08:17 CDT

## 2022-11-02 ENCOUNTER — TELEPHONE (OUTPATIENT)
Dept: UROLOGY | Facility: CLINIC | Age: 62
End: 2022-11-02

## 2022-11-03 ENCOUNTER — OFFICE VISIT (OUTPATIENT)
Dept: UROLOGY | Facility: CLINIC | Age: 62
End: 2022-11-03

## 2022-11-03 ENCOUNTER — HOSPITAL ENCOUNTER (OUTPATIENT)
Dept: GENERAL RADIOLOGY | Facility: HOSPITAL | Age: 62
Discharge: HOME | End: 2022-11-03
Admitting: UROLOGY

## 2022-11-03 VITALS — TEMPERATURE: 95.2 F | BODY MASS INDEX: 29.8 KG/M2 | WEIGHT: 168.2 LBS | HEIGHT: 63 IN

## 2022-11-03 DIAGNOSIS — N20.0 KIDNEY STONES: ICD-10-CM

## 2022-11-03 DIAGNOSIS — N20.0 KIDNEY STONES: Primary | ICD-10-CM

## 2022-11-03 LAB
BILIRUB BLD-MCNC: NEGATIVE MG/DL
CLARITY, POC: CLEAR
COLOR UR: YELLOW
GLUCOSE UR STRIP-MCNC: NEGATIVE MG/DL
KETONES UR QL: NEGATIVE
LEUKOCYTE EST, POC: NEGATIVE
NITRITE UR-MCNC: NEGATIVE MG/ML
PH UR: 5.5 [PH] (ref 5–8)
PROT UR STRIP-MCNC: NEGATIVE MG/DL
RBC # UR STRIP: NEGATIVE /UL
SP GR UR: 1.03 (ref 1–1.03)
UROBILINOGEN UR QL: NORMAL

## 2022-11-03 PROCEDURE — 74018 RADEX ABDOMEN 1 VIEW: CPT

## 2022-11-03 PROCEDURE — 81003 URINALYSIS AUTO W/O SCOPE: CPT | Performed by: UROLOGY

## 2022-11-03 PROCEDURE — 99024 POSTOP FOLLOW-UP VISIT: CPT | Performed by: UROLOGY

## 2022-11-03 NOTE — PATIENT INSTRUCTIONS
-We recommend increasing the fluid intake so that a urine volume will be somewhere between 2-4 L/day.  To accomplish this will require a special effort to hydrate during the evening hours to produce nocturia.  This is probably the most challenging portion of hydrating to prevent stones.  It is recommended that you have two  8 ounce glasses of water between supper and bedtime.  Another 8 ounce glass should be consumed when you get up to go to the bathroom during the night.  It is explained that water hardness does not seem to predispose to stone formation and epidemiologic studies indicate the incidence of stones is lower and hard water regions then in soft water regions.  With regard to other types of fluids it is best to avoid large amounts of tea, cocoa, cola drinks, and fruit juice(excluding lemonade), which contain significant amounts of oxalate in soluble form. You can use a small amount of  Lemonade and/or soft drinks that are high in citrate (Diet Sprite, Diet 7-UP, Diet [place], Diet Mountain Dew, Diet Anderson Yellow) to increase the urine volume as well as the amount of citrate that is in the urine.      With adequate hydration you should notice .........   -The urine should remain colorless.   -Urine specific gravity should measure between 1.005-1.010 on any urinalysis.   -Direct volume measurement should be greater than 2 L.. Avoid tea, excessive amounts of caffeine and alcohol.     You should consider measuring your urine output to make at least 2.5 liters of urine per day. Be sure to hydrate when urine appears dark, concentrated or excessively colored.     Most patients do not need to restrict calcium in your diet. A moderate amount of calcium is believed to reduce calcium oxalate absorption in the intestine.     Limit the amount of non-dairy protein consumed to two palm sized servings per day.     Limit sodium intake to 2 grams each day.     Increase number of servings with fruits and vegetables to make  urine less acidic (more basic) and increase citrate in the urine.     High-oxalate foods to limit, if you eat them, are:   Spinach.   Bran flakes.   Rhubarb.   Beets.   Potato chips.   French fries.   Nuts and nut butters.  Tea  Chocolate

## 2023-05-30 ENCOUNTER — APPOINTMENT (OUTPATIENT)
Dept: CT IMAGING | Age: 63
End: 2023-05-30
Payer: MEDICARE

## 2023-05-30 ENCOUNTER — HOSPITAL ENCOUNTER (EMERGENCY)
Age: 63
Discharge: HOME | End: 2023-05-30
Attending: EMERGENCY MEDICINE
Payer: MEDICARE

## 2023-05-30 VITALS
TEMPERATURE: 98.5 F | HEART RATE: 77 BPM | OXYGEN SATURATION: 97 % | RESPIRATION RATE: 18 BRPM | DIASTOLIC BLOOD PRESSURE: 82 MMHG | SYSTOLIC BLOOD PRESSURE: 158 MMHG

## 2023-05-30 DIAGNOSIS — S09.90XA CLOSED HEAD INJURY, INITIAL ENCOUNTER: Primary | ICD-10-CM

## 2023-05-30 DIAGNOSIS — S16.1XXA STRAIN OF NECK MUSCLE, INITIAL ENCOUNTER: ICD-10-CM

## 2023-05-30 PROCEDURE — 72125 CT NECK SPINE W/O DYE: CPT

## 2023-05-30 PROCEDURE — 70450 CT HEAD/BRAIN W/O DYE: CPT

## 2023-05-30 PROCEDURE — 99284 EMERGENCY DEPT VISIT MOD MDM: CPT

## 2023-05-30 RX ORDER — BACLOFEN 10 MG/1
TABLET ORAL
COMMUNITY
Start: 2022-06-30

## 2023-05-30 RX ORDER — DIMETHYL FUMARATE 240 MG/1
240 CAPSULE ORAL 2 TIMES DAILY
COMMUNITY

## 2023-05-30 RX ORDER — HYDROCODONE BITARTRATE AND ACETAMINOPHEN 5; 325 MG/1; MG/1
1 TABLET ORAL EVERY 8 HOURS PRN
COMMUNITY

## 2023-05-30 RX ORDER — AMLODIPINE BESYLATE 5 MG/1
5 TABLET ORAL DAILY
COMMUNITY

## 2023-05-30 RX ORDER — ESTRADIOL 1 MG/1
2 TABLET ORAL DAILY
COMMUNITY

## 2023-05-30 ASSESSMENT — ENCOUNTER SYMPTOMS
RHINORRHEA: 0
SORE THROAT: 0
VOMITING: 0
DIARRHEA: 0
BACK PAIN: 0
NAUSEA: 0
COUGH: 0
SHORTNESS OF BREATH: 0
ABDOMINAL PAIN: 0

## 2023-05-30 ASSESSMENT — PAIN - FUNCTIONAL ASSESSMENT: PAIN_FUNCTIONAL_ASSESSMENT: 0-10

## 2023-05-30 ASSESSMENT — PAIN DESCRIPTION - LOCATION: LOCATION: HEAD

## 2023-05-30 ASSESSMENT — PAIN SCALES - GENERAL: PAINLEVEL_OUTOF10: 5

## 2023-05-30 NOTE — ED PROVIDER NOTES
abnormal muscle tone. DIAGNOSTIC RESULTS         RADIOLOGY:  Non-plain film images such as CT, Ultrasound and MRI are read by the radiologist. Plain radiographic images are visualized and preliminarily interpreted bythe emergency physician with the below findings:      CT CERVICAL SPINE WO CONTRAST   Final Result   No acute osseous abnormality of the cervical spine. CT HEAD WO CONTRAST   Final Result   1. No acute intracranial abnormality identified. LABS:  Labs Reviewed - No data to display    All other labs were within normal range or not returned as of this dictation. EMERGENCY DEPARTMENT COURSE and DIFFERENTIAL DIAGNOSIS/MDM:   Vitals:    Vitals:    05/30/23 1019 05/30/23 1020   BP:  (!) 158/82   Pulse: 77    Resp: 18    Temp: 98.5 °F (36.9 °C)    SpO2: 97%        MDM     Amount and/or Complexity of Data Reviewed  Tests in the radiology section of CPT®: ordered and reviewed  Independent visualization of images, tracings, or specimens: yes      VSS, pt in NAD, fall 2 days ago here with headache and neck pain but well appearing, ct head and neck reassuring, suspect mild concussive symptoms and cervical strain, discussed ongoing supportive care and return precautions      CONSULTS:  None    :  Unless otherwise noted below, none     Procedures    FINAL IMPRESSION      1. Closed head injury, initial encounter    2.  Strain of neck muscle, initial encounter          DISPOSITION/PLAN   DISPOSITION Decision To Discharge 05/30/2023 12:29:54 PM      PATIENT REFERRED TO:  Sosa Diaz MD  [address] - [facility] EMERGENCY DEPT  [place]  [phone]          DISCHARGE MEDICATIONS:  Discharge Medication List as of 5/30/2023 12:34 PM             (Please note that portions of this note were completed with a voice recognition program.  Efforts were made to edit thedictations but occasionally words are

## 2023-08-03 ENCOUNTER — OFFICE VISIT (OUTPATIENT)
Dept: GASTROENTEROLOGY | Facility: CLINIC | Age: 63
End: 2023-08-03
Payer: MEDICARE

## 2023-08-03 VITALS
WEIGHT: 166 LBS | HEART RATE: 72 BPM | BODY MASS INDEX: 28.34 KG/M2 | TEMPERATURE: 97.8 F | DIASTOLIC BLOOD PRESSURE: 70 MMHG | OXYGEN SATURATION: 98 % | HEIGHT: 64 IN | SYSTOLIC BLOOD PRESSURE: 132 MMHG

## 2023-08-03 DIAGNOSIS — R13.14 PHARYNGOESOPHAGEAL DYSPHAGIA: Primary | ICD-10-CM

## 2023-08-03 PROCEDURE — 3075F SYST BP GE 130 - 139MM HG: CPT | Performed by: NURSE PRACTITIONER

## 2023-08-03 PROCEDURE — 99214 OFFICE O/P EST MOD 30 MIN: CPT | Performed by: NURSE PRACTITIONER

## 2023-08-03 PROCEDURE — 3078F DIAST BP <80 MM HG: CPT | Performed by: NURSE PRACTITIONER

## 2023-08-03 PROCEDURE — 1160F RVW MEDS BY RX/DR IN RCRD: CPT | Performed by: NURSE PRACTITIONER

## 2023-08-03 PROCEDURE — 1159F MED LIST DOCD IN RCRD: CPT | Performed by: NURSE PRACTITIONER

## 2023-08-03 NOTE — H&P (VIEW-ONLY)
[facility] GASTROENTEROLOGY - OFFICE NOTE    8/3/2023    Mary Medley   1960    Primary Physician: Jose Ramon Madrigal MD    Chief Complaint   Patient presents with    Difficulty Swallowing         HISTORY OF PRESENT ILLNESS:     Mary Medley is a 63 y.o. female presents  with dysphagia that started around 3 mo ago.  Note with solid foods.  She points to the neck area where this occurs.  Taking a drink helps.  Started prilosec 3 weeks. Was only having occasional heartburn. No weight loss. No abdominal pain. No fever.         Has had a cough , non productive. Clearing throat helps.         UPPER GI ENDOSCOPY (2020 10:26)       Past Medical History:   Diagnosis Date    Arthritis     Claustrophobia     Disease of thyroid gland     Glaucoma     Glaucoma     History of transfusion     Hypertension     MS (multiple sclerosis)     PONV (postoperative nausea and vomiting)        Past Surgical History:   Procedure Laterality Date    APPENDECTOMY       SECTION      CHOLECYSTECTOMY      COLONOSCOPY N/A 2020    Procedure: COLONOSCOPY WITH ANESTHESIA;  Surgeon: Eduard Taylor MD;  Location: [facility] ENDOSCOPY;  Service: Gastroenterology;  Laterality: N/A;  pre: screen  post: normal   Jose Ramon Agarwal MD    ELBOW ARTHROSCOPY Left 2019    ENDOSCOPY N/A 2020    Procedure: ESOPHAGOGASTRODUODENOSCOPY WITH ANESTHESIA;  Surgeon: Eduard Taylor MD;  Location: [facility] ENDOSCOPY;  Service: Gastroenterology;  Laterality: N/A;  pre: Pharyngoesophageal dysphagia  post: esophagitis   Jose Ramon Agarwal MD    EXTRACORPOREAL SHOCK WAVE LITHOTRIPSY (ESWL) Right 10/19/2022    Procedure: EXTRACORPOREAL SHOCKWAVE LITHOTRIPSY-right;  Surgeon: Leonardo Luna MD;  Location: [facility] OR;  Service: Urology;  Laterality: Right;    HAND RECONSTRUCTION Right     HYSTERECTOMY      JOINT REPLACEMENT Left     OOPHORECTOMY      REPLACEMENT TOTAL KNEE Left     TONSILLECTOMY         Outpatient  Medications Marked as Taking for the 8/3/23 encounter (Office Visit) with Pearl Dodson APRN   Medication Sig Dispense Refill    acetaminophen (TYLENOL) 325 MG tablet Take 300 mg by mouth 3 (Three) Times a Day.      amLODIPine (NORVASC) 5 MG tablet Take 1 tablet by mouth Daily.      baclofen (LIORESAL) 10 MG tablet TAKE 1/2 TO 1 (ONE-HALF TO ONE) TABLET BY MOUTH THREE TIMES DAILY AS NEEDED      brimonidine (ALPHAGAN) 0.2 % ophthalmic solution Administer 1 drop into the left eye 2 (Two) Times a Day.      diazePAM (VALIUM) 2 MG tablet Take 1 tablet by mouth As Needed.      Dimethyl Fumarate (TECFIDERA) capsule delayed-release Take 1 capsule by mouth.      gabapentin (NEURONTIN) 300 MG capsule Take 1 capsule by mouth 3 (Three) Times a Day.      gabapentin (NEURONTIN) 600 MG tablet Take 1 tablet by mouth 3 (Three) Times a Day.      HYDROcodone-acetaminophen (NORCO) 5-325 MG per tablet Take 1 tablet by mouth 3 (Three) Times a Day.      levothyroxine (SYNTHROID, LEVOTHROID) 125 MCG tablet Take 1 tablet by mouth Daily.      olmesartan (BENICAR) 40 MG tablet Take 1 tablet by mouth Daily.      traZODone (DESYREL) 150 MG tablet Take 2 tablets by mouth Every Night.         Allergies   Allergen Reactions    Reglan [Metoclopramide] Anxiety    Cephalosporins Rash and Other (See Comments)     Per [facility] pt is allergic    Ciprofloxacin Rash    Erythromycin Rash    Macrolides And Ketolides Rash and Other (See Comments)     Per [facility] pt has allergy    Morphine Irritability     Patient states she gets mean.    Penicillins Rash    Quinolones Other (See Comments)     Per [facility] pt is allergic    Sulfa Antibiotics Rash    Thorazine [Chlorpromazine] Anxiety    Zofran [Ondansetron Hcl] Itching       Social History     Socioeconomic History    Marital status:    Tobacco Use    Smoking status: Former     Years: 3.00     Types: Cigarettes     Quit date:      Years since quittin.6    Smokeless  "tobacco: Never   Vaping Use    Vaping Use: Never used   Substance and Sexual Activity    Alcohol use: Yes     Comment: occ    Drug use: No    Sexual activity: Defer       Family History   Problem Relation Age of Onset    Breast cancer Maternal Aunt     Heart failure Mother     Heart disease Father     Heart attack Father     No Known Problems Sister     No Known Problems Brother     No Known Problems Daughter     No Known Problems Son     No Known Problems Maternal Grandmother     No Known Problems Paternal Grandmother     No Known Problems Paternal Aunt     No Known Problems Other     BRCA 1/2 Neg Hx     Colon cancer Neg Hx     Endometrial cancer Neg Hx     Ovarian cancer Neg Hx     Colon polyps Neg Hx        Review of Systems   Constitutional:  Negative for chills, fever and unexpected weight change.   Respiratory:  Negative for shortness of breath.    Cardiovascular:  Negative for chest pain.   Gastrointestinal:  Negative for abdominal distention, abdominal pain, anal bleeding, blood in stool, constipation, diarrhea, nausea and vomiting.      Vitals:    08/03/23 0857   BP: 132/70   Pulse: 72   Temp: 97.8 °F (36.6 °C)   SpO2: 98%   Weight: 75.3 kg (166 lb)   Height: 161.3 cm (63.5\")      Body mass index is 28.94 kg/m².    Physical Exam  Vitals reviewed.   Constitutional:       General: She is not in acute distress.  Cardiovascular:      Rate and Rhythm: Normal rate and regular rhythm.      Heart sounds: Normal heart sounds.   Pulmonary:      Effort: Pulmonary effort is normal.      Breath sounds: Normal breath sounds.   Abdominal:      General: Bowel sounds are normal. There is no distension.      Palpations: Abdomen is soft.      Tenderness: There is no abdominal tenderness.   Skin:     General: Skin is warm and dry.   Neurological:      Mental Status: She is alert.       Results for orders placed or performed in visit on 11/03/22   POC Urinalysis Dipstick, Multipro    Specimen: Urine   Result Value Ref Range    " Color Yellow Yellow, Straw, Dark Yellow, Jocelyne    Clarity, UA Clear Clear    Glucose, UA Negative Negative mg/dL    Bilirubin Negative Negative    Ketones, UA Negative Negative    Specific Gravity  1.030 1.005 - 1.030    Blood, UA Negative Negative    pH, Urine 5.5 5.0 - 8.0    Protein, POC Negative Negative mg/dL    Urobilinogen, UA Normal Normal, 0.2 E.U./dL    Nitrite, UA Negative Negative    Leukocytes Negative Negative           ASSESSMENT AND PLAN    Assessment & Plan     Diagnoses and all orders for this visit:    1. Pharyngoesophageal dysphagia (Primary)  -     Case Request; Standing  -     Case Request    Other orders  -     Implement Anesthesia Orders Day of Procedure; Standing  -     Obtain Informed Consent; Standing        In regards to dysphagia, differential diagnosis discussed.  I recommend cut food in small pieces and chew well.  I recommend upper endoscopy for further evaluation and the patient is agreeable.      ESOPHAGOGASTRODUODENOSCOPY WITH ANESTHESIA  Risk, benefits, and alternatives of endoscopy were explained in full.  They understand that there is a risk of bleeding, perforation, and infection.  The risk of perforation goes up with esophageal dilation.  Other options to evaluate UGI complaints could involve barium swallow or UGI series, but these would be diagnostic tests only.  Patient was given time to ask questions.  I answered them to their satisfaction and they are agreeable to proceeding         No follow-ups on file.        There are no Patient Instructions on file for this visit.      Pearl Dodson, APRN

## 2023-08-28 ENCOUNTER — ANESTHESIA (OUTPATIENT)
Dept: GASTROENTEROLOGY | Facility: HOSPITAL | Age: 63
End: 2023-08-28
Payer: MEDICARE

## 2023-08-28 ENCOUNTER — ANESTHESIA EVENT (OUTPATIENT)
Dept: GASTROENTEROLOGY | Facility: HOSPITAL | Age: 63
End: 2023-08-28
Payer: MEDICARE

## 2023-08-28 ENCOUNTER — HOSPITAL ENCOUNTER (OUTPATIENT)
Facility: HOSPITAL | Age: 63
Setting detail: HOSPITAL OUTPATIENT SURGERY
Discharge: HOME | End: 2023-08-28
Attending: INTERNAL MEDICINE | Admitting: INTERNAL MEDICINE
Payer: MEDICARE

## 2023-08-28 VITALS
WEIGHT: 160 LBS | OXYGEN SATURATION: 97 % | TEMPERATURE: 97.7 F | SYSTOLIC BLOOD PRESSURE: 99 MMHG | DIASTOLIC BLOOD PRESSURE: 46 MMHG | HEART RATE: 71 BPM | RESPIRATION RATE: 12 BRPM | HEIGHT: 63 IN | BODY MASS INDEX: 28.35 KG/M2

## 2023-08-28 PROCEDURE — 43248 EGD GUIDE WIRE INSERTION: CPT | Performed by: INTERNAL MEDICINE

## 2023-08-28 PROCEDURE — 25010000002 PROPOFOL 1000 MG/100ML EMULSION: Performed by: NURSE ANESTHETIST, CERTIFIED REGISTERED

## 2023-08-28 RX ORDER — OXCARBAZEPINE 600 MG/1
600 TABLET, FILM COATED ORAL 2 TIMES DAILY
COMMUNITY

## 2023-08-28 RX ORDER — PROPOFOL 10 MG/ML
INJECTION, EMULSION INTRAVENOUS AS NEEDED
Status: DISCONTINUED | OUTPATIENT
Start: 2023-08-28 | End: 2023-08-28 | Stop reason: SURG

## 2023-08-28 RX ORDER — SODIUM CHLORIDE 9 MG/ML
500 INJECTION, SOLUTION INTRAVENOUS CONTINUOUS PRN
Status: DISCONTINUED | OUTPATIENT
Start: 2023-08-28 | End: 2023-08-28 | Stop reason: HOSPADM

## 2023-08-28 RX ORDER — SODIUM CHLORIDE 0.9 % (FLUSH) 0.9 %
10 SYRINGE (ML) INJECTION AS NEEDED
Status: DISCONTINUED | OUTPATIENT
Start: 2023-08-28 | End: 2023-08-28 | Stop reason: HOSPADM

## 2023-08-28 RX ORDER — SODIUM CHLORIDE 0.9 % (FLUSH) 0.9 %
10 SYRINGE (ML) INJECTION AS NEEDED
Status: CANCELLED | OUTPATIENT
Start: 2023-08-28

## 2023-08-28 RX ORDER — SODIUM CHLORIDE 9 MG/ML
100 INJECTION, SOLUTION INTRAVENOUS CONTINUOUS
Status: CANCELLED | OUTPATIENT
Start: 2023-08-28

## 2023-08-28 RX ORDER — LIDOCAINE HYDROCHLORIDE 10 MG/ML
0.5 INJECTION, SOLUTION EPIDURAL; INFILTRATION; INTRACAUDAL; PERINEURAL ONCE AS NEEDED
Status: CANCELLED | OUTPATIENT
Start: 2023-08-28

## 2023-08-28 RX ORDER — LIDOCAINE HYDROCHLORIDE 20 MG/ML
INJECTION, SOLUTION EPIDURAL; INFILTRATION; INTRACAUDAL; PERINEURAL AS NEEDED
Status: DISCONTINUED | OUTPATIENT
Start: 2023-08-28 | End: 2023-08-28 | Stop reason: SURG

## 2023-08-28 RX ORDER — SODIUM CHLORIDE 0.9 % (FLUSH) 0.9 %
10 SYRINGE (ML) INJECTION EVERY 12 HOURS SCHEDULED
Status: CANCELLED | OUTPATIENT
Start: 2023-08-28

## 2023-08-28 RX ORDER — SODIUM CHLORIDE 9 MG/ML
40 INJECTION, SOLUTION INTRAVENOUS AS NEEDED
Status: CANCELLED | OUTPATIENT
Start: 2023-08-28

## 2023-08-28 RX ADMIN — LIDOCAINE HYDROCHLORIDE 50 MG: 20 INJECTION, SOLUTION EPIDURAL; INFILTRATION; INTRACAUDAL; PERINEURAL at 12:07

## 2023-08-28 RX ADMIN — PROPOFOL 60 MG: 10 INJECTION, EMULSION INTRAVENOUS at 12:11

## 2023-08-28 RX ADMIN — PROPOFOL 70 MG: 10 INJECTION, EMULSION INTRAVENOUS at 12:09

## 2023-08-28 RX ADMIN — SODIUM CHLORIDE 500 ML: 9 INJECTION, SOLUTION INTRAVENOUS at 11:01

## 2023-08-28 RX ADMIN — PROPOFOL 70 MG: 10 INJECTION, EMULSION INTRAVENOUS at 12:07

## 2023-08-28 NOTE — ANESTHESIA PREPROCEDURE EVALUATION
Anesthesia Evaluation     Patient summary reviewed   history of anesthetic complications (claustrophobic to mask):  PONV  NPO Solid Status: > 8 hours             Airway   Mallampati: II  TM distance: >3 FB  Neck ROM: full  Dental    (+) upper dentures    Pulmonary    (-) COPD, asthma, sleep apnea, not a smoker  Cardiovascular   Exercise tolerance: excellent (>7 METS)    (+) hypertension  (-) pacemaker, past MI, angina, cardiac stents      Neuro/Psych  (+) neuromuscular disease (MS)  (-) seizures, TIA, CVA  GI/Hepatic/Renal/Endo    (+) renal disease stones, thyroid problem hypothyroidism  (-) GERD, liver disease, diabetes    Musculoskeletal     Abdominal    Substance History      OB/GYN          Other                        Anesthesia Plan    ASA 2     MAC     intravenous induction     Anesthetic plan, risks, benefits, and alternatives have been provided, discussed and informed consent has been obtained with: patient.      CODE STATUS:

## 2023-08-28 NOTE — ANESTHESIA POSTPROCEDURE EVALUATION
Patient: Mary Medley    Procedure Summary       Date: 08/28/23 Room / Location:  [facility] /  [facility]    Anesthesia Start: 1203 Anesthesia Stop: 1218    Procedure: ESOPHAGOGASTRODUODENOSCOPY WITH ANESTHESIA Diagnosis:       Pharyngoesophageal dysphagia      (Pharyngoesophageal dysphagia [R13.14])    Surgeons: Eduard Taylor MD Provider: Paxton Stringer CRNA    Anesthesia Type: MAC ASA Status: 2            Anesthesia Type: MAC    Vitals  Vitals Value Taken Time   BP 99/46 08/28/23 1217   Temp     Pulse 68 08/28/23 1219   Resp 12 08/28/23 1217   SpO2 94 % 08/28/23 1219   Vitals shown include unvalidated device data.        Post Anesthesia Care and Evaluation    Patient location during evaluation: PHASE II  Patient participation: complete - patient participated  Level of consciousness: awake  Pain score: 0  Pain management: adequate    Airway patency: patent  Anesthetic complications: No anesthetic complications  PONV Status: none  Cardiovascular status: acceptable  Respiratory status: acceptable  Hydration status: acceptable

## 2023-10-11 ENCOUNTER — TRANSCRIBE ORDERS (OUTPATIENT)
Dept: ADMINISTRATIVE | Facility: HOSPITAL | Age: 63
End: 2023-10-11
Payer: MEDICARE

## 2023-10-11 DIAGNOSIS — Z12.31 ENCOUNTER FOR SCREENING MAMMOGRAM FOR MALIGNANT NEOPLASM OF BREAST: Primary | ICD-10-CM

## 2023-10-23 NOTE — PROGRESS NOTES
YOB: 1960  Location: [facility]  Location Address: [address] [place] [address], [facility], [address] [place] [address]  Location Phone: [phone]    Chief Complaint   Patient presents with    Difficulty Swallowing     She is having problem swallowing pills and solids    Dysphonia     When you talk a lot or sick your throat hurts       History of Present Illness  Mary Medley is a 63 y.o. female.  Mary Medley is here for evaluation of ENT complaints. The patient has had problems with dysphagia and pain with talking and singing. She denies hoarseness.  She has had an endoscopy on 2023. She was started on omeprazole twice daily at that time but she states that she has only been taking this once daily. She also states esophageal dilation was recommended that she plans to set up at a later date.  She has a history of MS and is being followed by Dr. Jade. Her doctor feels that her MS could be affecting her neck/throat muscles.  She is a former smoker.    Past Medical History:   Diagnosis Date    Arthritis     Claustrophobia     Disease of thyroid gland     Glaucoma     Glaucoma     History of transfusion     Hypertension     MS (multiple sclerosis)        Past Surgical History:   Procedure Laterality Date    APPENDECTOMY       SECTION      CHOLECYSTECTOMY      COLONOSCOPY N/A 2020    Procedure: COLONOSCOPY WITH ANESTHESIA;  Surgeon: Eduard Taylor MD;  Location: [facility] ENDOSCOPY;  Service: Gastroenterology;  Laterality: N/A;  pre: screen  post: normal   Jose Ramon Agarwal MD    ELBOW ARTHROSCOPY Left 2019    ENDOSCOPY N/A 2020    Procedure: ESOPHAGOGASTRODUODENOSCOPY WITH ANESTHESIA;  Surgeon: Eduard Taylor MD;  Location: [facility] ENDOSCOPY;  Service: Gastroenterology;  Laterality: N/A;  pre: Pharyngoesophageal dysphagia  post: esophagitis   Jose Ramon Agarwal MD    ENDOSCOPY N/A 2023    Procedure: ESOPHAGOGASTRODUODENOSCOPY WITH ANESTHESIA;  Surgeon: Brandon  Eduard HILTON MD;  Location: [facility] ENDOSCOPY;  Service: Gastroenterology;  Laterality: N/A;  pre Pharyngoesophageal dysphagia  post: esophagus dilated  Dr.O Morse    EXTRACORPOREAL SHOCK WAVE LITHOTRIPSY (ESWL) Right 10/19/2022    Procedure: EXTRACORPOREAL SHOCKWAVE LITHOTRIPSY-right;  Surgeon: Leonardo Luna MD;  Location: [facility] OR;  Service: Urology;  Laterality: Right;    HAND RECONSTRUCTION Right     HYSTERECTOMY      JOINT REPLACEMENT Left     OOPHORECTOMY      REPLACEMENT TOTAL KNEE Left     TONSILLECTOMY         Outpatient Medications Marked as Taking for the 10/24/23 encounter (Office Visit) with Yunier Dodson MD   Medication Sig Dispense Refill    acetaminophen (TYLENOL) 325 MG tablet Take 300 mg by mouth 3 (Three) Times a Day.      amLODIPine (NORVASC) 5 MG tablet Take 1 tablet by mouth Daily.      baclofen (LIORESAL) 10 MG tablet TAKE 1/2 TO 1 (ONE-HALF TO ONE) TABLET BY MOUTH THREE TIMES DAILY AS NEEDED      brimonidine (ALPHAGAN) 0.2 % ophthalmic solution Administer 1 drop into the left eye 2 (Two) Times a Day.      diazePAM (VALIUM) 2 MG tablet Take 1 tablet by mouth As Needed.      diclofenac (VOLTAREN) 75 MG EC tablet Take 1 tablet by mouth Daily.      Dimethyl Fumarate (TECFIDERA) capsule delayed-release Take 1 capsule by mouth.      gabapentin (NEURONTIN) 300 MG capsule Take 1 capsule by mouth 3 (Three) Times a Day.      gabapentin (NEURONTIN) 600 MG tablet Take 1 tablet by mouth 3 (Three) Times a Day.      HYDROcodone-acetaminophen (NORCO) 5-325 MG per tablet Take 1 tablet by mouth 3 (Three) Times a Day.      levothyroxine (SYNTHROID, LEVOTHROID) 125 MCG tablet Take 1 tablet by mouth Daily.      olmesartan (BENICAR) 40 MG tablet Take 1 tablet by mouth Daily.      OXcarbazepine (TRILEPTAL) 600 MG tablet Take 1 tablet by mouth 2 (Two) Times a Day.      traZODone (DESYREL) 150 MG tablet Take 2 tablets by mouth Every Night.         Reglan [metoclopramide], Cephalosporins, Ciprofloxacin,  Erythromycin, Macrolides and ketolides, Morphine, Penicillins, Quinolones, Sulfa antibiotics, Thorazine [chlorpromazine], and Zofran [ondansetron hcl]    Family History   Problem Relation Age of Onset    Breast cancer Maternal Aunt     Heart failure Mother     Heart disease Father     Heart attack Father     No Known Problems Sister     No Known Problems Brother     No Known Problems Daughter     No Known Problems Son     No Known Problems Maternal Grandmother     No Known Problems Paternal Grandmother     No Known Problems Paternal Aunt     No Known Problems Other     BRCA 1/2 Neg Hx     Colon cancer Neg Hx     Endometrial cancer Neg Hx     Ovarian cancer Neg Hx     Colon polyps Neg Hx        Social History     Socioeconomic History    Marital status:    Tobacco Use    Smoking status: Former     Years: 3     Types: Cigarettes     Quit date:      Years since quittin.8    Smokeless tobacco: Never   Vaping Use    Vaping Use: Never used   Substance and Sexual Activity    Alcohol use: Yes     Comment: occ    Drug use: No    Sexual activity: Defer       Review of Systems   Constitutional: Negative.    HENT:  Positive for sore throat and trouble swallowing.    Respiratory: Negative.     Neurological: Negative.        Vitals:    10/24/23 1402   BP: 136/68   Pulse: 75   Temp: 98.4 °F (36.9 °C)       Body mass index is 27.78 kg/m².    Objective     Physical Exam  Vitals reviewed.   Constitutional:       Appearance: Normal appearance.   HENT:      Head: Normocephalic.      Right Ear: Tympanic membrane, ear canal and external ear normal.      Left Ear: Tympanic membrane, ear canal and external ear normal.      Nose: Nose normal.      Mouth/Throat:      Lips: Pink.      Mouth: Mucous membranes are moist.      Comments: See endoscopy  Musculoskeletal:      Cervical back: Full passive range of motion without pain.   Neurological:      Mental Status: She is alert.   Psychiatric:         Behavior: Behavior is  cooperative.         Assessment & Plan   Diagnoses and all orders for this visit:    1. Dysphagia, unspecified type (Primary)    2. Pain in throat    3. Laryngopharyngeal reflux (LPR)    Other orders  -     Omeprazole 20 MG tablet delayed-release; Take 20 mg by mouth 2 (Two) Times a Day for 120 days.  Dispense: 60 each; Refill: 3      * Surgery not found *  No orders of the defined types were placed in this encounter.    Increase omeprazole to twice daily  Reflux precautions  Recommend proceeding with GI recommendation of esophageal stretching  Return for problems    Dr. Dodson examined and discussed care with patient and agrees with treatment plan.     Return in about 6 months (around 4/24/2024).       Patient Instructions   Increase omeprazole to twice daily  Reflux precautions  Recommend proceeding with GI recommendation of esophageal stretching  Return for problems    Gastroesophageal Reflux Disease (Laryngopharyngeal Reflux), Adult  Gastroesophageal reflux disease (GERD) and/or Laryngopharyngeal Reflux, (LPR) happens when acid from your stomach flows up into the esophagus and/or throat and voicebox or larynx. When acid comes in contact with the these organs, the acid can cause soreness (inflammation). Over time, GERD may create small holes (ulcers) in the lining of the esophagus and may lead to the development of hoarseness, difficulty swallowing,   feeling of something stuck in the throat, increased mucous or drainage and even predispose to the development of malignancies, (cancer).    CAUSES   Increased body weight. This puts pressure on the stomach, making acid rise from the stomach into the esophagus.  Smoking. This increases acid production in the stomach.  Drinking alcohol. This causes decreased pressure in the lower esophageal sphincter (valve or ring of muscle between the esophagus and stomach), allowing acid from the stomach into the esophagus.  Late evening meals and a full stomach. This increases  pressure and acid production in the stomach.  A malformed lower esophageal sphincter  Diet which can include avoidance of gluten and dairy products  Age  SYMPTOMS   Burning pain in the lower part of the mid-chest behind the breastbone and in the mid-stomach area. This may occur twice a week or more often.  Trouble swallowing.  Sore throat.  Dry cough.  Asthma-like symptoms including chest tightness, shortness of breath, or wheezing.  Globus sensation-something stuck in the throat/fullness  Hoarseness  DIAGNOSIS   Your caregiver may be able to diagnose GERD based on your symptoms. In some cases, X-rays and other tests may be done to check for complications or to check the condition of your stomach and esophagus.  You may need to see another doctor.  TREATMENT   Over-the-counter or prescription medicines to help decrease acid production.   Dietary and behavioral modifications or changes may be also recommended.  HOME CARE INSTRUCTIONS   Change the factors that you can control. Ask your caregiver for guidance concerning weight loss, quitting smoking, and alcohol consumption.  Avoid foods and drinks that make your symptoms worse, and MAY include such as:  Caffeine or alcoholic drinks.  Chocolate.  Gluten containing foods  Dairy  Peppermint or mint flavorings.  Garlic and onions.  Spicy foods.  Citrus fruits, such as oranges, elmer, or limes.  Tomato-based foods such as sauce, chili, salsa, and pizza.  Fried and fatty foods.  Avoid lying down for the 3 hours prior to your bedtime or prior to taking a nap.  Eat small, frequent meals instead of large meals.  Wear loose-fitting clothing. Do not wear anything tight around your waist that causes pressure on your stomach.  Raise the head of your bed 6 to 8 inches with wood blocks to help you sleep. Extra pillows will not help.  Only take over-the-counter or prescription medicines for pain, discomfort, or fever as directed by your caregiver.  Do not take aspirin, ibuprofen, or  other nonsteroidal anti-inflammatory drugs if possible (NSAIDs).  SEEK IMMEDIATE MEDICAL CARE IF:   You have pain in your arms, neck, jaw, teeth, or back.  Your pain increases or changes in intensity or duration.  You develop nausea, vomiting, or sweating (diaphoresis).  You develop shortness of breath, or you faint.  Your vomit is green, yellow, black, or looks like coffee grounds or blood.  Your stool is red, bloody, or black.  These symptoms could be signs of other problems, such as heart disease, gastric bleeding, or esophageal bleeding.  MAKE SURE YOU:   Understand these instructions.  Will watch your condition.  Will get help right away if you are not doing well or get worse.     This information is not intended to replace advice given to you by your physician. Make sure you discuss any questions you have with your health care provider.     Modified by Yunier Dodson MD, FACS 9/8/2016.  Document Released: 09/27/2006 Document Revised: 01/08/2016 Document Reviewed: 04/13/2016  [employer] Interactive Patient Education ©2016 [employer]

## 2023-10-24 ENCOUNTER — OFFICE VISIT (OUTPATIENT)
Dept: OTOLARYNGOLOGY | Facility: CLINIC | Age: 63
End: 2023-10-24
Payer: MEDICARE

## 2023-10-24 VITALS
WEIGHT: 156.8 LBS | HEART RATE: 75 BPM | BODY MASS INDEX: 27.78 KG/M2 | HEIGHT: 63 IN | SYSTOLIC BLOOD PRESSURE: 136 MMHG | DIASTOLIC BLOOD PRESSURE: 68 MMHG | TEMPERATURE: 98.4 F

## 2023-10-24 DIAGNOSIS — R13.10 DYSPHAGIA, UNSPECIFIED TYPE: Primary | ICD-10-CM

## 2023-10-24 DIAGNOSIS — K21.9 LARYNGOPHARYNGEAL REFLUX (LPR): ICD-10-CM

## 2023-10-24 DIAGNOSIS — R07.0 PAIN IN THROAT: ICD-10-CM

## 2023-10-24 RX ORDER — DICLOFENAC SODIUM 75 MG/1
75 TABLET, DELAYED RELEASE ORAL DAILY
COMMUNITY
Start: 2023-08-23

## 2023-10-24 RX ORDER — NICOTINE POLACRILEX 4 MG/1
20 GUM, CHEWING ORAL 2 TIMES DAILY
Qty: 60 EACH | Refills: 3 | Status: SHIPPED | OUTPATIENT
Start: 2023-10-24 | End: 2024-02-21

## 2023-10-24 RX ORDER — OMEPRAZOLE 20 MG/1
20 CAPSULE, DELAYED RELEASE ORAL DAILY
COMMUNITY
End: 2023-10-24

## 2023-10-24 NOTE — PROGRESS NOTES
OPERATIVE NOTE:  Mary Medley    DATE OF PROCEDURE: 10/24/2023    PROCEDURE:   Flexible Fiberoptic Laryngoscopy    ANESTHESIA:  None    REASON FOR PROCEDURE:  Procedure was recommended for persistant hoarseness and suspicious clinical behavior  Risks, benefits and alternatives were discussed.      DETAILS of OPERATION:  The patient was seated in the exam chair.  A flexible fiberoptic laryngoscopy was performed through the oral cavity.  The scope was introduced into the oral cavity and directed to the level of the glottis, examining the structures of the oropharynx, base of tongue, vallecula, supraglottic larynx, glottic larynx, and hypopharynx.      FINDINGS:  Mucosal surfaces:   The mucosal surfaces demonstrated normal mucosa surfaces with moderate inflammation    Base of tongue:  The base of tongue was found to have no mass or lesion.    Epiglottis:  The epiglottis was found to have no mass or lesion.    Aryepiglottic fold:  The AE folds were found to have no mass or lesion.    False Vocal Fold:  The false cords were found to have no mass or lesion.    True Vocal Cord:  The true vocal cords were found to have no mass or lesion. Both true vocal cords adduct and abduct normally    Arytenoid:   The arytenoids were found to have moderate inter-arytenoid edema as well as arytenoid edema.    Hypopharynx:  The hypopharynx was found to have no mass or lesion.    The patient tolerated procedure well.

## 2023-10-24 NOTE — PATIENT INSTRUCTIONS
Increase omeprazole to twice daily  Reflux precautions  Recommend proceeding with GI recommendation of esophageal stretching  Return for problems    Gastroesophageal Reflux Disease (Laryngopharyngeal Reflux), Adult  Gastroesophageal reflux disease (GERD) and/or Laryngopharyngeal Reflux, (LPR) happens when acid from your stomach flows up into the esophagus and/or throat and voicebox or larynx. When acid comes in contact with the these organs, the acid can cause soreness (inflammation). Over time, GERD may create small holes (ulcers) in the lining of the esophagus and may lead to the development of hoarseness, difficulty swallowing,   feeling of something stuck in the throat, increased mucous or drainage and even predispose to the development of malignancies, (cancer).    CAUSES   Increased body weight. This puts pressure on the stomach, making acid rise from the stomach into the esophagus.  Smoking. This increases acid production in the stomach.  Drinking alcohol. This causes decreased pressure in the lower esophageal sphincter (valve or ring of muscle between the esophagus and stomach), allowing acid from the stomach into the esophagus.  Late evening meals and a full stomach. This increases pressure and acid production in the stomach.  A malformed lower esophageal sphincter  Diet which can include avoidance of gluten and dairy products  Age  SYMPTOMS   Burning pain in the lower part of the mid-chest behind the breastbone and in the mid-stomach area. This may occur twice a week or more often.  Trouble swallowing.  Sore throat.  Dry cough.  Asthma-like symptoms including chest tightness, shortness of breath, or wheezing.  Globus sensation-something stuck in the throat/fullness  Hoarseness  DIAGNOSIS   Your caregiver may be able to diagnose GERD based on your symptoms. In some cases, X-rays and other tests may be done to check for complications or to check the condition of your stomach and esophagus.  You may need to  see another doctor.  TREATMENT   Over-the-counter or prescription medicines to help decrease acid production.   Dietary and behavioral modifications or changes may be also recommended.  HOME CARE INSTRUCTIONS   Change the factors that you can control. Ask your caregiver for guidance concerning weight loss, quitting smoking, and alcohol consumption.  Avoid foods and drinks that make your symptoms worse, and MAY include such as:  Caffeine or alcoholic drinks.  Chocolate.  Gluten containing foods  Dairy  Peppermint or mint flavorings.  Garlic and onions.  Spicy foods.  Citrus fruits, such as oranges, elmer, or limes.  Tomato-based foods such as sauce, chili, salsa, and pizza.  Fried and fatty foods.  Avoid lying down for the 3 hours prior to your bedtime or prior to taking a nap.  Eat small, frequent meals instead of large meals.  Wear loose-fitting clothing. Do not wear anything tight around your waist that causes pressure on your stomach.  Raise the head of your bed 6 to 8 inches with wood blocks to help you sleep. Extra pillows will not help.  Only take over-the-counter or prescription medicines for pain, discomfort, or fever as directed by your caregiver.  Do not take aspirin, ibuprofen, or other nonsteroidal anti-inflammatory drugs if possible (NSAIDs).  SEEK IMMEDIATE MEDICAL CARE IF:   You have pain in your arms, neck, jaw, teeth, or back.  Your pain increases or changes in intensity or duration.  You develop nausea, vomiting, or sweating (diaphoresis).  You develop shortness of breath, or you faint.  Your vomit is green, yellow, black, or looks like coffee grounds or blood.  Your stool is red, bloody, or black.  These symptoms could be signs of other problems, such as heart disease, gastric bleeding, or esophageal bleeding.  MAKE SURE YOU:   Understand these instructions.  Will watch your condition.  Will get help right away if you are not doing well or get worse.     This information is not intended to  replace advice given to you by your physician. Make sure you discuss any questions you have with your health care provider.     Modified by Yunier Dodson MD, FACS 9/8/2016.  Document Released: 09/27/2006 Document Revised: 01/08/2016 Document Reviewed: 04/13/2016  [employer] Interactive Patient Education ©2016 [employer]

## 2023-10-25 ENCOUNTER — TELEPHONE (OUTPATIENT)
Dept: GASTROENTEROLOGY | Facility: CLINIC | Age: 63
End: 2023-10-25
Payer: MEDICARE

## 2023-10-25 NOTE — TELEPHONE ENCOUNTER
PT has been to see Dr. Dodson.   She said her throat was swelling after she eats. ? Yeast infection.   Dr. Clark office said PT needed to contact our office.  She had an endo 8-28-23.

## 2023-11-08 ENCOUNTER — OFFICE VISIT (OUTPATIENT)
Dept: GASTROENTEROLOGY | Facility: CLINIC | Age: 63
End: 2023-11-08
Payer: MEDICARE

## 2023-11-08 VITALS
WEIGHT: 160 LBS | HEART RATE: 82 BPM | OXYGEN SATURATION: 97 % | DIASTOLIC BLOOD PRESSURE: 66 MMHG | SYSTOLIC BLOOD PRESSURE: 114 MMHG | BODY MASS INDEX: 28.35 KG/M2 | TEMPERATURE: 98 F | HEIGHT: 63 IN

## 2023-11-08 DIAGNOSIS — R13.14 PHARYNGOESOPHAGEAL DYSPHAGIA: Primary | ICD-10-CM

## 2023-11-08 NOTE — PROGRESS NOTES
[facility] GASTROENTEROLOGY - OFFICE NOTE    2023    Mary Medley   1960    Primary Physician: Jose Ramon Madrigal MD    Chief Complaint   Patient presents with    Difficulty Swallowing         HISTORY OF PRESENT ILLNESS:     Mary Medley is a 63 y.o. female presents  with dysphagia.       Continues with dysphagia. Has pain on either side of the neck and tender to palpate.Taking omeprazole twice daily.  No heartburn or regurgitation.    She has MS. She is on treatment. She sees Dr. Jade in [place].     UPPER GI ENDOSCOPY (2023 12:03) esophageal dilation did not help dysphagia.           ======================================================================  Office visit 8-3-23 HPI  Mary Medley is a 63 y.o. female presents  with dysphagia that started around 3 mo ago.  Note with solid foods.  She points to the neck area where this occurs.  Taking a drink helps.  Started prilosec 3 weeks. Was only having occasional heartburn. No weight loss. No abdominal pain. No fever.            Has had a cough , non productive. Clearing throat helps.            UPPER GI ENDOSCOPY (2020 10:26)        Past Medical History:   Diagnosis Date    Arthritis     Claustrophobia     Disease of thyroid gland     Glaucoma     Glaucoma     History of transfusion     Hypertension     MS (multiple sclerosis)        Past Surgical History:   Procedure Laterality Date    APPENDECTOMY       SECTION      CHOLECYSTECTOMY      COLONOSCOPY N/A 2020    Procedure: COLONOSCOPY WITH ANESTHESIA;  Surgeon: Eduard Taylor MD;  Location: [facility] ENDOSCOPY;  Service: Gastroenterology;  Laterality: N/A;  pre: screen  post: normal   Jose Ramon Agarwal MD    ELBOW ARTHROSCOPY Left 2019    ENDOSCOPY N/A 2020    Procedure: ESOPHAGOGASTRODUODENOSCOPY WITH ANESTHESIA;  Surgeon: Eduard Taylor MD;  Location: [facility] ENDOSCOPY;  Service: Gastroenterology;  Laterality: N/A;  pre:  Pharyngoesophageal dysphagia  post: esophagitis   Jose Ramon Agarwal MD    ENDOSCOPY N/A 8/28/2023    Procedure: ESOPHAGOGASTRODUODENOSCOPY WITH ANESTHESIA;  Surgeon: Eduard Taylor MD;  Location: [facility] ENDOSCOPY;  Service: Gastroenterology;  Laterality: N/A;  pre Pharyngoesophageal dysphagia  post: esophagus dilated  Dr.O Morse    EXTRACORPOREAL SHOCK WAVE LITHOTRIPSY (ESWL) Right 10/19/2022    Procedure: EXTRACORPOREAL SHOCKWAVE LITHOTRIPSY-right;  Surgeon: Leonardo Luna MD;  Location: [facility] OR;  Service: Urology;  Laterality: Right;    HAND RECONSTRUCTION Right     HYSTERECTOMY      JOINT REPLACEMENT Left     OOPHORECTOMY      REPLACEMENT TOTAL KNEE Left     TONSILLECTOMY         Outpatient Medications Marked as Taking for the 11/8/23 encounter (Office Visit) with Pearl Dodson APRN   Medication Sig Dispense Refill    acetaminophen (TYLENOL) 325 MG tablet Take 300 mg by mouth 3 (Three) Times a Day.      amLODIPine (NORVASC) 5 MG tablet Take 1 tablet by mouth Daily.      baclofen (LIORESAL) 10 MG tablet TAKE 1/2 TO 1 (ONE-HALF TO ONE) TABLET BY MOUTH THREE TIMES DAILY AS NEEDED      brimonidine (ALPHAGAN) 0.2 % ophthalmic solution Administer 1 drop into the left eye 2 (Two) Times a Day.      diazePAM (VALIUM) 2 MG tablet Take 1 tablet by mouth As Needed.      diclofenac (VOLTAREN) 75 MG EC tablet Take 1 tablet by mouth Daily.      Dimethyl Fumarate (TECFIDERA) capsule delayed-release Take 1 capsule by mouth.      gabapentin (NEURONTIN) 300 MG capsule Take 1 capsule by mouth 3 (Three) Times a Day.      gabapentin (NEURONTIN) 600 MG tablet Take 1 tablet by mouth 3 (Three) Times a Day.      HYDROcodone-acetaminophen (NORCO) 5-325 MG per tablet Take 1 tablet by mouth 3 (Three) Times a Day.      levothyroxine (SYNTHROID, LEVOTHROID) 125 MCG tablet Take 1 tablet by mouth Daily.      olmesartan (BENICAR) 40 MG tablet Take 1 tablet by mouth Daily.      Omeprazole 20 MG tablet delayed-release Take 20 mg by mouth  2 (Two) Times a Day for 120 days. 60 each 3    OXcarbazepine (TRILEPTAL) 600 MG tablet Take 1 tablet by mouth 2 (Two) Times a Day.      traZODone (DESYREL) 150 MG tablet Take 2 tablets by mouth Every Night.         Allergies   Allergen Reactions    Reglan [Metoclopramide] Anxiety    Cephalosporins Rash and Other (See Comments)     Per [facility] pt is allergic    Ciprofloxacin Rash    Erythromycin Rash    Macrolides And Ketolides Rash and Other (See Comments)     Per [facility] pt has allergy    Morphine Irritability     Patient states she gets mean.    Penicillins Rash    Quinolones Other (See Comments)     Per [facility] pt is allergic    Sulfa Antibiotics Rash    Thorazine [Chlorpromazine] Anxiety    Zofran [Ondansetron Hcl] Itching       Social History     Socioeconomic History    Marital status:    Tobacco Use    Smoking status: Former     Years: 3     Types: Cigarettes     Quit date:      Years since quittin.8    Smokeless tobacco: Never   Vaping Use    Vaping Use: Never used   Substance and Sexual Activity    Alcohol use: Yes     Comment: occ    Drug use: No    Sexual activity: Defer       Family History   Problem Relation Age of Onset    Breast cancer Maternal Aunt     Heart failure Mother     Heart disease Father     Heart attack Father     No Known Problems Sister     No Known Problems Brother     No Known Problems Daughter     No Known Problems Son     No Known Problems Maternal Grandmother     No Known Problems Paternal Grandmother     No Known Problems Paternal Aunt     No Known Problems Other     BRCA 1/2 Neg Hx     Colon cancer Neg Hx     Endometrial cancer Neg Hx     Ovarian cancer Neg Hx     Colon polyps Neg Hx        Review of Systems   Constitutional:  Negative for chills, fever and unexpected weight change.   Gastrointestinal:  Negative for nausea and vomiting.        Vitals:    23 1225   BP: 114/66   Pulse: 82   Temp: 98 °F (36.7 °C)   SpO2: 97%   Weight: 72.6  "kg (160 lb)   Height: 160 cm (63\")      Body mass index is 28.34 kg/m².    Physical Exam  Vitals reviewed.   Constitutional:       General: She is not in acute distress.  Cardiovascular:      Rate and Rhythm: Normal rate and regular rhythm.      Heart sounds: Normal heart sounds.   Pulmonary:      Effort: Pulmonary effort is normal.      Breath sounds: Normal breath sounds.   Abdominal:      General: Bowel sounds are normal. There is no distension.      Palpations: Abdomen is soft.      Tenderness: There is no abdominal tenderness.   Skin:     General: Skin is warm and dry.   Neurological:      Mental Status: She is alert.                 ASSESSMENT AND PLAN    Assessment & Plan     Diagnoses and all orders for this visit:    1. Pharyngoesophageal dysphagia (Primary)         She  continues to have dysphagia.  Had recent egd with esophageal dilation that did not help. No heartburn or regurgitation. Takes ppi twice daily.  She reports that some doctors think that the dysphagia could be secondary to MS.  I recommend she f/u with her MS doctor and will see him next month. I question if the issue swallowing is due to MS. I also recommend she continue to f/u with ENT. F/U here prn.                          No follow-ups on file.          There are no Patient Instructions on file for this visit.      SURESH Lezama    EMR Dragon/transcription disclaimer:  Much of this encounter note is electronic transcription/translation of spoken language to printed text.  The electronic translation of spoken language may be erroneous, or at times, nonsensical words or phrases may be inadvertently transcribed.  Although I have reviewed the note for such errors, some may still exist.      "

## 2024-03-05 ENCOUNTER — TRANSCRIBE ORDERS (OUTPATIENT)
Dept: ADMINISTRATIVE | Facility: HOSPITAL | Age: 64
End: 2024-03-05
Payer: MEDICARE

## 2024-03-05 DIAGNOSIS — M51.36 OTHER INTERVERTEBRAL DISC DEGENERATION, LUMBAR REGION: Primary | ICD-10-CM

## 2024-03-06 ENCOUNTER — TRANSCRIBE ORDERS (OUTPATIENT)
Dept: ADMINISTRATIVE | Facility: HOSPITAL | Age: 64
End: 2024-03-06
Payer: MEDICARE

## 2024-03-06 DIAGNOSIS — M51.37 OTHER INTERVERTEBRAL DISC DEGENERATION, LUMBOSACRAL REGION: ICD-10-CM

## 2024-03-06 DIAGNOSIS — M47.817 SPONDYLOSIS OF LUMBOSACRAL REGION WITHOUT MYELOPATHY OR RADICULOPATHY: ICD-10-CM

## 2024-03-06 DIAGNOSIS — M51.36 OTHER INTERVERTEBRAL DISC DEGENERATION, LUMBAR REGION: Primary | ICD-10-CM

## 2024-03-06 DIAGNOSIS — M47.816 SPONDYLOSIS WITHOUT MYELOPATHY OR RADICULOPATHY, LUMBAR REGION: ICD-10-CM

## 2024-04-17 ENCOUNTER — TRANSCRIBE ORDERS (OUTPATIENT)
Dept: ADMINISTRATIVE | Facility: HOSPITAL | Age: 64
End: 2024-04-17
Payer: MEDICARE

## 2024-04-17 ENCOUNTER — LAB (OUTPATIENT)
Dept: LAB | Facility: HOSPITAL | Age: 64
End: 2024-04-17
Payer: MEDICARE

## 2024-04-17 DIAGNOSIS — Z13.6 HYPERTENSION SCREEN: ICD-10-CM

## 2024-04-17 DIAGNOSIS — Z13.6 HYPERTENSION SCREEN: Primary | ICD-10-CM

## 2024-04-17 LAB
ANION GAP SERPL CALCULATED.3IONS-SCNC: 9 MMOL/L (ref 4–13)
BUN SERPL-MCNC: 27 MG/DL (ref 5–21)
BUN/CREAT SERPL: 37.5
CALCIUM SPEC-SCNC: 9.9 MG/DL (ref 8.4–10.4)
CHLORIDE SERPL-SCNC: 98 MMOL/L (ref 98–110)
CO2 SERPL-SCNC: 28 MMOL/L (ref 24–31)
CREAT SERPL-MCNC: 0.72 MG/DL (ref 0.5–1.4)
EGFRCR SERPLBLD CKD-EPI 2021: 94.7 ML/MIN/1.73
GLUCOSE SERPL-MCNC: 92 MG/DL (ref 70–100)
POTASSIUM SERPL-SCNC: 4.4 MMOL/L (ref 3.5–5.3)
SODIUM SERPL-SCNC: 135 MMOL/L (ref 135–145)

## 2024-04-17 PROCEDURE — 36415 COLL VENOUS BLD VENIPUNCTURE: CPT

## 2024-04-17 PROCEDURE — 80048 BASIC METABOLIC PNL TOTAL CA: CPT

## 2024-05-19 PROCEDURE — 87484 EHRLICHA CHAFFEENSIS AMP PRB: CPT | Performed by: NURSE PRACTITIONER

## 2024-05-19 PROCEDURE — 87798 DETECT AGENT NOS DNA AMP: CPT | Performed by: NURSE PRACTITIONER

## 2024-05-19 PROCEDURE — 86618 LYME DISEASE ANTIBODY: CPT | Performed by: NURSE PRACTITIONER

## 2024-05-19 PROCEDURE — 87468 ANAPLSMA PHGCYTOPHLM AMP PRB: CPT | Performed by: NURSE PRACTITIONER

## 2024-07-17 PROCEDURE — 87147 CULTURE TYPE IMMUNOLOGIC: CPT | Performed by: NURSE PRACTITIONER

## 2024-07-17 PROCEDURE — 87070 CULTURE OTHR SPECIMN AEROBIC: CPT | Performed by: NURSE PRACTITIONER

## 2024-07-17 PROCEDURE — 87186 SC STD MICRODIL/AGAR DIL: CPT | Performed by: NURSE PRACTITIONER

## 2024-07-17 PROCEDURE — 87077 CULTURE AEROBIC IDENTIFY: CPT | Performed by: NURSE PRACTITIONER

## 2024-07-17 PROCEDURE — 87205 SMEAR GRAM STAIN: CPT | Performed by: NURSE PRACTITIONER

## 2024-07-19 ENCOUNTER — TELEPHONE (OUTPATIENT)
Dept: URGENT CARE | Facility: CLINIC | Age: 64
End: 2024-07-19
Payer: MEDICARE

## 2024-10-11 ENCOUNTER — APPOINTMENT (OUTPATIENT)
Dept: GENERAL RADIOLOGY | Facility: HOSPITAL | Age: 64
End: 2024-10-11
Payer: MEDICARE

## 2024-10-11 PROCEDURE — 74018 RADEX ABDOMEN 1 VIEW: CPT

## 2024-10-24 ENCOUNTER — APPOINTMENT (OUTPATIENT)
Dept: GENERAL RADIOLOGY | Facility: HOSPITAL | Age: 64
End: 2024-10-24
Payer: MEDICARE

## 2024-10-24 PROCEDURE — 73502 X-RAY EXAM HIP UNI 2-3 VIEWS: CPT

## 2024-10-24 PROCEDURE — 73552 X-RAY EXAM OF FEMUR 2/>: CPT

## 2024-11-21 ENCOUNTER — TRANSCRIBE ORDERS (OUTPATIENT)
Dept: ADMINISTRATIVE | Facility: HOSPITAL | Age: 64
End: 2024-11-21
Payer: MEDICARE

## 2024-11-21 DIAGNOSIS — Z12.31 OTHER SCREENING MAMMOGRAM: Primary | ICD-10-CM

## 2024-11-22 ENCOUNTER — TRANSCRIBE ORDERS (OUTPATIENT)
Dept: ADMINISTRATIVE | Facility: HOSPITAL | Age: 64
End: 2024-11-22
Payer: MEDICARE

## 2024-11-22 ENCOUNTER — LAB (OUTPATIENT)
Dept: LAB | Facility: HOSPITAL | Age: 64
End: 2024-11-22
Payer: MEDICARE

## 2024-11-22 DIAGNOSIS — E03.9 HYPOTHYROIDISM, UNSPECIFIED TYPE: ICD-10-CM

## 2024-11-22 DIAGNOSIS — R51.9 INTRACTABLE HEADACHE, UNSPECIFIED CHRONICITY PATTERN, UNSPECIFIED HEADACHE TYPE: ICD-10-CM

## 2024-11-22 DIAGNOSIS — E78.00 HYPERCHOLESTEROLEMIA: ICD-10-CM

## 2024-11-22 DIAGNOSIS — I10 ESSENTIAL (PRIMARY) HYPERTENSION: Primary | ICD-10-CM

## 2024-11-22 DIAGNOSIS — I10 ESSENTIAL (PRIMARY) HYPERTENSION: ICD-10-CM

## 2024-11-22 LAB
ALBUMIN SERPL-MCNC: 4.8 G/DL (ref 3.5–5)
ALBUMIN/GLOB SERPL: 1.6 G/DL (ref 1.1–2.5)
ALP SERPL-CCNC: 116 U/L (ref 24–120)
ALT SERPL W P-5'-P-CCNC: 23 U/L (ref 0–35)
ANION GAP SERPL CALCULATED.3IONS-SCNC: 13 MMOL/L (ref 4–13)
AST SERPL-CCNC: 32 U/L (ref 7–45)
AUTO MIXED CELLS #: 0.8 10*3/MM3 (ref 0.1–2.6)
AUTO MIXED CELLS %: 11.7 % (ref 0.1–24)
BILIRUB SERPL-MCNC: <0.1 MG/DL (ref 0.1–1)
BUN SERPL-MCNC: 23 MG/DL (ref 5–21)
BUN/CREAT SERPL: 32.9
CALCIUM SPEC-SCNC: 9.9 MG/DL (ref 8.6–10.5)
CHLORIDE SERPL-SCNC: 99 MMOL/L (ref 98–110)
CHOLEST SERPL-MCNC: 251 MG/DL (ref 130–200)
CO2 SERPL-SCNC: 29 MMOL/L (ref 24–31)
CREAT SERPL-MCNC: 0.7 MG/DL (ref 0.5–1.4)
EGFRCR SERPLBLD CKD-EPI 2021: 97.9 ML/MIN/1.73
ERYTHROCYTE [DISTWIDTH] IN BLOOD BY AUTOMATED COUNT: 13.4 % (ref 12.3–15.4)
GLOBULIN UR ELPH-MCNC: 3 GM/DL
GLUCOSE SERPL-MCNC: 126 MG/DL (ref 65–99)
HCT VFR BLD AUTO: 38.9 % (ref 34–46.6)
HDLC SERPL-MCNC: 79 MG/DL
HGB BLD-MCNC: 12.5 G/DL (ref 12–15.9)
LDLC SERPL CALC-MCNC: 142 MG/DL (ref 0–99)
LDLC/HDLC SERPL: 1.74 {RATIO}
LYMPHOCYTES # BLD AUTO: 1.7 10*3/MM3 (ref 0.7–3.1)
LYMPHOCYTES NFR BLD AUTO: 25.7 % (ref 19.6–45.3)
MCH RBC QN AUTO: 31.1 PG (ref 26.6–33)
MCHC RBC AUTO-ENTMCNC: 32.1 G/DL (ref 31.5–35.7)
MCV RBC AUTO: 96.8 FL (ref 79–97)
NEUTROPHILS NFR BLD AUTO: 4.3 10*3/MM3 (ref 1.7–7)
NEUTROPHILS NFR BLD AUTO: 62.6 % (ref 42.7–76)
PLATELET # BLD AUTO: 323 10*3/MM3 (ref 140–450)
PMV BLD AUTO: 8.2 FL (ref 6–12)
POTASSIUM SERPL-SCNC: 4.2 MMOL/L (ref 3.5–5.3)
PROT SERPL-MCNC: 7.8 G/DL (ref 6.3–8.7)
RBC # BLD AUTO: 4.02 10*6/MM3 (ref 3.77–5.28)
SODIUM SERPL-SCNC: 141 MMOL/L (ref 135–145)
TRIGL SERPL-MCNC: 171 MG/DL (ref 0–149)
TSH SERPL DL<=0.05 MIU/L-ACNC: 0.84 UIU/ML (ref 0.27–4.2)
VLDLC SERPL-MCNC: 30 MG/DL (ref 5–40)
WBC NRBC COR # BLD AUTO: 6.8 10*3/MM3 (ref 3.4–10.8)

## 2024-11-22 PROCEDURE — 85025 COMPLETE CBC W/AUTO DIFF WBC: CPT

## 2024-11-22 PROCEDURE — 36415 COLL VENOUS BLD VENIPUNCTURE: CPT

## 2024-11-22 PROCEDURE — 80061 LIPID PANEL: CPT

## 2024-11-22 PROCEDURE — 80053 COMPREHEN METABOLIC PANEL: CPT

## 2024-11-22 PROCEDURE — 84443 ASSAY THYROID STIM HORMONE: CPT

## 2024-12-03 LAB
NCCN CRITERIA FLAG: ABNORMAL
TYRER CUZICK SCORE: 5.5

## 2024-12-11 ENCOUNTER — DOCUMENTATION (OUTPATIENT)
Dept: GENETICS | Facility: HOSPITAL | Age: 64
End: 2024-12-11
Payer: MEDICARE

## 2025-01-22 ENCOUNTER — TRANSCRIBE ORDERS (OUTPATIENT)
Dept: ADMINISTRATIVE | Facility: HOSPITAL | Age: 65
End: 2025-01-22
Payer: MEDICARE

## 2025-01-22 DIAGNOSIS — G35 MULTIPLE SCLEROSIS: ICD-10-CM

## 2025-01-22 DIAGNOSIS — G95.9 DISEASE OF SPINAL CORD: Primary | ICD-10-CM

## 2025-01-22 DIAGNOSIS — F40.240 CLAUSTROPHOBIA: ICD-10-CM

## 2025-04-03 ENCOUNTER — ANESTHESIA EVENT (OUTPATIENT)
Dept: MRI IMAGING | Facility: HOSPITAL | Age: 65
End: 2025-04-03
Payer: MEDICARE

## 2025-04-03 ENCOUNTER — HOSPITAL ENCOUNTER (OUTPATIENT)
Dept: MRI IMAGING | Facility: HOSPITAL | Age: 65
Discharge: HOME | End: 2025-04-03
Payer: MEDICARE

## 2025-04-03 ENCOUNTER — ANESTHESIA (OUTPATIENT)
Dept: MRI IMAGING | Facility: HOSPITAL | Age: 65
End: 2025-04-03
Payer: MEDICARE

## 2025-04-03 VITALS
RESPIRATION RATE: 16 BRPM | WEIGHT: 160.05 LBS | SYSTOLIC BLOOD PRESSURE: 137 MMHG | TEMPERATURE: 97 F | HEART RATE: 70 BPM | BODY MASS INDEX: 27.33 KG/M2 | DIASTOLIC BLOOD PRESSURE: 67 MMHG | HEIGHT: 64 IN | OXYGEN SATURATION: 94 %

## 2025-04-03 DIAGNOSIS — G35 MULTIPLE SCLEROSIS: ICD-10-CM

## 2025-04-03 DIAGNOSIS — G95.9 DISEASE OF SPINAL CORD: ICD-10-CM

## 2025-04-03 DIAGNOSIS — F40.240 CLAUSTROPHOBIA: ICD-10-CM

## 2025-04-03 PROCEDURE — 25010000002 DEXAMETHASONE PER 1 MG: Performed by: ANESTHESIOLOGY

## 2025-04-03 PROCEDURE — 25010000002 FAMOTIDINE 10 MG/ML SOLUTION: Performed by: ANESTHESIOLOGY

## 2025-04-03 PROCEDURE — A9579 GAD-BASE MR CONTRAST NOS,1ML: HCPCS | Performed by: PSYCHIATRY & NEUROLOGY

## 2025-04-03 PROCEDURE — 25010000002 PROPOFOL 10 MG/ML EMULSION: Performed by: NURSE ANESTHETIST, CERTIFIED REGISTERED

## 2025-04-03 PROCEDURE — 72156 MRI NECK SPINE W/O & W/DYE: CPT

## 2025-04-03 PROCEDURE — 25510000001 GADOPICLENOL 0.5 MMOL/ML SOLUTION: Performed by: PSYCHIATRY & NEUROLOGY

## 2025-04-03 PROCEDURE — 25010000002 MIDAZOLAM PER 1MG: Performed by: ANESTHESIOLOGY

## 2025-04-03 PROCEDURE — 25810000003 LACTATED RINGERS PER 1000 ML: Performed by: ANESTHESIOLOGY

## 2025-04-03 RX ORDER — HYDROCODONE BITARTRATE AND ACETAMINOPHEN 10; 325 MG/1; MG/1
1 TABLET ORAL EVERY 4 HOURS PRN
Status: DISCONTINUED | OUTPATIENT
Start: 2025-04-03 | End: 2025-04-04 | Stop reason: HOSPADM

## 2025-04-03 RX ORDER — PROPOFOL 10 MG/ML
VIAL (ML) INTRAVENOUS AS NEEDED
Status: DISCONTINUED | OUTPATIENT
Start: 2025-04-03 | End: 2025-04-03 | Stop reason: SURG

## 2025-04-03 RX ORDER — MELOXICAM 15 MG/1
15 TABLET ORAL DAILY
COMMUNITY

## 2025-04-03 RX ORDER — SODIUM CHLORIDE 0.9 % (FLUSH) 0.9 %
3 SYRINGE (ML) INJECTION EVERY 12 HOURS SCHEDULED
Status: DISCONTINUED | OUTPATIENT
Start: 2025-04-03 | End: 2025-04-04 | Stop reason: HOSPADM

## 2025-04-03 RX ORDER — IBUPROFEN 600 MG/1
600 TABLET, FILM COATED ORAL EVERY 6 HOURS PRN
Status: DISCONTINUED | OUTPATIENT
Start: 2025-04-03 | End: 2025-04-04 | Stop reason: HOSPADM

## 2025-04-03 RX ORDER — MIDAZOLAM HYDROCHLORIDE 2 MG/2ML
1 INJECTION, SOLUTION INTRAMUSCULAR; INTRAVENOUS
Status: DISCONTINUED | OUTPATIENT
Start: 2025-04-03 | End: 2025-04-04 | Stop reason: HOSPADM

## 2025-04-03 RX ORDER — SODIUM CHLORIDE, SODIUM LACTATE, POTASSIUM CHLORIDE, CALCIUM CHLORIDE 600; 310; 30; 20 MG/100ML; MG/100ML; MG/100ML; MG/100ML
1000 INJECTION, SOLUTION INTRAVENOUS CONTINUOUS
Status: DISCONTINUED | OUTPATIENT
Start: 2025-04-03 | End: 2025-04-04 | Stop reason: HOSPADM

## 2025-04-03 RX ORDER — FAMOTIDINE 10 MG/ML
20 INJECTION, SOLUTION INTRAVENOUS
Status: COMPLETED | OUTPATIENT
Start: 2025-04-03 | End: 2025-04-03

## 2025-04-03 RX ORDER — ASPIRIN 81 MG/1
81 TABLET ORAL DAILY
COMMUNITY

## 2025-04-03 RX ORDER — SODIUM CHLORIDE, SODIUM LACTATE, POTASSIUM CHLORIDE, CALCIUM CHLORIDE 600; 310; 30; 20 MG/100ML; MG/100ML; MG/100ML; MG/100ML
100 INJECTION, SOLUTION INTRAVENOUS CONTINUOUS
Status: DISCONTINUED | OUTPATIENT
Start: 2025-04-03 | End: 2025-04-04 | Stop reason: HOSPADM

## 2025-04-03 RX ORDER — LABETALOL HYDROCHLORIDE 5 MG/ML
5 INJECTION, SOLUTION INTRAVENOUS
Status: DISCONTINUED | OUTPATIENT
Start: 2025-04-03 | End: 2025-04-04 | Stop reason: HOSPADM

## 2025-04-03 RX ORDER — LIDOCAINE HYDROCHLORIDE 10 MG/ML
0.5 INJECTION, SOLUTION EPIDURAL; INFILTRATION; INTRACAUDAL; PERINEURAL ONCE AS NEEDED
Status: DISCONTINUED | OUTPATIENT
Start: 2025-04-03 | End: 2025-04-04 | Stop reason: HOSPADM

## 2025-04-03 RX ORDER — FLUMAZENIL 0.1 MG/ML
0.2 INJECTION INTRAVENOUS AS NEEDED
Status: DISCONTINUED | OUTPATIENT
Start: 2025-04-03 | End: 2025-04-04 | Stop reason: HOSPADM

## 2025-04-03 RX ORDER — SODIUM CHLORIDE 0.9 % (FLUSH) 0.9 %
3 SYRINGE (ML) INJECTION AS NEEDED
Status: DISCONTINUED | OUTPATIENT
Start: 2025-04-03 | End: 2025-04-04 | Stop reason: HOSPADM

## 2025-04-03 RX ORDER — ACETAMINOPHEN 500 MG
1000 TABLET ORAL ONCE
Status: COMPLETED | OUTPATIENT
Start: 2025-04-03 | End: 2025-04-03

## 2025-04-03 RX ORDER — HYDROCODONE BITARTRATE AND ACETAMINOPHEN 5; 325 MG/1; MG/1
1 TABLET ORAL EVERY 4 HOURS PRN
Status: DISCONTINUED | OUTPATIENT
Start: 2025-04-03 | End: 2025-04-04 | Stop reason: HOSPADM

## 2025-04-03 RX ORDER — DEXAMETHASONE SODIUM PHOSPHATE 4 MG/ML
4 INJECTION, SOLUTION INTRA-ARTICULAR; INTRALESIONAL; INTRAMUSCULAR; INTRAVENOUS; SOFT TISSUE ONCE AS NEEDED
Status: COMPLETED | OUTPATIENT
Start: 2025-04-03 | End: 2025-04-03

## 2025-04-03 RX ORDER — SODIUM CHLORIDE 0.9 % (FLUSH) 0.9 %
3-10 SYRINGE (ML) INJECTION AS NEEDED
Status: DISCONTINUED | OUTPATIENT
Start: 2025-04-03 | End: 2025-04-04 | Stop reason: HOSPADM

## 2025-04-03 RX ORDER — NALOXONE HCL 0.4 MG/ML
0.4 VIAL (ML) INJECTION AS NEEDED
Status: DISCONTINUED | OUTPATIENT
Start: 2025-04-03 | End: 2025-04-04 | Stop reason: HOSPADM

## 2025-04-03 RX ORDER — SODIUM CHLORIDE 9 MG/ML
40 INJECTION, SOLUTION INTRAVENOUS AS NEEDED
Status: DISCONTINUED | OUTPATIENT
Start: 2025-04-03 | End: 2025-04-04 | Stop reason: HOSPADM

## 2025-04-03 RX ORDER — FENTANYL CITRATE 50 UG/ML
50 INJECTION, SOLUTION INTRAMUSCULAR; INTRAVENOUS
Status: DISCONTINUED | OUTPATIENT
Start: 2025-04-03 | End: 2025-04-04 | Stop reason: HOSPADM

## 2025-04-03 RX ADMIN — DEXAMETHASONE SODIUM PHOSPHATE 4 MG: 4 INJECTION, SOLUTION INTRA-ARTICULAR; INTRALESIONAL; INTRAMUSCULAR; INTRAVENOUS; SOFT TISSUE at 11:02

## 2025-04-03 RX ADMIN — FAMOTIDINE 20 MG: 10 INJECTION INTRAVENOUS at 11:02

## 2025-04-03 RX ADMIN — GADOPICLENOL 7 ML: 485.1 INJECTION INTRAVENOUS at 12:16

## 2025-04-03 RX ADMIN — PROPOFOL 200 MG: 10 INJECTION, EMULSION INTRAVENOUS at 11:40

## 2025-04-03 RX ADMIN — SODIUM CHLORIDE, SODIUM LACTATE, POTASSIUM CHLORIDE, CALCIUM CHLORIDE 100 ML/HR: 20; 30; 600; 310 INJECTION, SOLUTION INTRAVENOUS at 11:00

## 2025-04-03 RX ADMIN — MIDAZOLAM HYDROCHLORIDE 1 MG: 1 INJECTION, SOLUTION INTRAMUSCULAR; INTRAVENOUS at 11:31

## 2025-04-03 RX ADMIN — ACETAMINOPHEN TAB 500 MG 1000 MG: 500 TAB at 11:02

## 2025-04-03 NOTE — ANESTHESIA PROCEDURE NOTES
Airway  Reason: elective    Date/Time: 4/3/2025 11:40 AM  Airway not difficult    General Information and Staff    Patient location during procedure: OR  CRNA/CAA: Yves Guerra CRNA    Indications and Patient Condition    Preoxygenated: yes    Mask difficulty assessment: 0 - not attempted    Final Airway Details    Final airway type: supraglottic airway      Successful airway: classic  Size: 3   Number of attempts at approach: 1  Assessment: lips, teeth, and gum same as pre-op

## 2025-04-03 NOTE — ANESTHESIA POSTPROCEDURE EVALUATION
"Patient: Mary Medley    Procedure Summary       Date: 04/03/25 Room / Location: [facility] MRI    Anesthesia Start: 1131 Anesthesia Stop: 1237    Procedure: MRI CERVICAL SPINE W WO CONTRAST Diagnosis:       Disease of spinal cord      Claustrophobia      Multiple sclerosis      (G35, F40.240, G95.9)    Scheduled Providers:  Provider: Yves Guerra CRNA    Anesthesia Type: general ASA Status: 2            Anesthesia Type: general    Vitals  Vitals Value Taken Time   /65 04/03/25 13:31   Temp 97 °F (36.1 °C) 04/03/25 12:32   Pulse 69 04/03/25 13:39   Resp 16 04/03/25 13:30   SpO2 95 % 04/03/25 13:39   Vitals shown include unfiled device data.        Post Anesthesia Care and Evaluation    Patient location during evaluation: PHASE II  Patient participation: complete - patient participated  Level of consciousness: awake and alert  Pain management: adequate    Airway patency: patent  Anesthetic complications: No anesthetic complications    Cardiovascular status: acceptable  Respiratory status: acceptable  Hydration status: acceptable    Comments: Blood pressure 132/65, pulse 71, temperature 97 °F (36.1 °C), temperature source Temporal, resp. rate 16, height 162 cm (63.78\"), weight 72.6 kg (160 lb 0.9 oz), SpO2 95%, not currently breastfeeding.    Pt discharged from PACU based on shelia score >8    "

## 2025-04-03 NOTE — ANESTHESIA PREPROCEDURE EVALUATION
Anesthesia Evaluation     history of anesthetic complications:  PONV  NPO Solid Status: > 8 hours  NPO Liquid Status: > 8 hours           Airway   Mallampati: I  TM distance: >3 FB  No difficulty expected  Dental      Pulmonary    (+) a smoker Former,  (-) sleep apnea  Cardiovascular   Exercise tolerance: good (4-7 METS)    (+) hypertension  (-) CAD      Neuro/Psych  (-) seizures, TIA, CVA  GI/Hepatic/Renal/Endo    (+) thyroid problem hypothyroidism  (-) liver disease, no renal disease, diabetes    Musculoskeletal         ROS comment: Multiple sclerosis  Abdominal    Substance History      OB/GYN          Other   arthritis,       Other Comment: glaucoma              Anesthesia Plan    ASA 2     general     intravenous induction     Anesthetic plan, risks, benefits, and alternatives have been provided, discussed and informed consent has been obtained with: patient.    CODE STATUS:

## 2025-04-09 NOTE — PROGRESS NOTES
Subjective    Ms. Medley is 65 y.o. female    Chief Complaint: Suprapubic pain and microscopic hematuria    History of Present Illness    65-year-old female established patient presents with new complaint of suprapubic pain/bladder spasms accompanied with increased urine frequency and microscopic hematuria that was found over the last month.  Denies any gross hematuria.  Was seen at urgent care back in February with same complaint.  Was placed on Macrobid and Pyridium.  No urine culture was sent.  Urine at the time only showed blood present.    Patient with a history of kidney stones for which underwent right ESWL treatment due to a 10 mm right midpole renal stone by Dr. Jeremy friend in 10/2022.  No further follow-up since.    History of progressing MS.  Is receiving infusions.    The following portions of the patient's history were reviewed and updated as appropriate: allergies, current medications, past family history, past medical history, past social history, past surgical history and problem list.    Review of Systems   Constitutional:  Negative for chills and fever.   Gastrointestinal:  Negative for abdominal pain, anal bleeding and blood in stool.   Genitourinary:  Positive for dysuria, frequency and urgency. Negative for hematuria.         Current Outpatient Medications:     acetaminophen (TYLENOL) 325 MG tablet, Take 300 mg by mouth As Needed., Disp: , Rfl:     amLODIPine (NORVASC) 2.5 MG tablet, , Disp: , Rfl:     aspirin 81 MG EC tablet, Take 1 tablet by mouth Daily., Disp: , Rfl:     baclofen (LIORESAL) 10 MG tablet, Take 1 tablet by mouth 3 (Three) Times a Day As Needed., Disp: , Rfl:     brimonidine (ALPHAGAN) 0.2 % ophthalmic solution, Administer 1 drop into the left eye 2 (Two) Times a Day., Disp: , Rfl:     Cholecalciferol 50 MCG (2000 UT) tablet, Daily., Disp: , Rfl:     diazePAM (VALIUM) 2 MG tablet, 1 tablet., Disp: , Rfl:     Dimethyl Fumarate (TECFIDERA) capsule delayed-release, Take 1  capsule by mouth 2 (Two) Times a Day., Disp: , Rfl:     gabapentin (NEURONTIN) 300 MG capsule, Take 1 capsule by mouth 3 (Three) Times a Day., Disp: , Rfl:     gabapentin (NEURONTIN) 600 MG tablet, Take 1 tablet by mouth 3 (Three) Times a Day., Disp: , Rfl:     HYDROcodone-acetaminophen (NORCO) 5-325 MG per tablet, Take 1 tablet by mouth 3 (Three) Times a Day., Disp: , Rfl:     levothyroxine (SYNTHROID, LEVOTHROID) 150 MCG tablet, Take 1 tablet by mouth Daily., Disp: , Rfl:     Melatonin 10 MG tablet, Take  by mouth., Disp: , Rfl:     meloxicam (MOBIC) 15 MG tablet, Take 1 tablet by mouth Daily., Disp: , Rfl:     olmesartan (BENICAR) 40 MG tablet, Take 1 tablet by mouth Daily., Disp: , Rfl:     OXcarbazepine (TRILEPTAL) 300 MG tablet, Take 1 tablet by mouth 2 (Two) Times a Day., Disp: , Rfl:     traZODone (DESYREL) 150 MG tablet, Take 2 tablets by mouth Every Night., Disp: , Rfl:     oxybutynin (DITROPAN) 5 MG tablet, Take 1 tablet by mouth 3 (Three) Times a Day As Needed (bladder spasms)., Disp: 30 tablet, Rfl: 1    Past Medical History:   Diagnosis Date    Arthritis     Claustrophobia     Glaucoma     History of transfusion     Hypertension     Hypothyroidism     MS (multiple sclerosis)     PONV (postoperative nausea and vomiting)        Past Surgical History:   Procedure Laterality Date    APPENDECTOMY       SECTION      CHOLECYSTECTOMY      COLONOSCOPY N/A 2020    Procedure: COLONOSCOPY WITH ANESTHESIA;  Surgeon: Eduard Taylor MD;  Location: [facility] ENDOSCOPY;  Service: Gastroenterology;  Laterality: N/A;  pre: screen  post: normal   Jose Ramon Agarwal MD    ELBOW ARTHROSCOPY Left 2019    ENDOSCOPY N/A 2020    Procedure: ESOPHAGOGASTRODUODENOSCOPY WITH ANESTHESIA;  Surgeon: Eduard Taylor MD;  Location: [facility] ENDOSCOPY;  Service: Gastroenterology;  Laterality: N/A;  pre: Pharyngoesophageal dysphagia  post: esophagitis   Jose Ramon Agarwal MD    ENDOSCOPY N/A 2023    Procedure:  "ESOPHAGOGASTRODUODENOSCOPY WITH ANESTHESIA;  Surgeon: Eduard Taylor MD;  Location: [facility] ENDOSCOPY;  Service: Gastroenterology;  Laterality: N/A;  pre Pharyngoesophageal dysphagia  post: esophagus dilated  Dr.O Morse    EXTRACORPOREAL SHOCK WAVE LITHOTRIPSY (ESWL) Right 10/19/2022    Procedure: EXTRACORPOREAL SHOCKWAVE LITHOTRIPSY-right;  Surgeon: Leonardo Luna MD;  Location: [facility] OR;  Service: Urology;  Laterality: Right;    HAND RECONSTRUCTION Right     HYSTERECTOMY      JOINT REPLACEMENT Left     OOPHORECTOMY      REPLACEMENT TOTAL KNEE Left     TONSILLECTOMY      TOTAL SHOULDER REPLACEMENT Right 2024       Social History     Socioeconomic History    Marital status:    Tobacco Use    Smoking status: Former     Current packs/day: 0.00     Types: Cigarettes     Start date:      Quit date:      Years since quittin.3    Smokeless tobacco: Never   Vaping Use    Vaping status: Never Used   Substance and Sexual Activity    Alcohol use: Yes     Comment: occ    Drug use: No    Sexual activity: Defer     Birth control/protection: Hysterectomy       Family History   Problem Relation Age of Onset    Breast cancer Maternal Aunt     Heart failure Mother     Heart disease Father     Heart attack Father     No Known Problems Sister     No Known Problems Brother     No Known Problems Daughter     No Known Problems Son     No Known Problems Maternal Grandmother     No Known Problems Paternal Grandmother     No Known Problems Paternal Aunt     No Known Problems Other     BRCA 1/2 Neg Hx     Colon cancer Neg Hx     Endometrial cancer Neg Hx     Ovarian cancer Neg Hx     Colon polyps Neg Hx        Objective    Temp 97.8 °F (36.6 °C)   Ht 162 cm (63.78\")   Wt 72.6 kg (160 lb)   BMI 27.65 kg/m²     Physical Exam        Results for orders placed or performed in visit on 25   POC Urinalysis Dipstick, Multipro    Collection Time: 25  8:59 AM    Specimen: Urine   Result Value Ref Range "    Color Yellow Yellow, Straw, Dark Yellow, Jocelyne    Clarity, UA Clear Clear    Glucose, UA Negative Negative mg/dL    Bilirubin Negative Negative    Ketones, UA Trace (A) Negative    Specific Gravity  1.025 1.005 - 1.030    Blood, UA Small (A) Negative    pH, Urine 5.0 5.0 - 8.0    Protein, POC Negative Negative mg/dL    Urobilinogen, UA 0.2 E.U./dL Normal, 0.2 E.U./dL    Nitrite, UA Negative Negative    Leukocytes Negative Negative   Estimation of residual urine via abdominal ultrasound  Residual Urine: 0 ml  Indication: frequency  Position: Supine  Examination: Incremental scanning of the suprapubic area using 3 MHz transducer using copious amounts of acoustic gel.   Findings: An anechoic area was demonstrated which represented the bladder, with measurement of residual urine as noted. I inspected this myself. In that the residual urine was stable or insignificant, no treatment will be necessary at this time.    KUB independent review    A KUB is available for me to review today.  The image is inspected for a bowel gas pattern and the general bone structure of the spine and pelvis. The kidneys are then inspected closely.  Renal outline is noted if identifiable. The kidney, collecting system, and anticipated path of the ureter are examined for calcifications including those in the true pelvis.  This film reveals:    On the right there are no calcificaitons seen in the kidney or the expected course of the ureter. .    On the left there are no calcificaitons seen in the kidney or the expected course of the ureter. .    Assessment and Plan    Diagnoses and all orders for this visit:    1. Kidney stones (Primary)  -     POC Urinalysis Dipstick, Multipro  -     XR Abdomen KUB; Future  -     CT Abdomen Pelvis With & Without Contrast; Future    2. Bladder spasms  -     Urine Culture - Urine, Urine, Random Void  -     CT Abdomen Pelvis With & Without Contrast; Future  -     oxybutynin (DITROPAN) 5 MG tablet; Take 1 tablet  by mouth 3 (Three) Times a Day As Needed (bladder spasms).  Dispense: 30 tablet; Refill: 1    3. Urine frequency  -     Urine Culture - Urine, Urine, Random Void    4. Microscopic hematuria  -     CT Abdomen Pelvis With & Without Contrast; Future      Will send urine today for culture to rule out infection as patient's urine again today does show blood present.  Will place orders for CT urogram for hematuria workup as well as patient with previous history of kidney stones.  As based on patient's symptoms could potentially be infection versus stone versus bladder mass.  Will contact patient with results and follow-up plan as no stones were seen on KUB today

## 2025-04-16 ENCOUNTER — TELEPHONE (OUTPATIENT)
Dept: UROLOGY | Facility: CLINIC | Age: 65
End: 2025-04-16
Payer: MEDICARE

## 2025-04-17 ENCOUNTER — OFFICE VISIT (OUTPATIENT)
Dept: UROLOGY | Facility: CLINIC | Age: 65
End: 2025-04-17
Payer: MEDICARE

## 2025-04-17 ENCOUNTER — HOSPITAL ENCOUNTER (OUTPATIENT)
Dept: GENERAL RADIOLOGY | Facility: HOSPITAL | Age: 65
Discharge: HOME | End: 2025-04-17
Payer: MEDICARE

## 2025-04-17 VITALS — WEIGHT: 160 LBS | HEIGHT: 64 IN | TEMPERATURE: 97.8 F | BODY MASS INDEX: 27.31 KG/M2

## 2025-04-17 DIAGNOSIS — N20.0 KIDNEY STONES: ICD-10-CM

## 2025-04-17 DIAGNOSIS — R31.29 MICROSCOPIC HEMATURIA: ICD-10-CM

## 2025-04-17 DIAGNOSIS — N32.89 BLADDER SPASMS: ICD-10-CM

## 2025-04-17 DIAGNOSIS — R35.0 URINE FREQUENCY: ICD-10-CM

## 2025-04-17 DIAGNOSIS — N20.0 KIDNEY STONES: Primary | ICD-10-CM

## 2025-04-17 LAB
BILIRUB BLD-MCNC: NEGATIVE MG/DL
CLARITY, POC: CLEAR
COLOR UR: YELLOW
GLUCOSE UR STRIP-MCNC: NEGATIVE MG/DL
KETONES UR QL: ABNORMAL
LEUKOCYTE EST, POC: NEGATIVE
NITRITE UR-MCNC: NEGATIVE MG/ML
PH UR: 5 [PH] (ref 5–8)
PROT UR STRIP-MCNC: NEGATIVE MG/DL
RBC # UR STRIP: ABNORMAL /UL
SP GR UR: 1.02 (ref 1–1.03)
UROBILINOGEN UR QL: ABNORMAL

## 2025-04-17 PROCEDURE — 74018 RADEX ABDOMEN 1 VIEW: CPT

## 2025-04-17 PROCEDURE — 87086 URINE CULTURE/COLONY COUNT: CPT

## 2025-04-17 RX ORDER — PHENOL 1.4 %
AEROSOL, SPRAY (ML) MUCOUS MEMBRANE
COMMUNITY

## 2025-04-17 RX ORDER — OXYBUTYNIN CHLORIDE 5 MG/1
5 TABLET ORAL 3 TIMES DAILY PRN
Qty: 30 TABLET | Refills: 1 | Status: SHIPPED | OUTPATIENT
Start: 2025-04-17

## 2025-04-17 RX ORDER — DIAZEPAM 2 MG/1
2 TABLET ORAL
COMMUNITY

## 2025-04-19 LAB — BACTERIA SPEC AEROBE CULT: NO GROWTH

## 2025-04-21 ENCOUNTER — RESULTS FOLLOW-UP (OUTPATIENT)
Dept: UROLOGY | Facility: CLINIC | Age: 65
End: 2025-04-21

## 2025-04-21 ENCOUNTER — TELEPHONE (OUTPATIENT)
Dept: CARDIOLOGY | Facility: CLINIC | Age: 65
End: 2025-04-21

## 2025-04-21 NOTE — TELEPHONE ENCOUNTER
Caller: Mary Medley    Relationship: Self    Best call back number: [phone]    What is the best time to reach you: ANY, WILL BE EXPECTING A CALL FROM REF TEAM    Who are you requesting to speak with (clinical staff, provider,  specific staff member): DR YOON'S SCHEDULING TEAM    What was the call regarding: PT CALLED IN TO CHECK ON THE STATUS OF HER REF TO DR YOON. CURRENTLY, REF IS IN SCHEDULING REVIEW DUE TO DR YOON SCHEDULING. PLEASE NOTE, PT CONTINUES TO HAVE ONGOING CHEST TIGHTNESS.

## 2025-05-07 ENCOUNTER — OFFICE VISIT (OUTPATIENT)
Dept: CARDIOLOGY | Facility: CLINIC | Age: 65
End: 2025-05-07
Payer: MEDICARE

## 2025-05-07 VITALS
BODY MASS INDEX: 27.83 KG/M2 | WEIGHT: 163 LBS | DIASTOLIC BLOOD PRESSURE: 63 MMHG | SYSTOLIC BLOOD PRESSURE: 110 MMHG | HEART RATE: 63 BPM | HEIGHT: 64 IN | OXYGEN SATURATION: 98 %

## 2025-05-07 DIAGNOSIS — E78.5 HYPERLIPIDEMIA, UNSPECIFIED HYPERLIPIDEMIA TYPE: ICD-10-CM

## 2025-05-07 DIAGNOSIS — R07.9 CHEST PAIN, UNSPECIFIED TYPE: Primary | ICD-10-CM

## 2025-05-07 DIAGNOSIS — I10 ESSENTIAL HYPERTENSION: ICD-10-CM

## 2025-05-07 PROCEDURE — 93000 ELECTROCARDIOGRAM COMPLETE: CPT | Performed by: HOSPITALIST

## 2025-05-07 PROCEDURE — 3078F DIAST BP <80 MM HG: CPT | Performed by: HOSPITALIST

## 2025-05-07 PROCEDURE — 3074F SYST BP LT 130 MM HG: CPT | Performed by: HOSPITALIST

## 2025-05-07 PROCEDURE — 99204 OFFICE O/P NEW MOD 45 MIN: CPT | Performed by: HOSPITALIST

## 2025-05-07 RX ORDER — SODIUM CHLORIDE 9 MG/ML
40 INJECTION, SOLUTION INTRAVENOUS AS NEEDED
OUTPATIENT
Start: 2025-05-07

## 2025-05-07 RX ORDER — SODIUM CHLORIDE 0.9 % (FLUSH) 0.9 %
10 SYRINGE (ML) INJECTION EVERY 12 HOURS SCHEDULED
OUTPATIENT
Start: 2025-05-07

## 2025-05-07 RX ORDER — METOPROLOL TARTRATE 25 MG/1
200 TABLET, FILM COATED ORAL ONCE
OUTPATIENT
Start: 2025-05-07 | End: 2025-05-07

## 2025-05-07 RX ORDER — METOPROLOL TARTRATE 25 MG/1
150 TABLET, FILM COATED ORAL ONCE
OUTPATIENT
Start: 2025-05-07

## 2025-05-07 RX ORDER — METOPROLOL TARTRATE 50 MG
50 TABLET ORAL
OUTPATIENT
Start: 2025-05-07

## 2025-05-07 RX ORDER — NITROGLYCERIN 0.4 MG/1
0.8 TABLET SUBLINGUAL
OUTPATIENT
Start: 2025-05-07

## 2025-05-07 RX ORDER — OMEPRAZOLE 20 MG/1
20 CAPSULE, DELAYED RELEASE ORAL DAILY
COMMUNITY

## 2025-05-07 RX ORDER — METOPROLOL TARTRATE 25 MG/1
TABLET, FILM COATED ORAL
Qty: 1 TABLET | Refills: 0 | Status: SHIPPED | OUTPATIENT
Start: 2025-05-07

## 2025-05-07 RX ORDER — METOPROLOL TARTRATE 1 MG/ML
5 INJECTION, SOLUTION INTRAVENOUS
OUTPATIENT
Start: 2025-05-07

## 2025-05-07 RX ORDER — METOPROLOL TARTRATE 25 MG/1
100 TABLET, FILM COATED ORAL ONCE
OUTPATIENT
Start: 2025-05-07

## 2025-05-07 RX ORDER — DICLOFENAC SODIUM 75 MG/1
1 TABLET, DELAYED RELEASE ORAL EVERY 12 HOURS SCHEDULED
COMMUNITY
Start: 2025-04-23

## 2025-05-07 RX ORDER — SODIUM CHLORIDE 0.9 % (FLUSH) 0.9 %
10 SYRINGE (ML) INJECTION AS NEEDED
OUTPATIENT
Start: 2025-05-07

## 2025-05-07 RX ORDER — METOPROLOL TARTRATE 25 MG/1
50 TABLET, FILM COATED ORAL ONCE
OUTPATIENT
Start: 2025-05-07

## 2025-05-07 RX ORDER — NITROGLYCERIN 0.4 MG/1
0.4 TABLET SUBLINGUAL
OUTPATIENT
Start: 2025-05-07 | End: 2025-05-07

## 2025-05-07 NOTE — PROGRESS NOTES
Reason For Visit:  Chest pain    Subjective        Mary Medley is a 65 y.o. female with the below pertinent PMH who is self-referred for chest pain.    The patient previously was evaluated by Dr. Dan in 2019 for chest pain after an ED visit.  She underwent stress testing that was low risk for ischemia.  Since that time she also underwent stress testing in 2022 that was low risk for ischemia.    Today, the patient states that she had been doing well and had a good activity level without any significant issues until about a month ago when she started to notice some chest pain.  She states that she has been having episodes of chest discomfort sometimes at rest but sometimes with exertion including 1 episode while she was playing pickle ball that caused her to have to stop and rest.  She does feel like she has a little bit of shortness of breath as well.  She denies palpitations, lightheadedness, and other cardiac concerns.  She feels that her BP has been well-controlled recently.    ROS: Pertinent findings are included above.    Cardiac Studies  Echo 2/13/2018: LVEF 56 to 60%, normal diastolic function, normal RV size/function, mild MR, mild TR, no right-to-left shunt on bubble study  Stress echo 2/13/2018: Low risk for ischemia  Treadmill ECG stress 10/25/2019: Low risk study.  Excellent exercise capacity with no chest pain during exercise protocol and no ischemic ECG changes.  Patient did report noncardiac chest pain during recovery portion of the test.  Lexiscan myocardial perfusion SPECT: Low risk study.  Normal myocardial perfusion with no evidence of ischemia.  Normal LV function.  Nondiagnostic treadmill test.  Average exercise capacity (7-minute 9-second on standard Boom protocol but inability to achieve target HR.  Achieved 118 bpm with target 137 bpm and did complain of exercise limiting chest pain.  Stress echo 4/12/2022: Low risk test.  All LV wall segments demonstrate appropriate increase in  contractility with the budipine infusion.    Pertinent PMH  Hypertension  Hypothyroidism  GERD  Multiple sclerosis  Family History: Father  of an MI at age 62    Pertinent past medical, surgical, family, and social history were reviewed and updated in the EMR.      Current Outpatient Medications:     acetaminophen (TYLENOL) 325 MG tablet, Take 300 mg by mouth As Needed., Disp: , Rfl:     amLODIPine (NORVASC) 2.5 MG tablet, , Disp: , Rfl:     aspirin 81 MG EC tablet, Take 1 tablet by mouth Daily., Disp: , Rfl:     baclofen (LIORESAL) 10 MG tablet, Take 1 tablet by mouth 3 (Three) Times a Day As Needed., Disp: , Rfl:     brimonidine (ALPHAGAN) 0.2 % ophthalmic solution, Administer 1 drop into the left eye 2 (Two) Times a Day., Disp: , Rfl:     Cholecalciferol 50 MCG (2000 UT) tablet, Daily., Disp: , Rfl:     diazePAM (VALIUM) 2 MG tablet, 1 tablet., Disp: , Rfl:     Dimethyl Fumarate (TECFIDERA) capsule delayed-release, Take 1 capsule by mouth 2 (Two) Times a Day., Disp: , Rfl:     gabapentin (NEURONTIN) 300 MG capsule, Take 1 capsule by mouth 3 (Three) Times a Day., Disp: , Rfl:     gabapentin (NEURONTIN) 600 MG tablet, Take 1 tablet by mouth 3 (Three) Times a Day., Disp: , Rfl:     HYDROcodone-acetaminophen (NORCO) 5-325 MG per tablet, Take 1 tablet by mouth 3 (Three) Times a Day., Disp: , Rfl:     levothyroxine (SYNTHROID, LEVOTHROID) 150 MCG tablet, Take 1 tablet by mouth Daily., Disp: , Rfl:     Melatonin 10 MG tablet, Take  by mouth., Disp: , Rfl:     meloxicam (MOBIC) 15 MG tablet, Take 1 tablet by mouth Daily., Disp: , Rfl:     olmesartan (BENICAR) 40 MG tablet, Take 1 tablet by mouth Daily., Disp: , Rfl:     OXcarbazepine (TRILEPTAL) 300 MG tablet, Take 1 tablet by mouth 2 (Two) Times a Day., Disp: , Rfl:     oxybutynin (DITROPAN) 5 MG tablet, Take 1 tablet by mouth 3 (Three) Times a Day As Needed (bladder spasms)., Disp: 30 tablet, Rfl: 1    traZODone (DESYREL) 150 MG tablet, Take 2 tablets by mouth  "Every Night., Disp: , Rfl:      Objective   Vital Signs:  /63   Pulse 63   Ht 162.6 cm (64\")   Wt 73.9 kg (163 lb)   SpO2 98%   BMI 27.98 kg/m²   Estimated body mass index is 27.98 kg/m² as calculated from the following:    Height as of this encounter: 162.6 cm (64\").    Weight as of this encounter: 73.9 kg (163 lb).      Constitutional:       Appearance: Healthy appearance. Not in distress.   Neck:      Vascular: JVD normal.   Pulmonary:      Effort: Pulmonary effort is normal.      Breath sounds: Normal breath sounds.   Cardiovascular:      Normal rate. Regular rhythm.      Murmurs: There is no murmur.      No gallop.  No click. No rub.   Edema:     Peripheral edema absent.   Abdominal:      General: There is no distension.      Palpations: Abdomen is soft.      Tenderness: There is no abdominal tenderness.   Skin:     General: Skin is warm and dry.   Neurological:      Mental Status: Alert and oriented to person, place and time.        Result Review :  The following data was reviewed by: Royal Castro MD on 05/07/2025:  CMP   CMP          11/22/2024    09:27   CMP   Glucose 126    BUN 23    Creatinine 0.70    EGFR 97.9    Sodium 141    Potassium 4.2    Chloride 99    Calcium 9.9    Total Protein 7.8    Albumin 4.8    Globulin 3.0    Total Bilirubin <0.1    Alkaline Phosphatase 116    AST (SGOT) 32    ALT (SGPT) 23    Albumin/Globulin Ratio 1.6    BUN/Creatinine Ratio 32.9    Anion Gap 13.0      CBC   CBC          11/22/2024    09:27   CBC   WBC 6.80    RBC 4.02    Hemoglobin 12.5    Hematocrit 38.9    MCV 96.8    MCH 31.1    MCHC 32.1    RDW 13.4    Platelets 323      Lipid Panel   Lipid Panel          11/22/2024    09:27   Lipid Panel   Total Cholesterol 251    Triglycerides 171    HDL Cholesterol 79    VLDL Cholesterol 30    LDL Cholesterol  142    LDL/HDL Ratio 1.74             ECG 12 Lead    Date/Time: 5/7/2025 11:41 AM  Performed by: Royal Castro MD    Authorized by: Royal Castro MD  " Comparison: compared with previous ECG from 10/17/2022  Similar to previous ECG  Rhythm: sinus rhythm  Rate: normal  BPM: 63  Conduction: conduction normal  QRS axis: normal    Clinical impression: normal ECG            Assessment and Plan   Diagnoses and all orders for this visit:    1. Chest pain, unspecified type (Primary)  -     CT Angiogram Coronary; Future  -     CT Angiogram Coronary-Cardiology Interpretation; Future  -     metoprolol tartrate (LOPRESSOR) tablet 200 mg  -     metoprolol tartrate (LOPRESSOR) tablet 150 mg  -     metoprolol tartrate (LOPRESSOR) tablet 100 mg  -     metoprolol tartrate (LOPRESSOR) tablet 50 mg  -     metoprolol tartrate (LOPRESSOR) tablet 50 mg  -     metoprolol tartrate (LOPRESSOR) injection 5 mg  -     nitroglycerin (NITROSTAT) SL tablet 0.4 mg  -     nitroglycerin (NITROSTAT) SL tablet 0.8 mg  -     No Caffeine or Nicotine 4 Hours Prior to CTA Appointment  -     Nothing to Eat or Drink 4 Hours Prior to CTA Appointment  -     Do Not Take Phosphodiasterase Inhibitors in the 72 Hours Prior to Coronary CTA  -     Obtain Informed Consent - Computed Tomography Angiography of Chest - Angiogram of Coronary Arteries; Standing  -     Vital Signs Upon Arrival; Standing  -     Cardiac Monitoring; Standing  -     Verify NPO Status - Patient to Be NPO at Least 4 Hours Prior to CTA; Standing  -     Notify CT After Administration of metoprolol tartrate (LOPRESSOR) tablet; Standing  -     Notify Provider If Total Metoprolol Given Equals 300mg & Heart Rate Not At Goal; Standing  -     Notify Provider Prior to Administration of Nitroglycerin if Patient SBP <80; Standing  -     POC Creatinine; Standing  -     Insert Peripheral IV; Standing  -     Saline Lock & Maintain IV Access; Standing  -     sodium chloride 0.9 % flush 10 mL  -     sodium chloride 0.9 % flush 10 mL  -     sodium chloride 0.9 % infusion 40 mL  -     Vital Signs - See Instructions; Standing  -     Hold Medication Metformin  (Glucophage, Glucophage XR, Fortament, Glumetza);  Metglip (metformin/glipizide);  Glucovance (metformin/glyburide); Avandamet (metformin/rosiglitazone); Standing  -     Patient May Discharge Home After Procedure Complete (If Stable); Standing  -     metoprolol tartrate (LOPRESSOR) 25 MG tablet; Take 12.5 mg (half tablet) at Bedtime the Night Before Coronary CTA Appointment and In the Morning 1 Hour Prior to Coronary CTA Appointment. Do not take if heart rate less than 60.  Dispense: 1 tablet; Refill: 0    2. Essential hypertension    3. Hyperlipidemia, unspecified hyperlipidemia type    Other orders  -     ECG 12 Lead      -Somewhat vague chest pain sometimes sounding atypical but sometimes with exertion and improving with rest.  She has had multiple stress test in the past, so we agreed on a plan for an alternative type testing that may also help evaluate for nonocclusive CAD even if she does not have any obstructive disease contributing to her symptoms.  Plan to proceed with coronary CTA as noted above.  I am sending a prescription for metoprolol 12.5 mg for her to take the night before and the day of her coronary CT to help with HR control at the time of the study.  - BP does appear reasonably well-controlled at present.  Plan to continue her current dose of olmesartan, and amlodipine  - She will tentatively continue aspirin 81 mg daily for now.  If no coronary disease noted on her coronary CTA, this may be discontinued.  - She does have some mild hyperlipidemia although her ASCVD risk score is only 4.3% so I will hold off on adding any lipid-lowering therapy for now.  Plan to reassess after her coronary CTA with calcium score.    Follow Up   Return in about 3 months (around 8/7/2025).  Patient was given instructions and counseling regarding her condition or for health maintenance advice. Please see specific information pulled into the AVS if appropriate.       EMR Dragon/Transcription disclaimer: Much of this  encounter note is an electronic transcription/translation of spoken language to printed text. The electronic translation of spoken language may permit erroneous, or at times, nonsensical words or phrases to be inadvertently transcribed; although I have reviewed the note for such errors, some may still exist.

## 2025-05-20 ENCOUNTER — TELEPHONE (OUTPATIENT)
Dept: CT IMAGING | Facility: HOSPITAL | Age: 65
End: 2025-05-20
Payer: MEDICARE

## 2025-05-21 ENCOUNTER — HOSPITAL ENCOUNTER (OUTPATIENT)
Dept: CT IMAGING | Facility: HOSPITAL | Age: 65
Discharge: HOME | End: 2025-05-21
Payer: MEDICARE

## 2025-05-21 VITALS
DIASTOLIC BLOOD PRESSURE: 55 MMHG | TEMPERATURE: 98 F | SYSTOLIC BLOOD PRESSURE: 116 MMHG | BODY MASS INDEX: 27.4 KG/M2 | WEIGHT: 160.5 LBS | RESPIRATION RATE: 11 BRPM | HEIGHT: 64 IN | OXYGEN SATURATION: 96 % | HEART RATE: 68 BPM

## 2025-05-21 DIAGNOSIS — R93.1 ABNORMAL FINDINGS DIAGNOSTIC IMAGING OF HEART AND CORONARY CIRCULATION: Primary | ICD-10-CM

## 2025-05-21 DIAGNOSIS — R07.9 CHEST PAIN, UNSPECIFIED TYPE: ICD-10-CM

## 2025-05-21 DIAGNOSIS — R93.1 ABNORMAL FINDINGS DIAGNOSTIC IMAGING OF HEART AND CORONARY CIRCULATION: ICD-10-CM

## 2025-05-21 LAB — CREAT BLDA-MCNC: 0.9 MG/DL (ref 0.6–1.3)

## 2025-05-21 PROCEDURE — 82565 ASSAY OF CREATININE: CPT | Performed by: HOSPITALIST

## 2025-05-21 PROCEDURE — 25510000001 IOPAMIDOL PER 1 ML: Performed by: HOSPITALIST

## 2025-05-21 PROCEDURE — 75574 CT ANGIO HRT W/3D IMAGE: CPT

## 2025-05-21 PROCEDURE — 75580 N-INVAS EST C FFR SW ALY CTA: CPT

## 2025-05-21 RX ORDER — SODIUM CHLORIDE 0.9 % (FLUSH) 0.9 %
10 SYRINGE (ML) INJECTION AS NEEDED
Status: DISCONTINUED | OUTPATIENT
Start: 2025-05-21 | End: 2025-05-22 | Stop reason: HOSPADM

## 2025-05-21 RX ORDER — METOPROLOL TARTRATE 1 MG/ML
5 INJECTION, SOLUTION INTRAVENOUS
Status: DISCONTINUED | OUTPATIENT
Start: 2025-05-21 | End: 2025-05-22 | Stop reason: HOSPADM

## 2025-05-21 RX ORDER — SODIUM CHLORIDE 9 MG/ML
40 INJECTION, SOLUTION INTRAVENOUS AS NEEDED
Status: DISCONTINUED | OUTPATIENT
Start: 2025-05-21 | End: 2025-05-22 | Stop reason: HOSPADM

## 2025-05-21 RX ORDER — METOPROLOL TARTRATE 50 MG
50 TABLET ORAL ONCE
Status: DISCONTINUED | OUTPATIENT
Start: 2025-05-21 | End: 2025-05-22 | Stop reason: HOSPADM

## 2025-05-21 RX ORDER — METOPROLOL TARTRATE 100 MG/1
200 TABLET ORAL ONCE
Status: DISCONTINUED | OUTPATIENT
Start: 2025-05-21 | End: 2025-05-22 | Stop reason: HOSPADM

## 2025-05-21 RX ORDER — IOPAMIDOL 755 MG/ML
100 INJECTION, SOLUTION INTRAVASCULAR
Status: COMPLETED | OUTPATIENT
Start: 2025-05-21 | End: 2025-05-21

## 2025-05-21 RX ORDER — METOPROLOL TARTRATE 100 MG/1
100 TABLET ORAL ONCE
Status: DISCONTINUED | OUTPATIENT
Start: 2025-05-21 | End: 2025-05-22 | Stop reason: HOSPADM

## 2025-05-21 RX ORDER — SODIUM CHLORIDE 0.9 % (FLUSH) 0.9 %
10 SYRINGE (ML) INJECTION EVERY 12 HOURS SCHEDULED
Status: DISCONTINUED | OUTPATIENT
Start: 2025-05-21 | End: 2025-05-22 | Stop reason: HOSPADM

## 2025-05-21 RX ORDER — NITROGLYCERIN 0.4 MG/1
0.8 TABLET SUBLINGUAL
Status: COMPLETED | OUTPATIENT
Start: 2025-05-21 | End: 2025-05-21

## 2025-05-21 RX ORDER — NITROGLYCERIN 0.4 MG/1
0.4 TABLET SUBLINGUAL
Status: COMPLETED | OUTPATIENT
Start: 2025-05-21 | End: 2025-05-21

## 2025-05-21 RX ORDER — METOPROLOL TARTRATE 50 MG
50 TABLET ORAL
Status: DISCONTINUED | OUTPATIENT
Start: 2025-05-21 | End: 2025-05-22 | Stop reason: HOSPADM

## 2025-05-21 RX ADMIN — IOPAMIDOL 100 ML: 755 INJECTION, SOLUTION INTRAVENOUS at 10:30

## 2025-05-21 RX ADMIN — NITROGLYCERIN 0.8 MG: 0.4 TABLET SUBLINGUAL at 10:14

## 2025-05-27 ENCOUNTER — TELEPHONE (OUTPATIENT)
Dept: SURGERY | Facility: CLINIC | Age: 65
End: 2025-05-27
Payer: MEDICARE

## 2025-05-27 ENCOUNTER — TELEPHONE (OUTPATIENT)
Dept: CARDIOLOGY | Facility: CLINIC | Age: 65
End: 2025-05-27
Payer: MEDICARE

## 2025-05-27 RX ORDER — ISOSORBIDE MONONITRATE 30 MG/1
30 TABLET, EXTENDED RELEASE ORAL DAILY
Qty: 30 TABLET | Refills: 11 | Status: SHIPPED | OUTPATIENT
Start: 2025-05-27

## 2025-05-27 NOTE — TELEPHONE ENCOUNTER
The patient calls and leaves a message that she has had more the CP over the weekend so she would like to try the Imdur.  Her call is returned with no answer to further clarify her condition but she did not answer.  Message is left for her that the request for the Imdur will be sent to Dr. Castro but if she is experiencing ongoing or worsening CP she should be evaluated at ED/urgent care.  Pleased send RX to her [facility] pharmacy.  Velma Lyaton MA

## 2025-05-27 NOTE — TELEPHONE ENCOUNTER
Attempted to contact PT regarding scheduling for their referral. I left them a voicemail with our office number and requested they call us back at their earliest convenience to get scheduled.     [facility]  5/27/2025

## 2025-05-28 ENCOUNTER — HOSPITAL ENCOUNTER (OUTPATIENT)
Dept: MAMMOGRAPHY | Facility: HOSPITAL | Age: 65
Discharge: HOME | End: 2025-05-28
Payer: MEDICARE

## 2025-05-28 ENCOUNTER — HOSPITAL ENCOUNTER (OUTPATIENT)
Dept: CT IMAGING | Facility: HOSPITAL | Age: 65
Discharge: HOME | End: 2025-05-28
Payer: MEDICARE

## 2025-05-28 ENCOUNTER — TELEPHONE (OUTPATIENT)
Dept: SURGERY | Facility: CLINIC | Age: 65
End: 2025-05-28
Payer: MEDICARE

## 2025-05-28 DIAGNOSIS — N32.89 BLADDER SPASMS: ICD-10-CM

## 2025-05-28 DIAGNOSIS — R31.29 MICROSCOPIC HEMATURIA: ICD-10-CM

## 2025-05-28 DIAGNOSIS — Z12.31 OTHER SCREENING MAMMOGRAM: ICD-10-CM

## 2025-05-28 DIAGNOSIS — N20.0 KIDNEY STONES: ICD-10-CM

## 2025-05-28 LAB — CREAT BLDA-MCNC: 0.9 MG/DL (ref 0.6–1.3)

## 2025-05-28 PROCEDURE — 77063 BREAST TOMOSYNTHESIS BI: CPT

## 2025-05-28 PROCEDURE — 74178 CT ABD&PLV WO CNTR FLWD CNTR: CPT

## 2025-05-28 PROCEDURE — 25510000001 IOPAMIDOL 61 % SOLUTION

## 2025-05-28 PROCEDURE — 77067 SCR MAMMO BI INCL CAD: CPT

## 2025-05-28 PROCEDURE — 82565 ASSAY OF CREATININE: CPT

## 2025-05-28 RX ORDER — IOPAMIDOL 612 MG/ML
100 INJECTION, SOLUTION INTRAVASCULAR
Status: COMPLETED | OUTPATIENT
Start: 2025-05-28 | End: 2025-05-28

## 2025-05-28 RX ADMIN — IOPAMIDOL 100 ML: 612 INJECTION, SOLUTION INTRAVENOUS at 09:27

## 2025-05-28 NOTE — TELEPHONE ENCOUNTER
Attempted to contact PT regarding scheduling for their referral. I left them a voicemail with our office number and requested they call us back at their earliest convenience to get scheduled.     [facility]  5/28/2025

## 2025-05-29 ENCOUNTER — TELEPHONE (OUTPATIENT)
Dept: SURGERY | Facility: CLINIC | Age: 65
End: 2025-05-29
Payer: MEDICARE

## 2025-05-29 ENCOUNTER — TELEPHONE (OUTPATIENT)
Dept: CARDIOLOGY | Facility: CLINIC | Age: 65
End: 2025-05-29
Payer: MEDICARE

## 2025-05-29 NOTE — TELEPHONE ENCOUNTER
Pt called and left a voice message on Velma's phone stating ever since she started the Isosorbide her BP has been dropping. Last reading was 94/37.  She takes BP med but can not get a hold of her pcp so she called us today asking what to do?  Please advise.  Joshua Apodaca, CMA

## 2025-05-29 NOTE — TELEPHONE ENCOUNTER
Spoke with patient to let her know  Nonobstructing renal stones seen. 1 very small punctate tiny stone in the right kidney along with 3 stones in the left all of which nonobstructing. No bladder abnormality visualized. No specific finding to explain blood in urine.     Patient states she is still having blood in her urine and flank pain. Patient wants to know what she needs to do from here. I let her know I would get a message over to Serena and give her a call back.

## 2025-05-29 NOTE — TELEPHONE ENCOUNTER
Attempted to contact PT regarding scheduling for their referral. I left them a voicemail with our office number and requested they call us back at their earliest convenience to get scheduled.     [facility]  5/29/2025

## 2025-05-30 NOTE — TELEPHONE ENCOUNTER
I called pt this morning and left her a message stating the instructions and to call me back if any questions.  I will call her this afternoon to follow up on this call.  Joshua Apodaca, CMA

## 2025-06-02 ENCOUNTER — TELEPHONE (OUTPATIENT)
Dept: CARDIOLOGY | Facility: CLINIC | Age: 65
End: 2025-06-02
Payer: MEDICARE

## 2025-06-02 NOTE — TELEPHONE ENCOUNTER
The patient calls with c/o low BP since starting the Imdur and would like advisement on what to do.  She is asked if she has decreased her Olmesartan to 20m daily as she was advised to do last week and she said she forgot to do that so she will start the lower dose now and continue to keep a record of her BP and let us know if any further problems for further medication adjustment if needed.  Velma Layton MA

## 2025-06-04 ENCOUNTER — PATIENT ROUNDING (BHMG ONLY) (OUTPATIENT)
Dept: SURGERY | Facility: CLINIC | Age: 65
End: 2025-06-04
Payer: MEDICARE

## 2025-06-04 ENCOUNTER — LAB (OUTPATIENT)
Dept: LAB | Facility: HOSPITAL | Age: 65
End: 2025-06-04
Payer: MEDICARE

## 2025-06-04 ENCOUNTER — TELEPHONE (OUTPATIENT)
Age: 65
End: 2025-06-04
Payer: MEDICARE

## 2025-06-04 ENCOUNTER — TELEPHONE (OUTPATIENT)
Dept: SURGERY | Facility: CLINIC | Age: 65
End: 2025-06-04
Payer: MEDICARE

## 2025-06-04 ENCOUNTER — OFFICE VISIT (OUTPATIENT)
Dept: SURGERY | Facility: CLINIC | Age: 65
End: 2025-06-04
Payer: MEDICARE

## 2025-06-04 ENCOUNTER — HOSPITAL ENCOUNTER (OUTPATIENT)
Dept: ULTRASOUND IMAGING | Facility: HOSPITAL | Age: 65
Discharge: HOME | End: 2025-06-04
Payer: MEDICARE

## 2025-06-04 ENCOUNTER — TRANSCRIBE ORDERS (OUTPATIENT)
Dept: ADMINISTRATIVE | Facility: HOSPITAL | Age: 65
End: 2025-06-04
Payer: MEDICARE

## 2025-06-04 VITALS
HEART RATE: 80 BPM | DIASTOLIC BLOOD PRESSURE: 64 MMHG | BODY MASS INDEX: 27.83 KG/M2 | WEIGHT: 163 LBS | OXYGEN SATURATION: 99 % | SYSTOLIC BLOOD PRESSURE: 120 MMHG | HEIGHT: 64 IN

## 2025-06-04 DIAGNOSIS — R22.41 LEG MASS, RIGHT: ICD-10-CM

## 2025-06-04 DIAGNOSIS — R22.41 LEG MASS, RIGHT: Primary | ICD-10-CM

## 2025-06-04 DIAGNOSIS — E66.3 OVERWEIGHT WITH BODY MASS INDEX (BMI) OF 27 TO 27.9 IN ADULT: ICD-10-CM

## 2025-06-04 DIAGNOSIS — I10 HTN (HYPERTENSION) WITH GOAL TO BE DETERMINED: ICD-10-CM

## 2025-06-04 DIAGNOSIS — I10 HTN (HYPERTENSION) WITH GOAL TO BE DETERMINED: Primary | ICD-10-CM

## 2025-06-04 LAB
ANION GAP SERPL CALCULATED.3IONS-SCNC: 7 MMOL/L (ref 4–13)
BUN SERPL-MCNC: 26 MG/DL (ref 5–21)
BUN/CREAT SERPL: 43.3
CALCIUM SPEC-SCNC: 9.6 MG/DL (ref 8.6–10.5)
CHLORIDE SERPL-SCNC: 101 MMOL/L (ref 98–110)
CO2 SERPL-SCNC: 29 MMOL/L (ref 24–31)
CREAT SERPL-MCNC: 0.6 MG/DL (ref 0.5–1.4)
EGFRCR SERPLBLD CKD-EPI 2021: 101 ML/MIN/1.73
GLUCOSE SERPL-MCNC: 93 MG/DL (ref 65–99)
POTASSIUM SERPL-SCNC: 4.7 MMOL/L (ref 3.5–5.3)
SODIUM SERPL-SCNC: 137 MMOL/L (ref 135–145)

## 2025-06-04 PROCEDURE — 80048 BASIC METABOLIC PNL TOTAL CA: CPT

## 2025-06-04 PROCEDURE — 76999 ECHO EXAMINATION PROCEDURE: CPT

## 2025-06-04 PROCEDURE — 36415 COLL VENOUS BLD VENIPUNCTURE: CPT

## 2025-06-04 RX ORDER — HEPARIN SODIUM 5000 [USP'U]/ML
5000 INJECTION, SOLUTION INTRAVENOUS; SUBCUTANEOUS EVERY 8 HOURS SCHEDULED
OUTPATIENT
Start: 2025-06-04

## 2025-06-04 NOTE — TELEPHONE ENCOUNTER
I attempted to contact the patient to go over all of her surgery information with her. I left her a voicemail with our office number and requested she give us a call back at her earliest convenience.    EUGENIA  06/04

## 2025-06-04 NOTE — H&P (VIEW-ONLY)
Office New Patient History and Physical:     Referring Provider: Jose Ramon Madrigal MD    Chief Complaint   Patient presents with    Follow-up       Subjective .     History of present illness:    History of Present Illness  The patient presents for evaluation of a mass on the back of her right thigh.    She first observed the mass in 10/2024, following an incident where she fell down some stairs and landed on her leg. The mass has since decreased in size from its original state. Initially, the area was bruised. She is not currently on any anticoagulant therapy and has no history of surgical intervention in the affected area. The mass does not cause pain during physical activity or when lying on the affected side. However, it becomes more prominent during these activities.    SOCIAL HISTORY  Exercise: Plays pickleball  Tobacco: Does not smoke cigarettes       History  Past Medical History:   Diagnosis Date    Arthritis     Cholelithiasis Gallbladder removed    Claustrophobia     Dental disease     Dizziness     Glaucoma     History of transfusion     Hypertension     Hypothyroidism     MS (multiple sclerosis)     Pancreatitis Pancreatitis attack     PONV (postoperative nausea and vomiting)    ,   Past Surgical History:   Procedure Laterality Date    APPENDECTOMY       SECTION      CHOLECYSTECTOMY      COLONOSCOPY N/A 2020    Procedure: COLONOSCOPY WITH ANESTHESIA;  Surgeon: Eduard Taylor MD;  Location: [facility] ENDOSCOPY;  Service: Gastroenterology;  Laterality: N/A;  pre: screen  post: normal   Jose Ramon Agarwal MD    ELBOW ARTHROSCOPY Left 2019    ENDOSCOPY N/A 2020    Procedure: ESOPHAGOGASTRODUODENOSCOPY WITH ANESTHESIA;  Surgeon: Eduard Taylor MD;  Location: [facility] ENDOSCOPY;  Service: Gastroenterology;  Laterality: N/A;  pre: Pharyngoesophageal dysphagia  post: esophagitis   Jose Ramon Agarwal MD    ENDOSCOPY N/A 2023    Procedure: ESOPHAGOGASTRODUODENOSCOPY WITH  ANESTHESIA;  Surgeon: Eduard Taylor MD;  Location: [facility] ENDOSCOPY;  Service: Gastroenterology;  Laterality: N/A;  pre Pharyngoesophageal dysphagia  post: esophagus dilated  Dr.O Morse    EXTRACORPOREAL SHOCK WAVE LITHOTRIPSY (ESWL) Right 10/19/2022    Procedure: EXTRACORPOREAL SHOCKWAVE LITHOTRIPSY-right;  Surgeon: Leonardo Luna MD;  Location: [facility] OR;  Service: Urology;  Laterality: Right;    HAND RECONSTRUCTION Right     HYSTERECTOMY      JOINT REPLACEMENT Left     OOPHORECTOMY      REPLACEMENT TOTAL KNEE Left     TONSILLECTOMY      TOTAL SHOULDER REPLACEMENT Right 2024    UPPER GASTROINTESTINAL ENDOSCOPY  20   ,   Family History   Problem Relation Age of Onset    Breast cancer Maternal Aunt     Heart failure Mother     Cancer Mother     Heart disease Father     Heart attack Father     Heart failure Father     Rheum arthritis Sister     No Known Problems Brother     No Known Problems Daughter     No Known Problems Son     No Known Problems Maternal Grandmother     No Known Problems Paternal Grandmother     No Known Problems Paternal Aunt     No Known Problems Other     BRCA 1/2 Neg Hx     Colon cancer Neg Hx     Endometrial cancer Neg Hx     Ovarian cancer Neg Hx     Colon polyps Neg Hx    ,   Social History     Tobacco Use    Smoking status: Former     Current packs/day: 0.00     Types: Cigarettes     Start date:      Quit date:      Years since quittin.4     Passive exposure: Never    Smokeless tobacco: Never   Vaping Use    Vaping status: Never Used   Substance Use Topics    Alcohol use: Yes     Comment: occ    Drug use: No   , (Not in a hospital admission)   and Allergies:  Reglan [metoclopramide], Cephalexin, Cephalosporins, Ciprofloxacin, Erythromycin, Macrolides and ketolides, Morphine, Penicillins, Quinolones, Sulfa antibiotics, Thorazine [chlorpromazine], and Zofran [ondansetron hcl]    Current Outpatient Medications:     acetaminophen (TYLENOL) 325 MG tablet, Take 300  mg by mouth As Needed., Disp: , Rfl:     amLODIPine (NORVASC) 2.5 MG tablet, , Disp: , Rfl:     aspirin 81 MG EC tablet, Take 1 tablet by mouth Daily., Disp: , Rfl:     atorvastatin (LIPITOR) 20 MG tablet, Take 1 tablet by mouth Daily., Disp: 90 tablet, Rfl: 3    baclofen (LIORESAL) 10 MG tablet, Take 1 tablet by mouth 3 (Three) Times a Day As Needed., Disp: , Rfl:     brimonidine (ALPHAGAN) 0.2 % ophthalmic solution, Administer 1 drop into the left eye 2 (Two) Times a Day., Disp: , Rfl:     Cholecalciferol 50 MCG (2000 UT) tablet, Daily., Disp: , Rfl:     diazePAM (VALIUM) 2 MG tablet, 1 tablet. (Patient taking differently: Take 1 tablet by mouth Every 6 (Six) Hours As Needed.), Disp: , Rfl:     diclofenac (VOLTAREN) 75 MG EC tablet, Take 1 tablet by mouth Every 12 (Twelve) Hours., Disp: , Rfl:     Dimethyl Fumarate (TECFIDERA) capsule delayed-release, Take 1 capsule by mouth 2 (Two) Times a Day., Disp: , Rfl:     gabapentin (NEURONTIN) 300 MG capsule, Take 1 capsule by mouth 3 (Three) Times a Day., Disp: , Rfl:     gabapentin (NEURONTIN) 600 MG tablet, Take 1 tablet by mouth 3 (Three) Times a Day., Disp: , Rfl:     HYDROcodone-acetaminophen (NORCO) 5-325 MG per tablet, Take 1 tablet by mouth 3 (Three) Times a Day., Disp: , Rfl:     isosorbide mononitrate (IMDUR) 30 MG 24 hr tablet, Take 1 tablet by mouth Daily., Disp: 30 tablet, Rfl: 11    levothyroxine (SYNTHROID, LEVOTHROID) 150 MCG tablet, Take 1 tablet by mouth Daily., Disp: , Rfl:     Melatonin 10 MG tablet, Take  by mouth., Disp: , Rfl:     meloxicam (MOBIC) 15 MG tablet, Take 1 tablet by mouth Daily., Disp: , Rfl:     metoprolol tartrate (LOPRESSOR) 25 MG tablet, Take 12.5 mg (half tablet) at Bedtime the Night Before Coronary CTA Appointment and In the Morning 1 Hour Prior to Coronary CTA Appointment. Do not take if heart rate less than 60., Disp: 1 tablet, Rfl: 0    olmesartan (BENICAR) 40 MG tablet, Take 1 tablet by mouth Daily., Disp: , Rfl:      "omeprazole (priLOSEC) 20 MG capsule, Take 1 capsule by mouth Daily., Disp: , Rfl:     OXcarbazepine (TRILEPTAL) 300 MG tablet, Take 1 tablet by mouth 2 (Two) Times a Day., Disp: , Rfl:     oxybutynin (DITROPAN) 5 MG tablet, Take 1 tablet by mouth 3 (Three) Times a Day As Needed (bladder spasms)., Disp: 30 tablet, Rfl: 1    traZODone (DESYREL) 150 MG tablet, Take 2 tablets by mouth Every Night., Disp: , Rfl:       Review of Systems    Review of Systems - General ROS: negative  ENT ROS: negative  Respiratory ROS: no cough, shortness of breath, or wheezing  Cardiovascular ROS: no chest pain or dyspnea on exertion  Gastrointestinal ROS: no abdominal pain, change in bowel habits, or black or bloody stools  Genito-Urinary ROS: no dysuria, trouble voiding, or hematuria  Dermatological ROS: negative   Breast ROS: negative for breast lumps  Hematological and Lymphatic ROS: negative  Musculoskeletal ROS: right posterior thigh mass    Neurological ROS: no TIA or stroke symptoms    Psychological ROS: negative  Endocrine ROS: negative    Objective     Vital Signs   /64 (BP Location: Right arm, Patient Position: Sitting, Cuff Size: Adult)   Pulse 80   Ht 162.6 cm (64\")   Wt 73.9 kg (163 lb)   SpO2 99%   BMI 27.98 kg/m²      Physical Exam:  General appearance - alert, well appearing, and in no distress  Mental status - alert, oriented to person, place, and time  Eyes - pupils equal and reactive, extraocular eye movements intact  Neurological - alert, oriented, normal speech, no focal findings or movement disorder noted  Musculoskeletal - right posterior upper thigh mass, soft   Physical Exam       Results Review:     The following data was reviewed by: Brittany Lerma MD on 06/04/2025:  US Soft Tissue (06/04/2025 10:52)   In the palpable area there is a linear fluid collection in the deep  subcutaneous soft tissues measuring 3.9 x 0.5 x 5.5 cm may represent a  resolving hematoma. No solid mass or lymphadenopathy is " seen. No  drainable fluid collection is seen.    Assessment & Plan       Diagnoses and all orders for this visit:    1. Leg mass, right (Primary)  -     US Soft Tissue; Future  -     Case Request; Standing  -     XR chest 1 vw; Future  -     ECG 12 Lead; Future  -     heparin (porcine) 5000 UNIT/ML injection 5,000 Units  -     CBC & Differential; Future  -     Comprehensive Metabolic Panel; Future  -     ceFAZolin (ANCEF) 2,000 mg in sodium chloride 0.9 % 100 mL IVPB    2. Overweight with body mass index (BMI) of 27 to 27.9 in adult    Other orders  -     Follow Anesthesia Guidelines / Protocol; Future  -     Follow Anesthesia Guidelines / Protocol; Standing  -     Verify / Perform Chlorhexidine Skin Prep; Standing  -     Provide NPO Instructions to Patient; Future  -     Chlorhexidine Skin Prep; Future  -     Notify physician (specify); Standing  -     Instructions on coughing, deep breathing, and incentive spirometry.; Standing  -     Oxygen Therapy-; Standing  -     Place Sequential Compression Device; Standing  -     Maintain Sequential Compression Device; Standing       Assessment & Plan  1. Right posterior thigh mass.  The differential diagnosis includes a hematoma or a lipoma, given the patient's history of injury and the mass's characteristics. An ultrasound was done today that showed a hematoma. She would like to proceed with surgical excision. The procedure will involve making an incision, removing the mass, and using dissolvable stitches under the skin with glue on top. To prevent fluid accumulation post-surgery, the patient has been advised to wear tight bike shorts or spanks that extend below the area of the mass. The potential risk of fluid accumulation post-surgery, which may necessitate drainage with a needle in the office, has been discussed.     This is a chronic problem. I have reviewed the US above and ordered the above prework. She is at increased risk of perioperative complications 2/2 her  elevated BMI.     BMI is >= 25 and <30. (Overweight) The following options were offered after discussion;: weight loss educational material (shared in after visit summary)      Brittany Lerma MD  06/04/25  15:23 CDT      Patient or patient representative verbalized consent for the use of Ambient Listening during the visit with  Brittany Lerma MD for chart documentation. 6/4/2025  15:18 CDT

## 2025-06-04 NOTE — PROGRESS NOTES
Office New Patient History and Physical:     Referring Provider: Jose Ramon aMdrigal MD    Chief Complaint   Patient presents with    Follow-up       Subjective .     History of present illness:    History of Present Illness  The patient presents for evaluation of a mass on the back of her right thigh.    She first observed the mass in 10/2024, following an incident where she fell down some stairs and landed on her leg. The mass has since decreased in size from its original state. Initially, the area was bruised. She is not currently on any anticoagulant therapy and has no history of surgical intervention in the affected area. The mass does not cause pain during physical activity or when lying on the affected side. However, it becomes more prominent during these activities.    SOCIAL HISTORY  Exercise: Plays pickleball  Tobacco: Does not smoke cigarettes       History  Past Medical History:   Diagnosis Date    Arthritis     Cholelithiasis Gallbladder removed    Claustrophobia     Dental disease     Dizziness     Glaucoma     History of transfusion     Hypertension     Hypothyroidism     MS (multiple sclerosis)     Pancreatitis Pancreatitis attack     PONV (postoperative nausea and vomiting)    ,   Past Surgical History:   Procedure Laterality Date    APPENDECTOMY       SECTION      CHOLECYSTECTOMY      COLONOSCOPY N/A 2020    Procedure: COLONOSCOPY WITH ANESTHESIA;  Surgeon: Eduard Taylor MD;  Location: [facility] ENDOSCOPY;  Service: Gastroenterology;  Laterality: N/A;  pre: screen  post: normal   Jose Ramon Agarwal MD    ELBOW ARTHROSCOPY Left 2019    ENDOSCOPY N/A 2020    Procedure: ESOPHAGOGASTRODUODENOSCOPY WITH ANESTHESIA;  Surgeon: Eduard Taylor MD;  Location: [facility] ENDOSCOPY;  Service: Gastroenterology;  Laterality: N/A;  pre: Pharyngoesophageal dysphagia  post: esophagitis   Jose Ramon Agarwal MD    ENDOSCOPY N/A 2023    Procedure: ESOPHAGOGASTRODUODENOSCOPY WITH  ANESTHESIA;  Surgeon: Eduard Taylor MD;  Location: [facility] ENDOSCOPY;  Service: Gastroenterology;  Laterality: N/A;  pre Pharyngoesophageal dysphagia  post: esophagus dilated  Dr.O Morse    EXTRACORPOREAL SHOCK WAVE LITHOTRIPSY (ESWL) Right 10/19/2022    Procedure: EXTRACORPOREAL SHOCKWAVE LITHOTRIPSY-right;  Surgeon: Leonardo Luna MD;  Location: [facility] OR;  Service: Urology;  Laterality: Right;    HAND RECONSTRUCTION Right     HYSTERECTOMY      JOINT REPLACEMENT Left     OOPHORECTOMY      REPLACEMENT TOTAL KNEE Left     TONSILLECTOMY      TOTAL SHOULDER REPLACEMENT Right 2024    UPPER GASTROINTESTINAL ENDOSCOPY  20   ,   Family History   Problem Relation Age of Onset    Breast cancer Maternal Aunt     Heart failure Mother     Cancer Mother     Heart disease Father     Heart attack Father     Heart failure Father     Rheum arthritis Sister     No Known Problems Brother     No Known Problems Daughter     No Known Problems Son     No Known Problems Maternal Grandmother     No Known Problems Paternal Grandmother     No Known Problems Paternal Aunt     No Known Problems Other     BRCA 1/2 Neg Hx     Colon cancer Neg Hx     Endometrial cancer Neg Hx     Ovarian cancer Neg Hx     Colon polyps Neg Hx    ,   Social History     Tobacco Use    Smoking status: Former     Current packs/day: 0.00     Types: Cigarettes     Start date:      Quit date:      Years since quittin.4     Passive exposure: Never    Smokeless tobacco: Never   Vaping Use    Vaping status: Never Used   Substance Use Topics    Alcohol use: Yes     Comment: occ    Drug use: No   , (Not in a hospital admission)   and Allergies:  Reglan [metoclopramide], Cephalexin, Cephalosporins, Ciprofloxacin, Erythromycin, Macrolides and ketolides, Morphine, Penicillins, Quinolones, Sulfa antibiotics, Thorazine [chlorpromazine], and Zofran [ondansetron hcl]    Current Outpatient Medications:     acetaminophen (TYLENOL) 325 MG tablet, Take 300  mg by mouth As Needed., Disp: , Rfl:     amLODIPine (NORVASC) 2.5 MG tablet, , Disp: , Rfl:     aspirin 81 MG EC tablet, Take 1 tablet by mouth Daily., Disp: , Rfl:     atorvastatin (LIPITOR) 20 MG tablet, Take 1 tablet by mouth Daily., Disp: 90 tablet, Rfl: 3    baclofen (LIORESAL) 10 MG tablet, Take 1 tablet by mouth 3 (Three) Times a Day As Needed., Disp: , Rfl:     brimonidine (ALPHAGAN) 0.2 % ophthalmic solution, Administer 1 drop into the left eye 2 (Two) Times a Day., Disp: , Rfl:     Cholecalciferol 50 MCG (2000 UT) tablet, Daily., Disp: , Rfl:     diazePAM (VALIUM) 2 MG tablet, 1 tablet. (Patient taking differently: Take 1 tablet by mouth Every 6 (Six) Hours As Needed.), Disp: , Rfl:     diclofenac (VOLTAREN) 75 MG EC tablet, Take 1 tablet by mouth Every 12 (Twelve) Hours., Disp: , Rfl:     Dimethyl Fumarate (TECFIDERA) capsule delayed-release, Take 1 capsule by mouth 2 (Two) Times a Day., Disp: , Rfl:     gabapentin (NEURONTIN) 300 MG capsule, Take 1 capsule by mouth 3 (Three) Times a Day., Disp: , Rfl:     gabapentin (NEURONTIN) 600 MG tablet, Take 1 tablet by mouth 3 (Three) Times a Day., Disp: , Rfl:     HYDROcodone-acetaminophen (NORCO) 5-325 MG per tablet, Take 1 tablet by mouth 3 (Three) Times a Day., Disp: , Rfl:     isosorbide mononitrate (IMDUR) 30 MG 24 hr tablet, Take 1 tablet by mouth Daily., Disp: 30 tablet, Rfl: 11    levothyroxine (SYNTHROID, LEVOTHROID) 150 MCG tablet, Take 1 tablet by mouth Daily., Disp: , Rfl:     Melatonin 10 MG tablet, Take  by mouth., Disp: , Rfl:     meloxicam (MOBIC) 15 MG tablet, Take 1 tablet by mouth Daily., Disp: , Rfl:     metoprolol tartrate (LOPRESSOR) 25 MG tablet, Take 12.5 mg (half tablet) at Bedtime the Night Before Coronary CTA Appointment and In the Morning 1 Hour Prior to Coronary CTA Appointment. Do not take if heart rate less than 60., Disp: 1 tablet, Rfl: 0    olmesartan (BENICAR) 40 MG tablet, Take 1 tablet by mouth Daily., Disp: , Rfl:      "omeprazole (priLOSEC) 20 MG capsule, Take 1 capsule by mouth Daily., Disp: , Rfl:     OXcarbazepine (TRILEPTAL) 300 MG tablet, Take 1 tablet by mouth 2 (Two) Times a Day., Disp: , Rfl:     oxybutynin (DITROPAN) 5 MG tablet, Take 1 tablet by mouth 3 (Three) Times a Day As Needed (bladder spasms)., Disp: 30 tablet, Rfl: 1    traZODone (DESYREL) 150 MG tablet, Take 2 tablets by mouth Every Night., Disp: , Rfl:       Review of Systems    Review of Systems - General ROS: negative  ENT ROS: negative  Respiratory ROS: no cough, shortness of breath, or wheezing  Cardiovascular ROS: no chest pain or dyspnea on exertion  Gastrointestinal ROS: no abdominal pain, change in bowel habits, or black or bloody stools  Genito-Urinary ROS: no dysuria, trouble voiding, or hematuria  Dermatological ROS: negative   Breast ROS: negative for breast lumps  Hematological and Lymphatic ROS: negative  Musculoskeletal ROS: right posterior thigh mass    Neurological ROS: no TIA or stroke symptoms    Psychological ROS: negative  Endocrine ROS: negative    Objective     Vital Signs   /64 (BP Location: Right arm, Patient Position: Sitting, Cuff Size: Adult)   Pulse 80   Ht 162.6 cm (64\")   Wt 73.9 kg (163 lb)   SpO2 99%   BMI 27.98 kg/m²      Physical Exam:  General appearance - alert, well appearing, and in no distress  Mental status - alert, oriented to person, place, and time  Eyes - pupils equal and reactive, extraocular eye movements intact  Neurological - alert, oriented, normal speech, no focal findings or movement disorder noted  Musculoskeletal - right posterior upper thigh mass, soft   Physical Exam       Results Review:     The following data was reviewed by: Brittany Lerma MD on 06/04/2025:  US Soft Tissue (06/04/2025 10:52)   In the palpable area there is a linear fluid collection in the deep  subcutaneous soft tissues measuring 3.9 x 0.5 x 5.5 cm may represent a  resolving hematoma. No solid mass or lymphadenopathy is " seen. No  drainable fluid collection is seen.    Assessment & Plan       Diagnoses and all orders for this visit:    1. Leg mass, right (Primary)  -     US Soft Tissue; Future  -     Case Request; Standing  -     XR chest 1 vw; Future  -     ECG 12 Lead; Future  -     heparin (porcine) 5000 UNIT/ML injection 5,000 Units  -     CBC & Differential; Future  -     Comprehensive Metabolic Panel; Future  -     ceFAZolin (ANCEF) 2,000 mg in sodium chloride 0.9 % 100 mL IVPB    2. Overweight with body mass index (BMI) of 27 to 27.9 in adult    Other orders  -     Follow Anesthesia Guidelines / Protocol; Future  -     Follow Anesthesia Guidelines / Protocol; Standing  -     Verify / Perform Chlorhexidine Skin Prep; Standing  -     Provide NPO Instructions to Patient; Future  -     Chlorhexidine Skin Prep; Future  -     Notify physician (specify); Standing  -     Instructions on coughing, deep breathing, and incentive spirometry.; Standing  -     Oxygen Therapy-; Standing  -     Place Sequential Compression Device; Standing  -     Maintain Sequential Compression Device; Standing       Assessment & Plan  1. Right posterior thigh mass.  The differential diagnosis includes a hematoma or a lipoma, given the patient's history of injury and the mass's characteristics. An ultrasound was done today that showed a hematoma. She would like to proceed with surgical excision. The procedure will involve making an incision, removing the mass, and using dissolvable stitches under the skin with glue on top. To prevent fluid accumulation post-surgery, the patient has been advised to wear tight bike shorts or spanks that extend below the area of the mass. The potential risk of fluid accumulation post-surgery, which may necessitate drainage with a needle in the office, has been discussed.     This is a chronic problem. I have reviewed the US above and ordered the above prework. She is at increased risk of perioperative complications 2/2 her  elevated BMI.     BMI is >= 25 and <30. (Overweight) The following options were offered after discussion;: weight loss educational material (shared in after visit summary)      Brittany Lerma MD  06/04/25  15:23 CDT      Patient or patient representative verbalized consent for the use of Ambient Listening during the visit with  Brittany Lerma MD for chart documentation. 6/4/2025  15:18 CDT

## 2025-06-04 NOTE — TELEPHONE ENCOUNTER
Per Dr. Lerma can you please call and let her know that US showed a hematoma. we can excise in OR as discussed.    Called patient and let them know results, patient is being scheduled for surgery on 07/03/25.

## 2025-06-04 NOTE — PROGRESS NOTES
[facility]     [facility]   6/4/25   No

## 2025-06-04 NOTE — PATIENT INSTRUCTIONS

## 2025-06-05 ENCOUNTER — PROCEDURE VISIT (OUTPATIENT)
Dept: UROLOGY | Facility: CLINIC | Age: 65
End: 2025-06-05
Payer: MEDICARE

## 2025-06-05 ENCOUNTER — TELEPHONE (OUTPATIENT)
Dept: SURGERY | Facility: CLINIC | Age: 65
End: 2025-06-05
Payer: MEDICARE

## 2025-06-05 DIAGNOSIS — R31.29 MICROSCOPIC HEMATURIA: Primary | ICD-10-CM

## 2025-06-05 LAB
BILIRUB BLD-MCNC: ABNORMAL MG/DL
CLARITY, POC: CLEAR
COLOR UR: YELLOW
GLUCOSE UR STRIP-MCNC: NEGATIVE MG/DL
KETONES UR QL: ABNORMAL
LEUKOCYTE EST, POC: NEGATIVE
NITRITE UR-MCNC: NEGATIVE MG/ML
PH UR: 5.5 [PH] (ref 5–8)
PROT UR STRIP-MCNC: ABNORMAL MG/DL
RBC # UR STRIP: NEGATIVE /UL
SP GR UR: 1.03 (ref 1–1.03)
UROBILINOGEN UR QL: ABNORMAL

## 2025-06-05 NOTE — PROGRESS NOTES
CC: I am here for the doctor to look at my bladder    Cystoscopy procedure note  Pre- operative diagnosis:  Hematuria    Post operative diagnosis:  Same       CT Abdomen Pelvis With & Without Contrast (05/28/2025 09:26)   INDICATION: microscopic hematuria, hx of kidney stones     COMPARISON: 4/25/2022     DLP: 1882.33 mGy.cm. Automated exposure control was utilized to decrease  patient radiation dose.     FINDINGS:     LOWER CHEST: No acute findings.     LIVER AND BILIARY: No focal liver lesion. The gallbladder is surgically  absent. Stable mild dilatation of the common bile duct, likely reservoir  phenomenon.     PANCREAS: Unremarkable.      SPLEEN: Unremarkable.      ADRENAL: Unremarkable.     KIDNEYS/BLADDER: No solid renal mass. 2 mm right lower pole renal  calculus. 4 mm left upper pole renal calculus. 2 mm and 3 mm left lower  pole renal calculi. No ureteral stone or hydronephrosis. No collecting  system filling defect or evidence of urothelial lesion. No focal urinary  bladder wall thickening.     BOWEL: No colonic wall thickening or pericolonic fat stranding.  Postoperative change of appendectomy. Small hiatal hernia. No small  bowel distention or active inflammation.     PERITONEUM: No ascites.      PELVIC ORGANS: Prior hysterectomy. No pelvic mass or collection.     VASCULATURE: Nonaneurysmal abdominal aorta with atherosclerotic  calcification. Mild atherosclerotic stenosis of the SMA origin. No  evidence of renal artery stenosis.     LYMPH NODES: No enlarged lymph nodes.      SOFT TISSUES: No acute findings.     BONES: Bilateral hip osteoarthritis. There are also areas of linear  sclerosis in both femoral heads concerning for avascular necrosis. No  subchondral collapse. No aggressive osseous lesion. Mild lumbar spine  degenerative change greatest at L4-L5.     IMPRESSION:     1.  Small nonobstructing bilateral renal calculi measuring up to 2 mm on  the right and 4 mm on the left. No ureteral stone or  hydronephrosis.     2.  No solid renal mass or evidence of urothelial lesion.     3.  Small hiatal hernia.        This report was signed and finalized on 5/28/2025 10:09 AM by Dr. William Huang MD.    Procedure:  The patient was prepped and draped in a normal sterile fashion.  The urethra was anesthetized with 2% lidocaine jelly.  A flexible cystoscope was introduced per urethra.   The urethra is normal in appearance without obstruction or mass.  The bladder is without urothelial abnormality.  There is no evidence of any erythema, ulcerations or stones.. There is minimal trabeculation of the detrusor muscle.  The ureteral orifices are orthotopic in position and they efflux clear urine.    Patient tolerated the procedure well    Complications: none    Blood loss: minimal    XR ABDOMEN KUB-     4/17/2025 7:23 AM     HISTORY: kidney stone; N20.0-Calculus of kidney     Images are stored in PACS per institutional protocol.     1 view abdomen/KUB.     COMPARISON:  10/11/2024 and 11/3/2022 KUB.  4/25/2022 abdomen/pelvis CT.     Degenerative spurring at L4-L5.  Pelvic phleboliths.  Cholecystectomy clips.     Bowel gas and fecal material partially obscures the kidneys.     The CT exam from 3 years ago showed bilateral renal stones.  This patient underwent extracorporeal shock wave lithotripsy on  10/19/2022.     No renal or ureteral stone is seen on today's exam.     IMPRESSION:  1. No abnormality is seen.    This report was signed and finalized on 4/17/2025 8:32 AM by Dr. Maximilian Miles MD.        ASSESSMENT AND PLAN          Problem List Items Addressed This Visit    None  Visit Diagnoses         Microscopic hematuria    -  Primary    Relevant Orders    POC Urinalysis Dipstick, Multipro        Evaluation is negative.  I think her discomfort is probably more pelvic muscular related based on her description.  It occurs more with activity.  Microscopic hematuria appears to be benign.  Might be a candidate for pelvic floor  training.    No follow-ups on file.      Leonardo Luna MD  6/5/2025  07:24 CDT

## 2025-06-05 NOTE — TELEPHONE ENCOUNTER
Mary Medley is scheduled for surgery with Dr. Lerma on July 3 with an arrival time of 5:30.  You will check in at the main registration desk.   On the day of surgery you will need a .   We ask that you do not eat or drink anything after midnight on the day of surgery.   Please do not take any anti-inflammatory or weight loss medications, vitamins, ibuprofen, aleve, Advil, motrin, Excedrin, mobic, meloxicam for 7 days prior to surgery.     ______________________________________________________________  Your pre-work appointment will be on June 26 arrive at 10, you will check in at main registration.   For your pre-work appointment you do not have to fast.   For pre-work you do not need a . You will get some lab work and sign consent for surgery.   You may also get an EKG and/or chest xray if needed.     If you have any questions do not hesitate to reach out.   You can reach us at [phone] hit option 2.     Patient confirmed.    CBC  06/05

## 2025-06-05 NOTE — TELEPHONE ENCOUNTER
I left the patient a voicemail requested they give me a call back at their earliest convenience to go over their surgery information.    CBC  06/05

## 2025-06-26 ENCOUNTER — PRE-ADMISSION TESTING (OUTPATIENT)
Dept: PREADMISSION TESTING | Facility: HOSPITAL | Age: 65
End: 2025-06-26
Payer: MEDICARE

## 2025-06-26 ENCOUNTER — HOSPITAL ENCOUNTER (OUTPATIENT)
Dept: GENERAL RADIOLOGY | Facility: HOSPITAL | Age: 65
Discharge: HOME | End: 2025-06-26
Payer: MEDICARE

## 2025-06-26 VITALS
HEART RATE: 57 BPM | SYSTOLIC BLOOD PRESSURE: 117 MMHG | BODY MASS INDEX: 27.7 KG/M2 | DIASTOLIC BLOOD PRESSURE: 51 MMHG | RESPIRATION RATE: 16 BRPM | WEIGHT: 162.26 LBS | HEIGHT: 64 IN | OXYGEN SATURATION: 96 %

## 2025-06-26 DIAGNOSIS — R22.41 LEG MASS, RIGHT: ICD-10-CM

## 2025-06-26 LAB
ALBUMIN SERPL-MCNC: 4.4 G/DL (ref 3.5–5.2)
ALBUMIN/GLOB SERPL: 1.8 G/DL
ALP SERPL-CCNC: 110 U/L (ref 39–117)
ALT SERPL W P-5'-P-CCNC: 17 U/L (ref 1–33)
ANION GAP SERPL CALCULATED.3IONS-SCNC: 13 MMOL/L (ref 5–15)
AST SERPL-CCNC: 27 U/L (ref 1–32)
BASOPHILS # BLD AUTO: 0.04 10*3/MM3 (ref 0–0.2)
BASOPHILS NFR BLD AUTO: 0.6 % (ref 0–1.5)
BILIRUB SERPL-MCNC: 0.3 MG/DL (ref 0–1.2)
BUN SERPL-MCNC: 18.9 MG/DL (ref 8–23)
BUN/CREAT SERPL: 33.2 (ref 7–25)
CALCIUM SPEC-SCNC: 9.6 MG/DL (ref 8.6–10.5)
CHLORIDE SERPL-SCNC: 101 MMOL/L (ref 98–107)
CO2 SERPL-SCNC: 26 MMOL/L (ref 22–29)
CREAT SERPL-MCNC: 0.57 MG/DL (ref 0.57–1)
DEPRECATED RDW RBC AUTO: 45.6 FL (ref 37–54)
EGFRCR SERPLBLD CKD-EPI 2021: 102.3 ML/MIN/1.73
EOSINOPHIL # BLD AUTO: 0.39 10*3/MM3 (ref 0–0.4)
EOSINOPHIL NFR BLD AUTO: 6.1 % (ref 0.3–6.2)
ERYTHROCYTE [DISTWIDTH] IN BLOOD BY AUTOMATED COUNT: 13.2 % (ref 12.3–15.4)
GLOBULIN UR ELPH-MCNC: 2.5 GM/DL
GLUCOSE SERPL-MCNC: 93 MG/DL (ref 65–99)
HCT VFR BLD AUTO: 33.6 % (ref 34–46.6)
HGB BLD-MCNC: 11.2 G/DL (ref 12–15.9)
IMM GRANULOCYTES # BLD AUTO: 0.01 10*3/MM3 (ref 0–0.05)
IMM GRANULOCYTES NFR BLD AUTO: 0.2 % (ref 0–0.5)
LYMPHOCYTES # BLD AUTO: 2.45 10*3/MM3 (ref 0.7–3.1)
LYMPHOCYTES NFR BLD AUTO: 38.4 % (ref 19.6–45.3)
MCH RBC QN AUTO: 31.1 PG (ref 26.6–33)
MCHC RBC AUTO-ENTMCNC: 33.3 G/DL (ref 31.5–35.7)
MCV RBC AUTO: 93.3 FL (ref 79–97)
MONOCYTES # BLD AUTO: 0.65 10*3/MM3 (ref 0.1–0.9)
MONOCYTES NFR BLD AUTO: 10.2 % (ref 5–12)
NEUTROPHILS NFR BLD AUTO: 2.84 10*3/MM3 (ref 1.7–7)
NEUTROPHILS NFR BLD AUTO: 44.5 % (ref 42.7–76)
NRBC BLD AUTO-RTO: 0 /100 WBC (ref 0–0.2)
PLATELET # BLD AUTO: 272 10*3/MM3 (ref 140–450)
PMV BLD AUTO: 9.8 FL (ref 6–12)
POTASSIUM SERPL-SCNC: 4.6 MMOL/L (ref 3.5–5.2)
PROT SERPL-MCNC: 6.9 G/DL (ref 6–8.5)
QT INTERVAL: 426 MS
QTC INTERVAL: 418 MS
RBC # BLD AUTO: 3.6 10*6/MM3 (ref 3.77–5.28)
SODIUM SERPL-SCNC: 140 MMOL/L (ref 136–145)
WBC NRBC COR # BLD AUTO: 6.38 10*3/MM3 (ref 3.4–10.8)

## 2025-06-26 PROCEDURE — 85025 COMPLETE CBC W/AUTO DIFF WBC: CPT

## 2025-06-26 PROCEDURE — 36415 COLL VENOUS BLD VENIPUNCTURE: CPT

## 2025-06-26 PROCEDURE — 71045 X-RAY EXAM CHEST 1 VIEW: CPT

## 2025-06-26 PROCEDURE — 93005 ELECTROCARDIOGRAM TRACING: CPT

## 2025-06-26 PROCEDURE — 80053 COMPREHEN METABOLIC PANEL: CPT

## 2025-06-26 NOTE — DISCHARGE INSTRUCTIONS
Preparing for Surgery  Follow these instructions before the procedure:  Several days or weeks before your procedurNONE  Medication(s) you need to stop   WEEKS OR DAYS prior to surgery: NONE      Ask your health care provider about:  Changing or stopping your regular medicines. This is especially important if you are taking diabetes medicines or blood thinners.  Taking medicines such as aspirin and ibuprofen. These medicines can thin your blood. Do not take these medicines unless your health care provider tells you to take them.  Taking over-the-counter medicines, vitamins, herbs, and supplements.    Contact your surgeon if you:  Develop a fever of more than 100.4°F (38°C) or other feelings of illness during the 48 hours before your surgery.  Have symptoms that get worse.  Have questions or concerns about your surgery.  If you are going home the same day of your surgery you will need to arrange for a responsible adult, age 18 years old or older, to drive you home from the hospital and stay with you for 24 hours. Verification of the  will be made prior to any procedure requiring sedation. You may not go home in a taxi or any form of public transportation by yourself.     Day before your procedure  Medication(s) you need to stop the day before your surgery:OLMESARTAN    24 hours before your procedure DO NOT drink alcoholic beverages or smoke.  You may be asked to shower with a germ-killing soap.  Day of your procedure   You may take the following medication(s) the morning of surgery with a sip of water: DIAZEPAM(VALIUM) IF YOU TAKE REGULAR, GABAPENTIN, HYDROCODONE, AND OMEPRAZOLE      8 hours before your scheduled arrival time, STOP all food, any dairy products, and full liquids. This includes hard candy, chewing gum or mints. This is extremely important to prevent serious complications.     Up to 2 hours before your scheduled arrival time, you may have clear liquids no cream, powder, or pulp of any kind. Safe  options are water, black coffee, plain tea, soda, Gatorade/Powerade, clear broth, apple juice.    2 hours before your scheduled arrival time, STOP drinking clear liquids.    You may need to take another shower with a germ-killing soap before you leave home in the morning. Do not use perfumes, colognes, or body lotions.  Wear comfortable loose-fitting clothing.  Remove all jewelry including body piercing and rings, dark colored nail polish, and make up prior to arrival at the hospital. Leave all valuables at home.   Bring your hearing aids if you rely on them.  Do not wear contact lenses. If you wear eyeglasses remember to bring a case to store them in while you are in surgery.  Do not use denture adhesives since you will be asked to remove them during your surgery.    You do not need to bring your home medications into the hospital.   Bring your sleep apnea device with you on the day of your surgery (if this applies to you).  If you have an Inspire implant for sleep apnea, please bring the remote with you on the day of surgery.  If you wear portable oxygen, bring it with you.   If you are staying overnight, you may bring a bag of items you may need such as slippers, robe and a change of clothes for your discharge. You may want to leave these items in the car until you are ready for them since your family will take your belongings when you leave the pre-operative area.  Arrive at the hospital as scheduled by the office. You will be asked to arrive 2 hours prior to your surgery time in order to prepare for your procedure.  When you arrive at the hospital  Go to the registration desk located at the main entrance of the hospital.  After registration is completed, you will be given a beeper and a sticker sheet. Take the stickers to Outpatient Surgery and place in the tray at the end of the desk to notify the staff that you have arrived and registered.   Return to the lobby to wait. You are not always called back  according to the time of arrival but rather the time your doctor will be ready.  When your beeper lights up and vibrates proceed through the double doors, under the stairs, and a member of the Outpatient Surgery staff will escort you to your preoperative room.

## 2025-06-27 DIAGNOSIS — N32.89 BLADDER SPASMS: ICD-10-CM

## 2025-06-27 RX ORDER — OXYBUTYNIN CHLORIDE 5 MG/1
5 TABLET ORAL 3 TIMES DAILY PRN
Qty: 30 TABLET | Refills: 1 | Status: SHIPPED | OUTPATIENT
Start: 2025-06-27

## 2025-06-27 NOTE — TELEPHONE ENCOUNTER
Rx Refill Note  Requested Prescriptions     Pending Prescriptions Disp Refills    oxybutynin (DITROPAN) 5 MG tablet 30 tablet 1     Sig: Take 1 tablet by mouth 3 (Three) Times a Day As Needed (bladder spasms).      Last office visit with prescribing clinician: 4/17/2025   Last telemedicine visit with prescribing clinician: Visit date not found   Next office visit with prescribing clinician: Visit date not found       Reina Carcamo MA  06/27/25, 10:46 CDT

## 2025-07-02 ENCOUNTER — TELEPHONE (OUTPATIENT)
Dept: SURGERY | Facility: CLINIC | Age: 65
End: 2025-07-02
Payer: MEDICARE

## 2025-07-02 NOTE — TELEPHONE ENCOUNTER
Called Mary to confirm her surgery date 07/03 with an arrival time of 0630.   Reminded her not to eat or drink after midnight.   Let her know to come through the main entrance of the hospital and check in at main registration.     CBC  07/02    Patient confirmed

## 2025-07-03 ENCOUNTER — ANESTHESIA (OUTPATIENT)
Dept: PERIOP | Facility: HOSPITAL | Age: 65
End: 2025-07-03
Payer: MEDICARE

## 2025-07-03 ENCOUNTER — ANESTHESIA EVENT (OUTPATIENT)
Dept: PERIOP | Facility: HOSPITAL | Age: 65
End: 2025-07-03
Payer: MEDICARE

## 2025-07-03 ENCOUNTER — HOSPITAL ENCOUNTER (OUTPATIENT)
Facility: HOSPITAL | Age: 65
Setting detail: HOSPITAL OUTPATIENT SURGERY
Discharge: HOME | End: 2025-07-03
Attending: STUDENT IN AN ORGANIZED HEALTH CARE EDUCATION/TRAINING PROGRAM | Admitting: STUDENT IN AN ORGANIZED HEALTH CARE EDUCATION/TRAINING PROGRAM
Payer: MEDICARE

## 2025-07-03 VITALS
DIASTOLIC BLOOD PRESSURE: 56 MMHG | TEMPERATURE: 97.4 F | HEART RATE: 75 BPM | RESPIRATION RATE: 18 BRPM | SYSTOLIC BLOOD PRESSURE: 129 MMHG | OXYGEN SATURATION: 93 %

## 2025-07-03 DIAGNOSIS — R22.41 LEG MASS, RIGHT: ICD-10-CM

## 2025-07-03 PROCEDURE — 25010000002 DEXAMETHASONE PER 1 MG: Performed by: NURSE ANESTHETIST, CERTIFIED REGISTERED

## 2025-07-03 PROCEDURE — 25010000002 CEFAZOLIN PER 500 MG: Performed by: NURSE ANESTHETIST, CERTIFIED REGISTERED

## 2025-07-03 PROCEDURE — 25010000002 DEXAMETHASONE PER 1 MG: Performed by: ANESTHESIOLOGY

## 2025-07-03 PROCEDURE — 25810000003 LACTATED RINGERS PER 1000 ML: Performed by: STUDENT IN AN ORGANIZED HEALTH CARE EDUCATION/TRAINING PROGRAM

## 2025-07-03 PROCEDURE — 25010000002 FENTANYL CITRATE (PF) 100 MCG/2ML SOLUTION: Performed by: NURSE ANESTHETIST, CERTIFIED REGISTERED

## 2025-07-03 PROCEDURE — 27339 EXC THIGH/KNEE TUM DEP 5CM/>: CPT

## 2025-07-03 PROCEDURE — 25010000002 ONDANSETRON PER 1 MG: Performed by: NURSE ANESTHETIST, CERTIFIED REGISTERED

## 2025-07-03 PROCEDURE — 25010000002 PROPOFOL 10 MG/ML EMULSION: Performed by: NURSE ANESTHETIST, CERTIFIED REGISTERED

## 2025-07-03 PROCEDURE — 27339 EXC THIGH/KNEE TUM DEP 5CM/>: CPT | Performed by: STUDENT IN AN ORGANIZED HEALTH CARE EDUCATION/TRAINING PROGRAM

## 2025-07-03 PROCEDURE — 88304 TISSUE EXAM BY PATHOLOGIST: CPT | Performed by: STUDENT IN AN ORGANIZED HEALTH CARE EDUCATION/TRAINING PROGRAM

## 2025-07-03 PROCEDURE — 25010000002 DIPHENHYDRAMINE PER 50 MG: Performed by: NURSE ANESTHETIST, CERTIFIED REGISTERED

## 2025-07-03 PROCEDURE — 25010000002 LIDOCAINE 1 % SOLUTION 20 ML VIAL: Performed by: STUDENT IN AN ORGANIZED HEALTH CARE EDUCATION/TRAINING PROGRAM

## 2025-07-03 PROCEDURE — 25010000002 BUPIVACAINE (PF) 0.25 % SOLUTION 30 ML VIAL: Performed by: STUDENT IN AN ORGANIZED HEALTH CARE EDUCATION/TRAINING PROGRAM

## 2025-07-03 PROCEDURE — 25010000002 LIDOCAINE PF 2% 2 % SOLUTION: Performed by: NURSE ANESTHETIST, CERTIFIED REGISTERED

## 2025-07-03 PROCEDURE — 25010000002 MIDAZOLAM PER 1MG: Performed by: ANESTHESIOLOGY

## 2025-07-03 PROCEDURE — 25010000002 SUGAMMADEX 200 MG/2ML SOLUTION: Performed by: NURSE ANESTHETIST, CERTIFIED REGISTERED

## 2025-07-03 PROCEDURE — 25010000002 HEPARIN (PORCINE) PER 1000 UNITS: Performed by: STUDENT IN AN ORGANIZED HEALTH CARE EDUCATION/TRAINING PROGRAM

## 2025-07-03 RX ORDER — FENTANYL CITRATE 50 UG/ML
50 INJECTION, SOLUTION INTRAMUSCULAR; INTRAVENOUS
Status: DISCONTINUED | OUTPATIENT
Start: 2025-07-03 | End: 2025-07-03 | Stop reason: HOSPADM

## 2025-07-03 RX ORDER — MIDAZOLAM HYDROCHLORIDE 2 MG/2ML
2 INJECTION, SOLUTION INTRAMUSCULAR; INTRAVENOUS ONCE
Status: COMPLETED | OUTPATIENT
Start: 2025-07-03 | End: 2025-07-03

## 2025-07-03 RX ORDER — ACETAMINOPHEN 325 MG/1
975 TABLET ORAL EVERY 8 HOURS
Start: 2025-07-03 | End: 2026-07-03

## 2025-07-03 RX ORDER — LABETALOL HYDROCHLORIDE 5 MG/ML
5 INJECTION, SOLUTION INTRAVENOUS
Status: DISCONTINUED | OUTPATIENT
Start: 2025-07-03 | End: 2025-07-03 | Stop reason: HOSPADM

## 2025-07-03 RX ORDER — LIDOCAINE HYDROCHLORIDE 20 MG/ML
INJECTION, SOLUTION EPIDURAL; INFILTRATION; INTRACAUDAL; PERINEURAL AS NEEDED
Status: DISCONTINUED | OUTPATIENT
Start: 2025-07-03 | End: 2025-07-03 | Stop reason: SURG

## 2025-07-03 RX ORDER — SODIUM CHLORIDE 0.9 % (FLUSH) 0.9 %
10 SYRINGE (ML) INJECTION AS NEEDED
Status: DISCONTINUED | OUTPATIENT
Start: 2025-07-03 | End: 2025-07-03 | Stop reason: HOSPADM

## 2025-07-03 RX ORDER — MAGNESIUM HYDROXIDE 1200 MG/15ML
LIQUID ORAL AS NEEDED
Status: DISCONTINUED | OUTPATIENT
Start: 2025-07-03 | End: 2025-07-03 | Stop reason: HOSPADM

## 2025-07-03 RX ORDER — IBUPROFEN 600 MG/1
600 TABLET, FILM COATED ORAL EVERY 6 HOURS PRN
Status: DISCONTINUED | OUTPATIENT
Start: 2025-07-03 | End: 2025-07-03 | Stop reason: HOSPADM

## 2025-07-03 RX ORDER — FLUMAZENIL 0.1 MG/ML
0.2 INJECTION INTRAVENOUS AS NEEDED
Status: DISCONTINUED | OUTPATIENT
Start: 2025-07-03 | End: 2025-07-03 | Stop reason: HOSPADM

## 2025-07-03 RX ORDER — FENTANYL CITRATE 50 UG/ML
INJECTION, SOLUTION INTRAMUSCULAR; INTRAVENOUS AS NEEDED
Status: DISCONTINUED | OUTPATIENT
Start: 2025-07-03 | End: 2025-07-03 | Stop reason: SURG

## 2025-07-03 RX ORDER — HYDROCODONE BITARTRATE AND ACETAMINOPHEN 10; 325 MG/1; MG/1
1 TABLET ORAL EVERY 4 HOURS PRN
Status: DISCONTINUED | OUTPATIENT
Start: 2025-07-03 | End: 2025-07-03 | Stop reason: HOSPADM

## 2025-07-03 RX ORDER — PROPOFOL 10 MG/ML
VIAL (ML) INTRAVENOUS AS NEEDED
Status: DISCONTINUED | OUTPATIENT
Start: 2025-07-03 | End: 2025-07-03 | Stop reason: SURG

## 2025-07-03 RX ORDER — FENTANYL CITRATE 50 UG/ML
25 INJECTION, SOLUTION INTRAMUSCULAR; INTRAVENOUS
Status: DISCONTINUED | OUTPATIENT
Start: 2025-07-03 | End: 2025-07-03 | Stop reason: HOSPADM

## 2025-07-03 RX ORDER — PROMETHAZINE HYDROCHLORIDE 25 MG/1
25 TABLET ORAL EVERY 6 HOURS PRN
Qty: 5 TABLET | Refills: 0 | Status: SHIPPED | OUTPATIENT
Start: 2025-07-03 | End: 2025-07-05

## 2025-07-03 RX ORDER — ONDANSETRON 2 MG/ML
4 INJECTION INTRAMUSCULAR; INTRAVENOUS ONCE AS NEEDED
Status: DISCONTINUED | OUTPATIENT
Start: 2025-07-03 | End: 2025-07-03

## 2025-07-03 RX ORDER — NALOXONE HCL 0.4 MG/ML
0.4 VIAL (ML) INJECTION AS NEEDED
Status: DISCONTINUED | OUTPATIENT
Start: 2025-07-03 | End: 2025-07-03 | Stop reason: HOSPADM

## 2025-07-03 RX ORDER — HYDROCODONE BITARTRATE AND ACETAMINOPHEN 5; 325 MG/1; MG/1
1 TABLET ORAL EVERY 4 HOURS PRN
Status: DISCONTINUED | OUTPATIENT
Start: 2025-07-03 | End: 2025-07-03 | Stop reason: HOSPADM

## 2025-07-03 RX ORDER — MIDAZOLAM HYDROCHLORIDE 2 MG/2ML
1 INJECTION, SOLUTION INTRAMUSCULAR; INTRAVENOUS
Status: DISCONTINUED | OUTPATIENT
Start: 2025-07-03 | End: 2025-07-03 | Stop reason: HOSPADM

## 2025-07-03 RX ORDER — SODIUM CHLORIDE 0.9 % (FLUSH) 0.9 %
3 SYRINGE (ML) INJECTION AS NEEDED
Status: DISCONTINUED | OUTPATIENT
Start: 2025-07-03 | End: 2025-07-03 | Stop reason: HOSPADM

## 2025-07-03 RX ORDER — ONDANSETRON 2 MG/ML
INJECTION INTRAMUSCULAR; INTRAVENOUS AS NEEDED
Status: DISCONTINUED | OUTPATIENT
Start: 2025-07-03 | End: 2025-07-03 | Stop reason: SURG

## 2025-07-03 RX ORDER — DEXAMETHASONE SODIUM PHOSPHATE 4 MG/ML
INJECTION, SOLUTION INTRA-ARTICULAR; INTRALESIONAL; INTRAMUSCULAR; INTRAVENOUS; SOFT TISSUE AS NEEDED
Status: DISCONTINUED | OUTPATIENT
Start: 2025-07-03 | End: 2025-07-03 | Stop reason: SURG

## 2025-07-03 RX ORDER — CEFAZOLIN SODIUM 1 G/3ML
INJECTION, POWDER, FOR SOLUTION INTRAMUSCULAR; INTRAVENOUS AS NEEDED
Status: DISCONTINUED | OUTPATIENT
Start: 2025-07-03 | End: 2025-07-03 | Stop reason: SURG

## 2025-07-03 RX ORDER — IBUPROFEN 200 MG
600 TABLET ORAL EVERY 8 HOURS
Start: 2025-07-03 | End: 2026-07-03

## 2025-07-03 RX ORDER — DEXAMETHASONE SODIUM PHOSPHATE 4 MG/ML
4 INJECTION, SOLUTION INTRA-ARTICULAR; INTRALESIONAL; INTRAMUSCULAR; INTRAVENOUS; SOFT TISSUE ONCE AS NEEDED
Status: COMPLETED | OUTPATIENT
Start: 2025-07-03 | End: 2025-07-03

## 2025-07-03 RX ORDER — DEXTROSE MONOHYDRATE 25 G/50ML
12.5 INJECTION, SOLUTION INTRAVENOUS AS NEEDED
Status: DISCONTINUED | OUTPATIENT
Start: 2025-07-03 | End: 2025-07-03 | Stop reason: HOSPADM

## 2025-07-03 RX ORDER — DIPHENHYDRAMINE HYDROCHLORIDE 50 MG/ML
INJECTION, SOLUTION INTRAMUSCULAR; INTRAVENOUS AS NEEDED
Status: DISCONTINUED | OUTPATIENT
Start: 2025-07-03 | End: 2025-07-03 | Stop reason: SURG

## 2025-07-03 RX ORDER — SODIUM CHLORIDE 0.9 % (FLUSH) 0.9 %
10 SYRINGE (ML) INJECTION EVERY 12 HOURS SCHEDULED
Status: DISCONTINUED | OUTPATIENT
Start: 2025-07-03 | End: 2025-07-03 | Stop reason: HOSPADM

## 2025-07-03 RX ORDER — OXYCODONE HYDROCHLORIDE 5 MG/1
5 TABLET ORAL EVERY 8 HOURS PRN
Qty: 10 TABLET | Refills: 0 | Status: SHIPPED | OUTPATIENT
Start: 2025-07-03 | End: 2026-07-03

## 2025-07-03 RX ORDER — HEPARIN SODIUM 5000 [USP'U]/ML
5000 INJECTION, SOLUTION INTRAVENOUS; SUBCUTANEOUS EVERY 8 HOURS SCHEDULED
Status: DISCONTINUED | OUTPATIENT
Start: 2025-07-03 | End: 2025-07-03 | Stop reason: HOSPADM

## 2025-07-03 RX ORDER — SODIUM CHLORIDE, SODIUM LACTATE, POTASSIUM CHLORIDE, CALCIUM CHLORIDE 600; 310; 30; 20 MG/100ML; MG/100ML; MG/100ML; MG/100ML
1000 INJECTION, SOLUTION INTRAVENOUS CONTINUOUS
Status: DISCONTINUED | OUTPATIENT
Start: 2025-07-03 | End: 2025-07-03 | Stop reason: HOSPADM

## 2025-07-03 RX ORDER — LIDOCAINE HYDROCHLORIDE 10 MG/ML
0.5 INJECTION, SOLUTION EPIDURAL; INFILTRATION; INTRACAUDAL; PERINEURAL ONCE AS NEEDED
Status: DISCONTINUED | OUTPATIENT
Start: 2025-07-03 | End: 2025-07-03 | Stop reason: HOSPADM

## 2025-07-03 RX ORDER — ROCURONIUM BROMIDE 10 MG/ML
INJECTION, SOLUTION INTRAVENOUS AS NEEDED
Status: DISCONTINUED | OUTPATIENT
Start: 2025-07-03 | End: 2025-07-03 | Stop reason: SURG

## 2025-07-03 RX ADMIN — DEXAMETHASONE SODIUM PHOSPHATE 4 MG: 4 INJECTION, SOLUTION INTRA-ARTICULAR; INTRALESIONAL; INTRAMUSCULAR; INTRAVENOUS; SOFT TISSUE at 10:15

## 2025-07-03 RX ADMIN — DEXAMETHASONE SODIUM PHOSPHATE 4 MG: 4 INJECTION, SOLUTION INTRA-ARTICULAR; INTRALESIONAL; INTRAMUSCULAR; INTRAVENOUS; SOFT TISSUE at 09:20

## 2025-07-03 RX ADMIN — MIDAZOLAM HYDROCHLORIDE 2 MG: 1 INJECTION, SOLUTION INTRAMUSCULAR; INTRAVENOUS at 09:20

## 2025-07-03 RX ADMIN — ROCURONIUM 50 MG: 50 INJECTION, SOLUTION INTRAVENOUS at 09:29

## 2025-07-03 RX ADMIN — SUGAMMADEX 200 MG: 100 INJECTION, SOLUTION INTRAVENOUS at 10:07

## 2025-07-03 RX ADMIN — FENTANYL CITRATE 100 MCG: 50 INJECTION, SOLUTION INTRAMUSCULAR; INTRAVENOUS at 09:29

## 2025-07-03 RX ADMIN — HYDROCODONE BITARTRATE AND ACETAMINOPHEN 1 TABLET: 5; 325 TABLET ORAL at 10:59

## 2025-07-03 RX ADMIN — ONDANSETRON 4 MG: 2 INJECTION INTRAMUSCULAR; INTRAVENOUS at 10:15

## 2025-07-03 RX ADMIN — DIPHENHYDRAMINE HYDROCHLORIDE 12.5 MG: 50 INJECTION, SOLUTION INTRAMUSCULAR; INTRAVENOUS at 09:38

## 2025-07-03 RX ADMIN — PROPOFOL 150 MG: 10 INJECTION, EMULSION INTRAVENOUS at 09:29

## 2025-07-03 RX ADMIN — HEPARIN SODIUM 5000 UNITS: 5000 INJECTION, SOLUTION INTRAVENOUS; SUBCUTANEOUS at 09:21

## 2025-07-03 RX ADMIN — LIDOCAINE HYDROCHLORIDE 100 MG: 20 INJECTION, SOLUTION EPIDURAL; INFILTRATION; INTRACAUDAL; PERINEURAL at 09:29

## 2025-07-03 RX ADMIN — SODIUM CHLORIDE, POTASSIUM CHLORIDE, SODIUM LACTATE AND CALCIUM CHLORIDE 1000 ML: 600; 310; 30; 20 INJECTION, SOLUTION INTRAVENOUS at 09:20

## 2025-07-03 RX ADMIN — CEFAZOLIN 2 G: 1 INJECTION, POWDER, FOR SOLUTION INTRAMUSCULAR; INTRAVENOUS at 09:38

## 2025-07-03 NOTE — ANESTHESIA PREPROCEDURE EVALUATION
Anesthesia Evaluation     Patient summary reviewed   history of anesthetic complications:  PONV  NPO Solid Status: > 8 hours  NPO Liquid Status: > 8 hours           Airway   Mallampati: I  TM distance: >3 FB  No difficulty expected  Dental    (+) upper dentures    Pulmonary    (+) a smoker Former,  (-) COPD, asthma, sleep apnea  Cardiovascular   Exercise tolerance: good (4-7 METS)    (+) hypertension, hyperlipidemia  (-) pacemaker, past MI, CAD, angina, cardiac stents      Neuro/Psych  (+) psychiatric history  (-) seizures, TIA, CVA  GI/Hepatic/Renal/Endo    (+) GERD, thyroid problem hypothyroidism  (-) liver disease, no renal disease, diabetes    Musculoskeletal         ROS comment: Multiple sclerosis  Abdominal    Substance History      OB/GYN          Other   arthritis,       Other Comment: glaucoma  ROS/Med Hx Other: Severe claustrophobia                Anesthesia Plan    ASA 2     general     intravenous induction     Anesthetic plan, risks, benefits, and alternatives have been provided, discussed and informed consent has been obtained with: patient.    CODE STATUS:

## 2025-07-03 NOTE — ANESTHESIA PROCEDURE NOTES
Airway  Reason: elective    Date/Time: 7/3/2025 9:31 AM  Airway not difficult    General Information and Staff    Patient location during procedure: OR  CRNA/CAA: Shayy Badillo CRNA    Indications and Patient Condition  Indications for airway management: airway protection    Preoxygenated: yes    Mask difficulty assessment: 1 - vent by mask    Final Airway Details    Final airway type: endotracheal airway      Successful airway: ETT  Cuffed: yes   Successful intubation technique: direct laryngoscopy  Endotracheal tube insertion site: oral  Blade: Pepe  Blade size: 2  ETT size (mm): 7.0  Cormack-Lehane Classification: grade I - full view of glottis  Placement verified by: capnometry   Cuff volume (mL): 7  Measured from: lips  ETT/EBT  to lips (cm): 21  Number of attempts at approach: 1  Assessment: lips, teeth, and gum same as pre-op and atraumatic intubation

## 2025-07-03 NOTE — ANESTHESIA POSTPROCEDURE EVALUATION
Patient: Mary Medley    Procedure Summary       Date: 07/03/25 Room / Location:  [facility] OR  /  [facility] OR    Anesthesia Start: 0927 Anesthesia Stop: 1025    Procedure: EXCISION OF RIGHT POSTERIOR THIGH HEMATOMA VS. LIPOMA (PRONE) (Right) Diagnosis:       Leg mass, right      (Leg mass, right [R22.41])    Surgeons: Brittany Lerma MD Provider: Valeriy Paige CRNA    Anesthesia Type: general ASA Status: 2            Anesthesia Type: general    Vitals  Vitals Value Taken Time   /71 07/03/25 10:48   Temp 97.4 °F (36.3 °C) 07/03/25 10:23   Pulse 78 07/03/25 10:48   Resp 16 07/03/25 10:48   SpO2 95 % 07/03/25 10:48           Post Anesthesia Care and Evaluation    Patient location during evaluation: PACU  Patient participation: complete - patient participated  Level of consciousness: awake and alert  Pain management: adequate    Airway patency: patent  Anesthetic complications: No anesthetic complications    Cardiovascular status: acceptable  Respiratory status: acceptable  Hydration status: acceptable    Comments: Blood pressure 152/71, pulse 78, temperature 97.4 °F (36.3 °C), temperature source Temporal, resp. rate 16, SpO2 95%, not currently breastfeeding.    Pt discharged from PACU based on shelia score >8

## 2025-07-03 NOTE — OP NOTE
Excision Right Posterior Thigh Lipoma Operative Report:     Patient: Mary Medley  MRN: [id number]    YOB: 1960  Age: 65 y.o.  Sex: female  Unit:  [facility] OR Room/Bed: [facility] OR/MAIN OR Location: [facility]      Admitting Physician: PATITO BAI    Primary Care Physician: Jose Ramon Madrigal MD             INDICATIONS: This is a 65 y.o. female who presents with a right posterior thigh lipoma for excision.     DATE OF OPERATION: 7/3/2025     Surgeons and Role:     * Patito Bai MD - Primary  Juliana Childers PA-C - assist     ANESTHESIA: General     PREOPERATIVE DIAGNOSIS: Leg mass, right [R22.41]    POSTOPERATIVE DIAGNOSIS: Same    PROCEDURES PERFORMED:    (1) Excision of right posterior thigh lipoma, 9 cm x 11 cm in the deep plane under the fascia     PROCEDURE DETAILS:   Consent was obtained. Preoperative antibiotics and heparin were given. The patient was transferred to the OR and general anesthesia was induced. The patient was positioned in the prone position, ensuring to pad all joint spaces appropriately. The area was prepped with Chloraprep and draped in sterile fashion. A timeout was performed. Local anesthetic was infiltrated and an incision was made over the palpable mass. I dissected down to it. The lipoma was under the fascia. It was excised with cautery and sent for permanent pathology. Hemostasis was obtained. A large cavity was left so a small JASWANT was placed in the cavity. It was sutured in place. The incision was closed with interrupted 3-0 vicryl dermal sutures and a running 4-0 monocryl subcuticular. It was dressed with skin glue. The patient tolerated the procedure well and was transferred to PACU in good condition.    Findings: right posterior thigh lipoma, 9 cm x 11 cm in the deep plane under the fascia   Estimated Blood Loss: 20mL  Complications: none apparent     Juliana Childers PA-C was responsible for performing the following activities: Retraction,  Suturing, Closing, and Placing Dressing and their skilled assistance was necessary for the success of this case.    Specimens: right posterior thigh lipoma      Disposition: PACU - hemodynamically stable.           Condition: stable    Brittany Lerma MD  06/04/2025

## 2025-07-03 NOTE — DISCHARGE INSTRUCTIONS
Wound:   - you have skin glue on your incisions. Okay to shower tomorrow.   - Leave skin glue in place, it should slowly fall off over 2 weeks   - No swimming/soaking/bathing x 2 weeks to allow incisions to heal.     Activity:   - Activity as tolerated.   - No driving or operating machinery on narcotic pain medication.     Pain medication:   - Take 1000mg of tylenol every 8 hours for 3 days. After three days, take it prn.   - Take 600mg of ibuprofen (motrin) every 8 hours for 3 days. After three days, take it prn.   - You have a prescription for a narcotic. It will be roxicodone 5mg tabs. Take these only as needed after you have taken the tylenol and ibuprofen. If you are taking the roxicodone, make sure to take a stool softener (colace) with it as it can cause constipation.   - The narcotic may make you nauseated, you will have a prescription for zofran in case of nausea.     Follow up:   - make an appointment to see Juliana Childers PA-C next Wednesday   - If you have any concerns before then, call me office at [phone]

## 2025-07-07 ENCOUNTER — TELEPHONE (OUTPATIENT)
Dept: SURGERY | Facility: CLINIC | Age: 65
End: 2025-07-07
Payer: MEDICARE

## 2025-07-07 ENCOUNTER — CLINICAL SUPPORT (OUTPATIENT)
Dept: SURGERY | Facility: CLINIC | Age: 65
End: 2025-07-07
Payer: MEDICARE

## 2025-07-07 DIAGNOSIS — R22.41 LEG MASS, RIGHT: Primary | ICD-10-CM

## 2025-07-07 LAB
CYTO UR: NORMAL
LAB AP CASE REPORT: NORMAL
Lab: NORMAL
PATH REPORT.FINAL DX SPEC: NORMAL
PATH REPORT.GROSS SPEC: NORMAL

## 2025-07-07 NOTE — TELEPHONE ENCOUNTER
Called patient to follow up after surgery on Thursday. Patient has a nurse visit today in office to get drain removed.  Patient had no complaints or concerns. Will follow up in office.

## 2025-07-07 NOTE — PROGRESS NOTES
Patient presented today to have her drain removed from right outer thigh. Output was less than 5ml the last 24 hours and less the 10ml the 48 hours previous. Drain dressing removed, stitch removed, and drain pulled without incident. Redressed with guaze and tegaderm.  Reminded patient no soaking underwater for two weeks, wear compression shorts for another three days and then if not swelling can go without them.

## 2025-07-07 NOTE — TELEPHONE ENCOUNTER
Pt called asking if her drain could be removed today. Post OP 7/3/25 Excision of right posterior thigh lipoma. Drain output 16 cc on Sunday and 3 cc today. Spoke with Dr. Lerma. Okay to remove today. Pt to keep follow up appointment with Juliana SEGURA on Monday 7/14/25.

## 2025-07-09 LAB
QT INTERVAL: 426 MS
QTC INTERVAL: 418 MS

## 2025-07-14 ENCOUNTER — OFFICE VISIT (OUTPATIENT)
Dept: SURGERY | Facility: CLINIC | Age: 65
End: 2025-07-14
Payer: MEDICARE

## 2025-07-14 VITALS
WEIGHT: 158 LBS | HEART RATE: 68 BPM | SYSTOLIC BLOOD PRESSURE: 118 MMHG | HEIGHT: 64 IN | DIASTOLIC BLOOD PRESSURE: 70 MMHG | BODY MASS INDEX: 26.98 KG/M2 | OXYGEN SATURATION: 98 %

## 2025-07-14 DIAGNOSIS — R22.41 LEG MASS, RIGHT: Primary | ICD-10-CM

## 2025-07-14 PROCEDURE — 1159F MED LIST DOCD IN RCRD: CPT

## 2025-07-14 PROCEDURE — 3074F SYST BP LT 130 MM HG: CPT

## 2025-07-14 PROCEDURE — 99024 POSTOP FOLLOW-UP VISIT: CPT

## 2025-07-14 PROCEDURE — 1160F RVW MEDS BY RX/DR IN RCRD: CPT

## 2025-07-14 PROCEDURE — 3078F DIAST BP <80 MM HG: CPT

## 2025-07-14 NOTE — PROGRESS NOTES
"Patient: Mary Medley    YOB: 1960    Date: 07/14/2025    Primary Care Provider: Jose Ramon Madrigal MD    Vital Signs:   Vitals:    07/14/25 1143   BP: 118/70   BP Location: Left arm   Patient Position: Sitting   Cuff Size: Adult   Pulse: 68   SpO2: 98%   Weight: 71.7 kg (158 lb)   Height: 162.6 cm (64\")       The patient is tolerating a regular diet and has no complaints s/p excision of lipoma of the right posterior thigh. The patient denies fevers, chills, nausea, vomiting, and excessive pain. The incision is healing well without signs of infection or wound dehiscence. She has a seroma present.     Results Review:   I reviewed the patient's new clinical results.    Tissue Pathology Exam (07/03/2025 09:57)   Right Posterior Thigh Mass, Excision:  - Gross and Histologic Changes of Benign Lipoma.      Upon physical exam, Mary Medley is in need of drainage of the seroma present in the right thigh lipoma excision bed. After discussion of the procedure, as well as risks (including bleeding, infection, and damage to adjacent structures) and benefits, the patient elects to proceed. The right thigh was prepped with iodine. 1 cc of lidocaine was used to anesthetize the area. An 18 gauge and 20cc syringe were inserted in the area and 55mLs of blood tinged seroma fluid were drained from the area. The syringe was removed and a bandage was placed over the area. The patient tolerated the procedure well. I have instructed her that it is important to keep compression over the area for the next week until she is seen in clinic again. She will call for a sooner appointment if she notices significant fluid collecting again.       Assessment / Plan:    Diagnoses and all orders for this visit:    1. Leg mass, right (Primary)        Mary Medley is a 65 y.o. female who presents to the clinic 2 weeks s/p lipoma excision. She is overall doing well at this time. The incision is healing well. At " this time, she will follow up in 2 weeks for recheck and possible seroma drainage. I instructed the patient to call for an appointment if she has any new problems or concerns. She voiced understanding and is agreeable to the plan.     Follow up:     Return in 2 weeks (on 7/28/2025), or if symptoms worsen or fail to improve, for Recheck.        Electronically signed by Juliana Childers PA-C  07/14/25  12:44 CDT

## 2025-07-21 ENCOUNTER — OFFICE VISIT (OUTPATIENT)
Dept: SURGERY | Facility: CLINIC | Age: 65
End: 2025-07-21
Payer: MEDICARE

## 2025-07-21 VITALS
HEART RATE: 75 BPM | OXYGEN SATURATION: 98 % | BODY MASS INDEX: 26.98 KG/M2 | HEIGHT: 64 IN | DIASTOLIC BLOOD PRESSURE: 68 MMHG | WEIGHT: 158 LBS | SYSTOLIC BLOOD PRESSURE: 140 MMHG

## 2025-07-21 DIAGNOSIS — D17.20 LIPOMA OF LOWER LEG: Primary | ICD-10-CM

## 2025-07-21 DIAGNOSIS — L76.33 SEROMA OF SKIN OR SUBCUTANEOUS TISSUE AFTER DERMATOLOGIC PROCEDURE: ICD-10-CM

## 2025-07-21 PROCEDURE — 99024 POSTOP FOLLOW-UP VISIT: CPT

## 2025-07-21 PROCEDURE — 1159F MED LIST DOCD IN RCRD: CPT

## 2025-07-21 PROCEDURE — 3077F SYST BP >= 140 MM HG: CPT

## 2025-07-21 PROCEDURE — 1160F RVW MEDS BY RX/DR IN RCRD: CPT

## 2025-07-21 PROCEDURE — 3078F DIAST BP <80 MM HG: CPT

## 2025-07-21 NOTE — PROGRESS NOTES
"Patient: Mary Medley    YOB: 1960    Date: 07/21/2025    Primary Care Provider: Jose Ramon Madrigal MD    Vital Signs:   Vitals:    07/21/25 1331   BP: 140/68   BP Location: Left arm   Patient Position: Sitting   Cuff Size: Adult   Pulse: 75   SpO2: 98%   Weight: 71.7 kg (158 lb)   Height: 162.6 cm (64\")       The patient is tolerating a regular diet and has no complaints s/p lipoma excision of right thigh by Dr. Lerma on 7/3/25. The patient denies fevers, chills, nausea, vomiting, and excessive pain.  At her last appointment she had a seroma of which I drained.  She is here today complaining of fluid buildup again.    Upon physical exam, Mary Medley is in need of drainage of the seroma present in the right thigh lipoma bed. After discussion of the procedure, as well as risks (including bleeding, infection, and damage to adjacent structures) and benefits, the patient elects to proceed. The right thigh was prepped with iodine. 1 cc of lidocaine was used to anesthetize the area. An 18 gauge and 20cc syringe were inserted in the area and 40 mLs of straw colored fluid were drained from the area.  It did not appear that all the fluid was drained from the area.  It seemed as though patient had multiple small loculation pockets of fluid.  The syringe was removed and a bandage was placed over the area. The patient tolerated the procedure well. I have instructed her that it is important to keep compression over the area for the next week until she is seen in clinic again. She will call for a sooner appointment if she notices significant fluid collecting again.      Assessment / Plan:    Diagnoses and all orders for this visit:    1. Lipoma of lower leg (Primary)    2. Seroma of skin or subcutaneous tissue after dermatologic procedure  -     US Guided Abscess Drain Drainage or Aspiration; Future  -     No Lab Testing Needed; Standing    Ms. Medley is a 65-year-old female who presents " to the clinic 2 weeks status post lipoma excision of the right thigh.  Patient has a persistent seroma in the lipoma bed.  I attempted drainage today in office and while partially successful, the entire cavity was not collapsed.  Therefore I recommended ultrasound-guided aspiration to assist with full collapsing of the cavity.  I will place the order for this and have patient follow-up in office approximately 1 week after the procedure.  She is to call for an appointment if she has any new problems or concerns.  She voices understanding and is agreeable to the plan.    Follow up:     Return in about 2 weeks (around 8/4/2025) for Recheck.        Electronically signed by Juliana Childers PA-C  07/21/25  16:12 CDT                   Weight reduction  Low fat diet.

## 2025-07-25 ENCOUNTER — HOSPITAL ENCOUNTER (OUTPATIENT)
Dept: ULTRASOUND IMAGING | Facility: HOSPITAL | Age: 65
Discharge: HOME | End: 2025-07-25
Payer: MEDICARE

## 2025-07-25 DIAGNOSIS — L76.33 SEROMA OF SKIN OR SUBCUTANEOUS TISSUE AFTER DERMATOLOGIC PROCEDURE: ICD-10-CM

## 2025-07-25 PROCEDURE — 75989 ABSCESS DRAINAGE UNDER X-RAY: CPT

## 2025-07-25 PROCEDURE — 76999 ECHO EXAMINATION PROCEDURE: CPT

## 2025-07-25 RX ORDER — SODIUM CHLORIDE 0.9 % (FLUSH) 0.9 %
10 SYRINGE (ML) INJECTION AS NEEDED
OUTPATIENT
Start: 2025-07-25

## 2025-07-25 RX ORDER — SODIUM CHLORIDE 9 MG/ML
40 INJECTION, SOLUTION INTRAVENOUS AS NEEDED
OUTPATIENT
Start: 2025-07-25

## 2025-07-25 RX ORDER — SODIUM CHLORIDE 0.9 % (FLUSH) 0.9 %
3 SYRINGE (ML) INJECTION EVERY 12 HOURS SCHEDULED
OUTPATIENT
Start: 2025-07-25

## 2025-07-30 ENCOUNTER — OFFICE VISIT (OUTPATIENT)
Dept: SURGERY | Facility: CLINIC | Age: 65
End: 2025-07-30
Payer: MEDICARE

## 2025-07-30 ENCOUNTER — TELEPHONE (OUTPATIENT)
Dept: INTERVENTIONAL RADIOLOGY/VASCULAR | Facility: HOSPITAL | Age: 65
End: 2025-07-30
Payer: MEDICARE

## 2025-07-30 VITALS
WEIGHT: 156 LBS | BODY MASS INDEX: 26.63 KG/M2 | DIASTOLIC BLOOD PRESSURE: 66 MMHG | SYSTOLIC BLOOD PRESSURE: 124 MMHG | HEIGHT: 64 IN

## 2025-07-30 DIAGNOSIS — L76.33 SEROMA OF SKIN OR SUBCUTANEOUS TISSUE AFTER DERMATOLOGIC PROCEDURE: Primary | ICD-10-CM

## 2025-07-30 PROCEDURE — 99024 POSTOP FOLLOW-UP VISIT: CPT

## 2025-07-30 NOTE — TELEPHONE ENCOUNTER
I called and left a message for the patient to touch base after abscess drainage in radiology department.

## 2025-07-30 NOTE — PROGRESS NOTES
"Patient: Mary Medley    YOB: 1960    Date: 07/30/2025    Primary Care Provider: Jose Ramon Madrigal MD    Vital Signs:   Vitals:    07/30/25 1342   BP: 124/66   Weight: 70.8 kg (156 lb)   Height: 162.6 cm (64\")       The patient is tolerating a regular diet and has no complaints s/p excision of a lipoma of the right posterior thigh by Dr. Lerma on 7/3/25. The patient denies fevers, chills, nausea, vomiting, and excessive pain. The incision is healing well without signs of infection or wound dehiscence.     Patient has had continuous seroma in the lipoma excision bed.  I sent the patient for ultrasound-guided drainage after not being able to fully collapse the area in office.  55 mL were drained by the radiologist.  Patient is here today for recheck stating she has more fluid that has collected in the area.    Results Review:   I reviewed the patient's new clinical results.    US Guided Abscess Drain Drainage or Aspiration (07/25/2025 08:56)   IMPRESSION:  Successful and uncomplicated ultrasound guided right thigh  seroma drainage yielding 55 cc blood-tinged serosanguineous fluid.  Collection was aspirated to extinction. No purulence was noted.    Upon physical exam, Mary Medley is in need of drainage of the seroma present in the right posterior thigh previous lipoma bed. After discussion of the procedure, as well as risks (including bleeding, infection, and damage to adjacent structures) and benefits, the patient elects to proceed. The right posterior thigh was prepped with iodine. 1 cc of lidocaine was used to anesthetize the area. An 18 gauge and 20cc syringe were inserted in the area and 30mLs of straw colored fluid were drained from the area. The syringe was removed and a bandage was placed over the area. The patient tolerated the procedure well. I have instructed her that it is important to keep compression over the area for the next week until she is seen in clinic again. " She will call for a sooner appointment if she notices significant fluid collecting again.       Assessment / Plan:    Diagnoses and all orders for this visit:    1. Seroma of skin or subcutaneous tissue after dermatologic procedure (Primary)        Mary Medley is a 65 y.o. female who presents to the clinic 4 weeks s/p lipoma excision. She is overall doing well at this time. The incision is healing well. At this time, she will follow up in office in 1 week for recheck and possible seroma drainage. I instructed the patient to call for an appointment if she has any new problems or concerns. She voiced understanding and is agreeable to the plan.     Follow up:     Return for Next scheduled follow up.        Electronically signed by Juliana Childers PA-C  07/30/25  15:50 CDT

## 2025-08-06 ENCOUNTER — OFFICE VISIT (OUTPATIENT)
Dept: SURGERY | Facility: CLINIC | Age: 65
End: 2025-08-06
Payer: MEDICARE

## 2025-08-06 VITALS
BODY MASS INDEX: 26.63 KG/M2 | HEIGHT: 64 IN | SYSTOLIC BLOOD PRESSURE: 132 MMHG | WEIGHT: 156 LBS | DIASTOLIC BLOOD PRESSURE: 72 MMHG

## 2025-08-06 DIAGNOSIS — L76.33 SEROMA OF SKIN OR SUBCUTANEOUS TISSUE AFTER DERMATOLOGIC PROCEDURE: Primary | ICD-10-CM

## 2025-08-06 PROCEDURE — 99024 POSTOP FOLLOW-UP VISIT: CPT

## 2025-08-12 ENCOUNTER — OFFICE VISIT (OUTPATIENT)
Dept: CARDIOLOGY | Facility: CLINIC | Age: 65
End: 2025-08-12
Payer: MEDICARE

## 2025-08-12 VITALS
OXYGEN SATURATION: 96 % | HEIGHT: 64 IN | WEIGHT: 157 LBS | SYSTOLIC BLOOD PRESSURE: 114 MMHG | HEART RATE: 61 BPM | BODY MASS INDEX: 26.8 KG/M2 | DIASTOLIC BLOOD PRESSURE: 60 MMHG

## 2025-08-12 DIAGNOSIS — I10 PRIMARY HYPERTENSION: Chronic | ICD-10-CM

## 2025-08-12 DIAGNOSIS — E78.5 HYPERLIPIDEMIA, UNSPECIFIED HYPERLIPIDEMIA TYPE: Chronic | ICD-10-CM

## 2025-08-12 DIAGNOSIS — R07.9 CHEST PAIN, UNSPECIFIED TYPE: Primary | Chronic | ICD-10-CM

## 2025-08-12 PROCEDURE — 3074F SYST BP LT 130 MM HG: CPT

## 2025-08-12 PROCEDURE — 93000 ELECTROCARDIOGRAM COMPLETE: CPT

## 2025-08-12 PROCEDURE — 3078F DIAST BP <80 MM HG: CPT

## 2025-08-12 PROCEDURE — 99214 OFFICE O/P EST MOD 30 MIN: CPT

## 2025-08-12 RX ORDER — ISOSORBIDE MONONITRATE 30 MG/1
30 TABLET, EXTENDED RELEASE ORAL DAILY
Qty: 30 TABLET | Refills: 11 | Status: SHIPPED | OUTPATIENT
Start: 2025-08-12

## 2025-08-20 ENCOUNTER — OFFICE VISIT (OUTPATIENT)
Dept: SURGERY | Facility: CLINIC | Age: 65
End: 2025-08-20
Payer: MEDICARE

## 2025-08-20 VITALS
HEIGHT: 64 IN | SYSTOLIC BLOOD PRESSURE: 142 MMHG | WEIGHT: 157 LBS | DIASTOLIC BLOOD PRESSURE: 72 MMHG | BODY MASS INDEX: 26.8 KG/M2

## 2025-08-20 DIAGNOSIS — L76.33 SEROMA OF SKIN OR SUBCUTANEOUS TISSUE AFTER DERMATOLOGIC PROCEDURE: Primary | ICD-10-CM

## 2025-08-20 PROCEDURE — 99024 POSTOP FOLLOW-UP VISIT: CPT

## 2025-08-21 ENCOUNTER — TELEPHONE (OUTPATIENT)
Dept: CARDIOLOGY | Facility: CLINIC | Age: 65
End: 2025-08-21
Payer: MEDICARE

## 2025-08-22 DIAGNOSIS — I10 PRIMARY HYPERTENSION: Primary | ICD-10-CM

## 2025-08-22 RX ORDER — LISINOPRIL 2.5 MG/1
2.5 TABLET ORAL DAILY
Qty: 30 TABLET | Refills: 11 | Status: SHIPPED | OUTPATIENT
Start: 2025-08-22

## 2025-08-27 ENCOUNTER — TELEPHONE (OUTPATIENT)
Dept: SURGERY | Facility: CLINIC | Age: 65
End: 2025-08-27
Payer: MEDICARE

## 2025-08-27 DIAGNOSIS — L76.33 SEROMA OF SKIN OR SUBCUTANEOUS TISSUE AFTER DERMATOLOGIC PROCEDURE: Primary | ICD-10-CM

## 2025-08-28 ENCOUNTER — TELEPHONE (OUTPATIENT)
Dept: CARDIOLOGY | Facility: CLINIC | Age: 65
End: 2025-08-28
Payer: MEDICARE

## (undated) DEVICE — YANKAUER,BULB TIP WITH VENT: Brand: ARGYLE

## (undated) DEVICE — MASK,OXYGEN,MED CONC,ADLT,7' TUB, UC: Brand: PENDING

## (undated) DEVICE — Device: Brand: DEFENDO AIR/WATER/SUCTION AND BIOPSY VALVE

## (undated) DEVICE — PAD MINOR UNIVERSAL: Brand: MEDLINE INDUSTRIES, INC.

## (undated) DEVICE — TRAP FLD MINIVAC MEGADYNE 100ML

## (undated) DEVICE — GLV SURG SENSICARE W/ALOE PF LF 6.5 STRL

## (undated) DEVICE — GLOVE,SURG,SENSICARE,ALOE,LF,PF,6: Brand: MEDLINE

## (undated) DEVICE — ANTIBACTERIAL UNDYED BRAIDED (POLYGLACTIN 910), SYNTHETIC ABSORBABLE SUTURE: Brand: COATED VICRYL

## (undated) DEVICE — CONMED SCOPE SAVER BITE BLOCK, 20X27 MM: Brand: SCOPE SAVER

## (undated) DEVICE — 3M™ IOBAN™ 2 ANTIMICROBIAL INCISE DRAPE 6651EZ: Brand: IOBAN™ 2

## (undated) DEVICE — SUT MNCRYL 4/0 PS2 27IN UD MCP426H

## (undated) DEVICE — Device

## (undated) DEVICE — CUFF,BP,DISP,1 TUBE,ADULT,HP: Brand: MEDLINE

## (undated) DEVICE — RESERVOIR,SUCTION,100CC,SILICONE: Brand: MEDLINE

## (undated) DEVICE — PATIENT RETURN ELECTRODE, SINGLE-USE, CONTACT QUALITY MONITORING, ADULT, WITH 9FT CORD, FOR PATIENTS WEIGING OVER 33LBS. (15KG): Brand: MEGADYNE

## (undated) DEVICE — SYR CONTRL LUERLOK 10CC

## (undated) DEVICE — TBG SMPL FLTR LINE NASL 02/C02 A/ BX/100

## (undated) DEVICE — ELECTRD BLD EZ CLN MOD XLNG 2.75IN

## (undated) DEVICE — THE CHANNEL CLEANING BRUSH IS A NYLON FLEXI BRUSH ATTACHED TO A FLEXIBLE PLASTIC SHEATH DESIGNED TO SAFELY REMOVE DEBRIS FROM FLEXIBLE ENDOSCOPES.

## (undated) DEVICE — SENSR O2 OXIMAX FNGR A/ 18IN NONSTR

## (undated) DEVICE — GOWN,NON-REINFORCED,SIRUS,SET IN SLV,XXL: Brand: MEDLINE

## (undated) DEVICE — FRCP BX RADJAW4 NDL 2.8 240 STD OG

## (undated) DEVICE — MICRO HVTSA, 0.5G AND HVTSA SOURCEMARK PRODUCT CODE M1206 AND M1206-01: Brand: EXOFIN MICRO HVTSA, 0.5G

## (undated) DEVICE — 4-PORT MANIFOLD: Brand: NEPTUNE 2